# Patient Record
Sex: MALE | Race: WHITE | NOT HISPANIC OR LATINO | Employment: OTHER | URBAN - METROPOLITAN AREA
[De-identification: names, ages, dates, MRNs, and addresses within clinical notes are randomized per-mention and may not be internally consistent; named-entity substitution may affect disease eponyms.]

---

## 2017-01-04 ENCOUNTER — APPOINTMENT (OUTPATIENT)
Dept: LAB | Facility: CLINIC | Age: 67
End: 2017-01-04
Payer: COMMERCIAL

## 2017-01-04 ENCOUNTER — TRANSCRIBE ORDERS (OUTPATIENT)
Dept: LAB | Facility: CLINIC | Age: 67
End: 2017-01-04

## 2017-01-04 DIAGNOSIS — E55.9 VITAMIN D DEFICIENCY: ICD-10-CM

## 2017-01-04 DIAGNOSIS — I10 ESSENTIAL (PRIMARY) HYPERTENSION: ICD-10-CM

## 2017-01-04 DIAGNOSIS — M75.120 COMPLETE TEAR OF ROTATOR CUFF, UNSPECIFIED LATERALITY: Primary | ICD-10-CM

## 2017-01-04 LAB
25(OH)D3 SERPL-MCNC: 10.2 NG/ML (ref 30–100)
ALBUMIN SERPL BCP-MCNC: 3.9 G/DL (ref 3.5–5)
ALP SERPL-CCNC: 66 U/L (ref 46–116)
ALT SERPL W P-5'-P-CCNC: 35 U/L (ref 12–78)
ANION GAP SERPL CALCULATED.3IONS-SCNC: 5 MMOL/L (ref 4–13)
AST SERPL W P-5'-P-CCNC: 17 U/L (ref 5–45)
BASOPHILS # BLD AUTO: 0.06 THOUSANDS/ΜL (ref 0–0.1)
BASOPHILS NFR BLD AUTO: 1 % (ref 0–1)
BILIRUB SERPL-MCNC: 1.24 MG/DL (ref 0.2–1)
BUN SERPL-MCNC: 16 MG/DL (ref 5–25)
CALCIUM SERPL-MCNC: 9 MG/DL (ref 8.3–10.1)
CHLORIDE SERPL-SCNC: 102 MMOL/L (ref 100–108)
CHOLEST SERPL-MCNC: 171 MG/DL (ref 50–200)
CO2 SERPL-SCNC: 30 MMOL/L (ref 21–32)
CREAT SERPL-MCNC: 1.15 MG/DL (ref 0.6–1.3)
EOSINOPHIL # BLD AUTO: 0.25 THOUSAND/ΜL (ref 0–0.61)
EOSINOPHIL NFR BLD AUTO: 3 % (ref 0–6)
ERYTHROCYTE [DISTWIDTH] IN BLOOD BY AUTOMATED COUNT: 14.5 % (ref 11.6–15.1)
GFR SERPL CREATININE-BSD FRML MDRD: >60 ML/MIN/1.73SQ M
GLUCOSE SERPL-MCNC: 102 MG/DL (ref 65–140)
HCT VFR BLD AUTO: 45.9 % (ref 36.5–49.3)
HDLC SERPL-MCNC: 35 MG/DL (ref 40–60)
HGB BLD-MCNC: 15.8 G/DL (ref 12–17)
LDLC SERPL CALC-MCNC: 84 MG/DL (ref 0–100)
LYMPHOCYTES # BLD AUTO: 2.01 THOUSANDS/ΜL (ref 0.6–4.47)
LYMPHOCYTES NFR BLD AUTO: 26 % (ref 14–44)
MCH RBC QN AUTO: 30.2 PG (ref 26.8–34.3)
MCHC RBC AUTO-ENTMCNC: 34.4 G/DL (ref 31.4–37.4)
MCV RBC AUTO: 88 FL (ref 82–98)
MONOCYTES # BLD AUTO: 0.71 THOUSAND/ΜL (ref 0.17–1.22)
MONOCYTES NFR BLD AUTO: 9 % (ref 4–12)
NEUTROPHILS # BLD AUTO: 4.69 THOUSANDS/ΜL (ref 1.85–7.62)
NEUTS SEG NFR BLD AUTO: 61 % (ref 43–75)
NRBC BLD AUTO-RTO: 0 /100 WBCS
PLATELET # BLD AUTO: 239 THOUSANDS/UL (ref 149–390)
PMV BLD AUTO: 11.2 FL (ref 8.9–12.7)
POTASSIUM SERPL-SCNC: 4.2 MMOL/L (ref 3.5–5.3)
PROT SERPL-MCNC: 6.9 G/DL (ref 6.4–8.2)
RBC # BLD AUTO: 5.24 MILLION/UL (ref 3.88–5.62)
SODIUM SERPL-SCNC: 137 MMOL/L (ref 136–145)
TRIGL SERPL-MCNC: 262 MG/DL
WBC # BLD AUTO: 7.73 THOUSAND/UL (ref 4.31–10.16)

## 2017-01-04 PROCEDURE — 36415 COLL VENOUS BLD VENIPUNCTURE: CPT

## 2017-01-04 PROCEDURE — 85025 COMPLETE CBC W/AUTO DIFF WBC: CPT

## 2017-01-04 PROCEDURE — 80061 LIPID PANEL: CPT

## 2017-01-04 PROCEDURE — 82306 VITAMIN D 25 HYDROXY: CPT

## 2017-01-04 PROCEDURE — 80053 COMPREHEN METABOLIC PANEL: CPT

## 2017-01-12 ENCOUNTER — ALLSCRIPTS OFFICE VISIT (OUTPATIENT)
Dept: OTHER | Facility: OTHER | Age: 67
End: 2017-01-12

## 2017-01-17 ENCOUNTER — GENERIC CONVERSION - ENCOUNTER (OUTPATIENT)
Dept: OTHER | Facility: OTHER | Age: 67
End: 2017-01-17

## 2017-01-17 RX ORDER — ATENOLOL 50 MG/1
TABLET ORAL
Status: ON HOLD | COMMUNITY
Start: 2016-01-22 | End: 2017-01-25 | Stop reason: SDUPTHER

## 2017-01-17 RX ORDER — FLUTICASONE PROPIONATE 50 MCG
SPRAY, SUSPENSION (ML) NASAL
COMMUNITY
Start: 2015-08-06 | End: 2017-07-16

## 2017-01-17 RX ORDER — ACETAMINOPHEN 160 MG
TABLET,DISINTEGRATING ORAL
COMMUNITY
Start: 2016-06-06

## 2017-01-18 RX ORDER — LOSARTAN POTASSIUM 25 MG/1
25 TABLET ORAL DAILY
COMMUNITY
End: 2019-06-03 | Stop reason: CLARIF

## 2017-01-19 ENCOUNTER — ALLSCRIPTS OFFICE VISIT (OUTPATIENT)
Dept: OTHER | Facility: OTHER | Age: 67
End: 2017-01-19

## 2017-01-25 ENCOUNTER — ANESTHESIA EVENT (OUTPATIENT)
Dept: PERIOP | Facility: HOSPITAL | Age: 67
End: 2017-01-25
Payer: COMMERCIAL

## 2017-01-25 ENCOUNTER — HOSPITAL ENCOUNTER (OUTPATIENT)
Facility: HOSPITAL | Age: 67
Setting detail: OUTPATIENT SURGERY
Discharge: HOME | End: 2017-01-25
Attending: ORTHOPAEDIC SURGERY | Admitting: ORTHOPAEDIC SURGERY
Payer: COMMERCIAL

## 2017-01-25 ENCOUNTER — ANESTHESIA (OUTPATIENT)
Dept: PERIOP | Facility: HOSPITAL | Age: 67
End: 2017-01-25
Payer: COMMERCIAL

## 2017-01-25 VITALS
HEIGHT: 72 IN | OXYGEN SATURATION: 95 % | WEIGHT: 216.71 LBS | SYSTOLIC BLOOD PRESSURE: 153 MMHG | DIASTOLIC BLOOD PRESSURE: 70 MMHG | TEMPERATURE: 97.5 F | RESPIRATION RATE: 14 BRPM | HEART RATE: 70 BPM | BODY MASS INDEX: 29.35 KG/M2

## 2017-01-25 PROBLEM — M75.122 COMPLETE TEAR OF LEFT ROTATOR CUFF: Status: ACTIVE | Noted: 2017-01-25

## 2017-01-25 PROBLEM — M75.20 BICEPS TENDINITIS: Status: ACTIVE | Noted: 2017-01-25

## 2017-01-25 PROCEDURE — C1713 ANCHOR/SCREW BN/BN,TIS/BN: HCPCS | Performed by: ORTHOPAEDIC SURGERY

## 2017-01-25 PROCEDURE — A9270 NON-COVERED ITEM OR SERVICE: HCPCS

## 2017-01-25 DEVICE — ANCHOR SUT 4.75 X 19.1MM CLD EYELET SWIVLC VENTED: Type: IMPLANTABLE DEVICE | Site: SHOULDER | Status: FUNCTIONAL

## 2017-01-25 DEVICE — ANCHOR SUT 4.5 X 14MM FIBERWIRE 2  CRKSCR FLLY THRD: Type: IMPLANTABLE DEVICE | Site: SHOULDER | Status: FUNCTIONAL

## 2017-01-25 RX ORDER — SCOLOPAMINE TRANSDERMAL SYSTEM 1 MG/1
PATCH, EXTENDED RELEASE TRANSDERMAL
Status: COMPLETED
Start: 2017-01-25 | End: 2017-01-25

## 2017-01-25 RX ORDER — FENTANYL CITRATE/PF 50 MCG/ML
25 SYRINGE (ML) INJECTION
Status: DISCONTINUED | OUTPATIENT
Start: 2017-01-25 | End: 2017-01-25 | Stop reason: HOSPADM

## 2017-01-25 RX ORDER — PROPOFOL 10 MG/ML
INJECTION, EMULSION INTRAVENOUS AS NEEDED
Status: DISCONTINUED | OUTPATIENT
Start: 2017-01-25 | End: 2017-01-25 | Stop reason: SURG

## 2017-01-25 RX ORDER — OXYCODONE HYDROCHLORIDE 5 MG/1
5 TABLET ORAL EVERY 4 HOURS PRN
Status: DISCONTINUED | OUTPATIENT
Start: 2017-01-25 | End: 2017-01-25 | Stop reason: HOSPADM

## 2017-01-25 RX ORDER — SODIUM CHLORIDE, SODIUM LACTATE, POTASSIUM CHLORIDE, CALCIUM CHLORIDE 600; 310; 30; 20 MG/100ML; MG/100ML; MG/100ML; MG/100ML
100 INJECTION, SOLUTION INTRAVENOUS CONTINUOUS
Status: DISCONTINUED | OUTPATIENT
Start: 2017-01-25 | End: 2017-01-25 | Stop reason: HOSPADM

## 2017-01-25 RX ORDER — MIDAZOLAM HYDROCHLORIDE 1 MG/ML
INJECTION INTRAMUSCULAR; INTRAVENOUS
Status: COMPLETED
Start: 2017-01-25 | End: 2017-01-25

## 2017-01-25 RX ORDER — OXYCODONE HYDROCHLORIDE 5 MG/1
TABLET ORAL
Qty: 90 TABLET | Refills: 0 | Status: SHIPPED | OUTPATIENT
Start: 2017-01-25 | End: 2017-07-16

## 2017-01-25 RX ORDER — ALBUMIN, HUMAN INJ 5% 5 %
SOLUTION INTRAVENOUS CONTINUOUS PRN
Status: DISCONTINUED | OUTPATIENT
Start: 2017-01-25 | End: 2017-01-25 | Stop reason: SURG

## 2017-01-25 RX ORDER — FENTANYL CITRATE/PF 50 MCG/ML
SYRINGE (ML) INJECTION
Status: COMPLETED
Start: 2017-01-25 | End: 2017-01-25

## 2017-01-25 RX ORDER — LIDOCAINE HYDROCHLORIDE 10 MG/ML
INJECTION, SOLUTION INFILTRATION; PERINEURAL AS NEEDED
Status: DISCONTINUED | OUTPATIENT
Start: 2017-01-25 | End: 2017-01-25 | Stop reason: SURG

## 2017-01-25 RX ORDER — SODIUM CHLORIDE, SODIUM LACTATE, POTASSIUM CHLORIDE, CALCIUM CHLORIDE 600; 310; 30; 20 MG/100ML; MG/100ML; MG/100ML; MG/100ML
INJECTION, SOLUTION INTRAVENOUS CONTINUOUS PRN
Status: DISCONTINUED | OUTPATIENT
Start: 2017-01-25 | End: 2017-01-25 | Stop reason: SURG

## 2017-01-25 RX ORDER — ONDANSETRON 2 MG/ML
INJECTION INTRAMUSCULAR; INTRAVENOUS AS NEEDED
Status: DISCONTINUED | OUTPATIENT
Start: 2017-01-25 | End: 2017-01-25 | Stop reason: SURG

## 2017-01-25 RX ORDER — PROMETHAZINE HYDROCHLORIDE 25 MG/ML
12.5 INJECTION, SOLUTION INTRAMUSCULAR; INTRAVENOUS ONCE AS NEEDED
Status: COMPLETED | OUTPATIENT
Start: 2017-01-25 | End: 2017-01-25

## 2017-01-25 RX ORDER — LABETALOL HYDROCHLORIDE 5 MG/ML
10 INJECTION, SOLUTION INTRAVENOUS ONCE
Status: COMPLETED | OUTPATIENT
Start: 2017-01-25 | End: 2017-01-25

## 2017-01-25 RX ORDER — OXYCODONE HYDROCHLORIDE 5 MG/1
10 TABLET ORAL EVERY 4 HOURS PRN
Status: DISCONTINUED | OUTPATIENT
Start: 2017-01-25 | End: 2017-01-25 | Stop reason: HOSPADM

## 2017-01-25 RX ORDER — ONDANSETRON 2 MG/ML
4 INJECTION INTRAMUSCULAR; INTRAVENOUS ONCE AS NEEDED
Status: COMPLETED | OUTPATIENT
Start: 2017-01-25 | End: 2017-01-25

## 2017-01-25 RX ORDER — DOCUSATE SODIUM 100 MG/1
100 CAPSULE, LIQUID FILLED ORAL 2 TIMES DAILY
Qty: 60 CAPSULE | Refills: 0 | Status: SHIPPED | OUTPATIENT
Start: 2017-01-25 | End: 2020-07-28 | Stop reason: ALTCHOICE

## 2017-01-25 RX ORDER — SUCCINYLCHOLINE CHLORIDE 20 MG/ML
INJECTION INTRAMUSCULAR; INTRAVENOUS AS NEEDED
Status: DISCONTINUED | OUTPATIENT
Start: 2017-01-25 | End: 2017-01-25 | Stop reason: SURG

## 2017-01-25 RX ORDER — KETOROLAC TROMETHAMINE 30 MG/ML
INJECTION, SOLUTION INTRAMUSCULAR; INTRAVENOUS AS NEEDED
Status: DISCONTINUED | OUTPATIENT
Start: 2017-01-25 | End: 2017-01-25 | Stop reason: SURG

## 2017-01-25 RX ADMIN — LABETALOL HYDROCHLORIDE 10 MG: 5 INJECTION, SOLUTION INTRAVENOUS at 11:50

## 2017-01-25 RX ADMIN — CEFAZOLIN SODIUM 2000 MG: 2 SOLUTION INTRAVENOUS at 08:20

## 2017-01-25 RX ADMIN — KETOROLAC TROMETHAMINE 30 MG: 30 INJECTION, SOLUTION INTRAMUSCULAR at 10:15

## 2017-01-25 RX ADMIN — PHENYLEPHRINE HYDROCHLORIDE 30 MCG/MIN: 10 INJECTION, SOLUTION INTRAMUSCULAR; INTRAVENOUS; SUBCUTANEOUS at 08:42

## 2017-01-25 RX ADMIN — DEXAMETHASONE SODIUM PHOSPHATE 4 MG: 10 INJECTION INTRAMUSCULAR; INTRAVENOUS at 08:16

## 2017-01-25 RX ADMIN — LIDOCAINE HYDROCHLORIDE 50 MG: 10 INJECTION, SOLUTION INFILTRATION; PERINEURAL at 08:10

## 2017-01-25 RX ADMIN — ONDANSETRON 4 MG: 2 INJECTION INTRAMUSCULAR; INTRAVENOUS at 11:29

## 2017-01-25 RX ADMIN — MIDAZOLAM HYDROCHLORIDE 2 MG: 1 INJECTION, SOLUTION INTRAMUSCULAR; INTRAVENOUS at 07:36

## 2017-01-25 RX ADMIN — PROMETHAZINE HYDROCHLORIDE 12.5 MG: 25 INJECTION INTRAMUSCULAR; INTRAVENOUS at 11:45

## 2017-01-25 RX ADMIN — FENTANYL CITRATE 100 MCG: 50 INJECTION INTRAMUSCULAR; INTRAVENOUS at 07:36

## 2017-01-25 RX ADMIN — SCOPALAMINE 1 PATCH: 1 PATCH, EXTENDED RELEASE TRANSDERMAL at 07:34

## 2017-01-25 RX ADMIN — SUCCINYLCHOLINE CHLORIDE 200 MG: 20 INJECTION, SOLUTION INTRAMUSCULAR; INTRAVENOUS at 08:10

## 2017-01-25 RX ADMIN — PROPOFOL 180 MG: 10 INJECTION, EMULSION INTRAVENOUS at 08:10

## 2017-01-25 RX ADMIN — ALBUMIN HUMAN: 0.05 INJECTION, SOLUTION INTRAVENOUS at 08:32

## 2017-01-25 RX ADMIN — SODIUM CHLORIDE, SODIUM LACTATE, POTASSIUM CHLORIDE, AND CALCIUM CHLORIDE: .6; .31; .03; .02 INJECTION, SOLUTION INTRAVENOUS at 07:20

## 2017-01-25 RX ADMIN — ONDANSETRON 4 MG: 2 INJECTION INTRAMUSCULAR; INTRAVENOUS at 10:16

## 2017-02-01 ENCOUNTER — GENERIC CONVERSION - ENCOUNTER (OUTPATIENT)
Dept: OTHER | Facility: OTHER | Age: 67
End: 2017-02-01

## 2017-02-09 ENCOUNTER — ALLSCRIPTS OFFICE VISIT (OUTPATIENT)
Dept: OTHER | Facility: OTHER | Age: 67
End: 2017-02-09

## 2017-03-08 ENCOUNTER — GENERIC CONVERSION - ENCOUNTER (OUTPATIENT)
Dept: OTHER | Facility: OTHER | Age: 67
End: 2017-03-08

## 2017-03-08 ENCOUNTER — APPOINTMENT (OUTPATIENT)
Dept: PHYSICAL THERAPY | Facility: CLINIC | Age: 67
End: 2017-03-08
Payer: COMMERCIAL

## 2017-03-08 PROCEDURE — 97162 PT EVAL MOD COMPLEX 30 MIN: CPT

## 2017-03-09 ENCOUNTER — ALLSCRIPTS OFFICE VISIT (OUTPATIENT)
Dept: OTHER | Facility: OTHER | Age: 67
End: 2017-03-09

## 2017-03-10 ENCOUNTER — APPOINTMENT (OUTPATIENT)
Dept: PHYSICAL THERAPY | Facility: CLINIC | Age: 67
End: 2017-03-10
Payer: COMMERCIAL

## 2017-03-15 ENCOUNTER — APPOINTMENT (OUTPATIENT)
Dept: PHYSICAL THERAPY | Facility: CLINIC | Age: 67
End: 2017-03-15
Payer: COMMERCIAL

## 2017-03-15 PROCEDURE — 97140 MANUAL THERAPY 1/> REGIONS: CPT

## 2017-03-15 PROCEDURE — 97110 THERAPEUTIC EXERCISES: CPT

## 2017-03-15 PROCEDURE — 97010 HOT OR COLD PACKS THERAPY: CPT

## 2017-03-17 ENCOUNTER — APPOINTMENT (OUTPATIENT)
Dept: PHYSICAL THERAPY | Facility: CLINIC | Age: 67
End: 2017-03-17
Payer: COMMERCIAL

## 2017-03-17 PROCEDURE — 97110 THERAPEUTIC EXERCISES: CPT

## 2017-03-17 PROCEDURE — 97140 MANUAL THERAPY 1/> REGIONS: CPT

## 2017-03-21 ENCOUNTER — APPOINTMENT (OUTPATIENT)
Dept: PHYSICAL THERAPY | Facility: CLINIC | Age: 67
End: 2017-03-21
Payer: COMMERCIAL

## 2017-03-21 PROCEDURE — 97140 MANUAL THERAPY 1/> REGIONS: CPT

## 2017-03-21 PROCEDURE — 97110 THERAPEUTIC EXERCISES: CPT

## 2017-03-24 ENCOUNTER — APPOINTMENT (OUTPATIENT)
Dept: PHYSICAL THERAPY | Facility: CLINIC | Age: 67
End: 2017-03-24
Payer: COMMERCIAL

## 2017-03-24 PROCEDURE — 97140 MANUAL THERAPY 1/> REGIONS: CPT

## 2017-03-28 ENCOUNTER — GENERIC CONVERSION - ENCOUNTER (OUTPATIENT)
Dept: OTHER | Facility: OTHER | Age: 67
End: 2017-03-28

## 2017-03-28 ENCOUNTER — APPOINTMENT (OUTPATIENT)
Dept: PHYSICAL THERAPY | Facility: CLINIC | Age: 67
End: 2017-03-28
Payer: COMMERCIAL

## 2017-03-28 PROCEDURE — 97110 THERAPEUTIC EXERCISES: CPT

## 2017-03-28 PROCEDURE — 97140 MANUAL THERAPY 1/> REGIONS: CPT

## 2017-03-30 ENCOUNTER — ALLSCRIPTS OFFICE VISIT (OUTPATIENT)
Dept: OTHER | Facility: OTHER | Age: 67
End: 2017-03-30

## 2017-03-31 ENCOUNTER — APPOINTMENT (OUTPATIENT)
Dept: PHYSICAL THERAPY | Facility: CLINIC | Age: 67
End: 2017-03-31
Payer: COMMERCIAL

## 2017-03-31 PROCEDURE — 97140 MANUAL THERAPY 1/> REGIONS: CPT

## 2017-03-31 PROCEDURE — 97110 THERAPEUTIC EXERCISES: CPT

## 2017-04-04 ENCOUNTER — APPOINTMENT (OUTPATIENT)
Dept: PHYSICAL THERAPY | Facility: CLINIC | Age: 67
End: 2017-04-04
Payer: COMMERCIAL

## 2017-04-04 PROCEDURE — 97140 MANUAL THERAPY 1/> REGIONS: CPT

## 2017-04-04 PROCEDURE — 97110 THERAPEUTIC EXERCISES: CPT

## 2017-04-07 ENCOUNTER — APPOINTMENT (OUTPATIENT)
Dept: PHYSICAL THERAPY | Facility: CLINIC | Age: 67
End: 2017-04-07
Payer: COMMERCIAL

## 2017-04-07 PROCEDURE — 97140 MANUAL THERAPY 1/> REGIONS: CPT

## 2017-04-07 PROCEDURE — 97110 THERAPEUTIC EXERCISES: CPT

## 2017-04-11 ENCOUNTER — APPOINTMENT (OUTPATIENT)
Dept: PHYSICAL THERAPY | Facility: CLINIC | Age: 67
End: 2017-04-11
Payer: COMMERCIAL

## 2017-04-11 PROCEDURE — 97140 MANUAL THERAPY 1/> REGIONS: CPT

## 2017-04-11 PROCEDURE — 97110 THERAPEUTIC EXERCISES: CPT

## 2017-04-14 ENCOUNTER — APPOINTMENT (OUTPATIENT)
Dept: PHYSICAL THERAPY | Facility: CLINIC | Age: 67
End: 2017-04-14
Payer: COMMERCIAL

## 2017-04-14 PROCEDURE — 97110 THERAPEUTIC EXERCISES: CPT

## 2017-04-14 PROCEDURE — 97140 MANUAL THERAPY 1/> REGIONS: CPT

## 2017-04-18 ENCOUNTER — APPOINTMENT (OUTPATIENT)
Dept: PHYSICAL THERAPY | Facility: CLINIC | Age: 67
End: 2017-04-18
Payer: COMMERCIAL

## 2017-04-18 PROCEDURE — 97110 THERAPEUTIC EXERCISES: CPT

## 2017-04-18 PROCEDURE — 97140 MANUAL THERAPY 1/> REGIONS: CPT

## 2017-04-21 ENCOUNTER — APPOINTMENT (OUTPATIENT)
Dept: PHYSICAL THERAPY | Facility: CLINIC | Age: 67
End: 2017-04-21
Payer: COMMERCIAL

## 2017-04-21 PROCEDURE — 97140 MANUAL THERAPY 1/> REGIONS: CPT

## 2017-04-21 PROCEDURE — 97110 THERAPEUTIC EXERCISES: CPT

## 2017-04-25 ENCOUNTER — GENERIC CONVERSION - ENCOUNTER (OUTPATIENT)
Dept: OTHER | Facility: OTHER | Age: 67
End: 2017-04-25

## 2017-04-25 ENCOUNTER — APPOINTMENT (OUTPATIENT)
Dept: PHYSICAL THERAPY | Facility: CLINIC | Age: 67
End: 2017-04-25
Payer: COMMERCIAL

## 2017-04-25 PROCEDURE — 97110 THERAPEUTIC EXERCISES: CPT

## 2017-04-25 PROCEDURE — 97140 MANUAL THERAPY 1/> REGIONS: CPT

## 2017-04-27 ENCOUNTER — ALLSCRIPTS OFFICE VISIT (OUTPATIENT)
Dept: OTHER | Facility: OTHER | Age: 67
End: 2017-04-27

## 2017-04-27 DIAGNOSIS — M75.121 COMPLETE TEAR OF RIGHT ROTATOR CUFF: ICD-10-CM

## 2017-04-28 ENCOUNTER — APPOINTMENT (OUTPATIENT)
Dept: PHYSICAL THERAPY | Facility: CLINIC | Age: 67
End: 2017-04-28
Payer: COMMERCIAL

## 2017-04-28 ENCOUNTER — TRANSCRIBE ORDERS (OUTPATIENT)
Dept: ADMINISTRATIVE | Facility: HOSPITAL | Age: 67
End: 2017-04-28

## 2017-04-28 DIAGNOSIS — M75.121 COMPLETE TEAR OF RIGHT ROTATOR CUFF: Primary | ICD-10-CM

## 2017-04-28 PROCEDURE — 97110 THERAPEUTIC EXERCISES: CPT

## 2017-04-28 PROCEDURE — 97140 MANUAL THERAPY 1/> REGIONS: CPT

## 2017-05-02 ENCOUNTER — APPOINTMENT (OUTPATIENT)
Dept: PHYSICAL THERAPY | Facility: CLINIC | Age: 67
End: 2017-05-02
Payer: COMMERCIAL

## 2017-05-02 PROCEDURE — 97110 THERAPEUTIC EXERCISES: CPT

## 2017-05-02 PROCEDURE — 97140 MANUAL THERAPY 1/> REGIONS: CPT

## 2017-05-04 ENCOUNTER — HOSPITAL ENCOUNTER (OUTPATIENT)
Dept: MRI IMAGING | Facility: HOSPITAL | Age: 67
Discharge: HOME | End: 2017-05-04
Attending: ORTHOPAEDIC SURGERY
Payer: COMMERCIAL

## 2017-05-04 ENCOUNTER — APPOINTMENT (OUTPATIENT)
Dept: PHYSICAL THERAPY | Facility: CLINIC | Age: 67
End: 2017-05-04
Payer: COMMERCIAL

## 2017-05-04 DIAGNOSIS — M75.121 COMPLETE TEAR OF RIGHT ROTATOR CUFF: ICD-10-CM

## 2017-05-04 PROCEDURE — 73221 MRI JOINT UPR EXTREM W/O DYE: CPT

## 2017-05-04 PROCEDURE — 97110 THERAPEUTIC EXERCISES: CPT

## 2017-05-04 PROCEDURE — 97140 MANUAL THERAPY 1/> REGIONS: CPT

## 2017-05-09 ENCOUNTER — APPOINTMENT (OUTPATIENT)
Dept: PHYSICAL THERAPY | Facility: CLINIC | Age: 67
End: 2017-05-09
Payer: COMMERCIAL

## 2017-05-09 PROCEDURE — 97110 THERAPEUTIC EXERCISES: CPT

## 2017-05-09 PROCEDURE — 97140 MANUAL THERAPY 1/> REGIONS: CPT

## 2017-05-11 ENCOUNTER — APPOINTMENT (OUTPATIENT)
Dept: PHYSICAL THERAPY | Facility: CLINIC | Age: 67
End: 2017-05-11
Payer: COMMERCIAL

## 2017-05-11 ENCOUNTER — ALLSCRIPTS OFFICE VISIT (OUTPATIENT)
Dept: OTHER | Facility: OTHER | Age: 67
End: 2017-05-11

## 2017-05-16 ENCOUNTER — APPOINTMENT (OUTPATIENT)
Dept: PHYSICAL THERAPY | Facility: CLINIC | Age: 67
End: 2017-05-16
Payer: COMMERCIAL

## 2017-05-18 ENCOUNTER — APPOINTMENT (OUTPATIENT)
Dept: PHYSICAL THERAPY | Facility: CLINIC | Age: 67
End: 2017-05-18
Payer: COMMERCIAL

## 2017-05-22 ENCOUNTER — ALLSCRIPTS OFFICE VISIT (OUTPATIENT)
Dept: OTHER | Facility: OTHER | Age: 67
End: 2017-05-22

## 2017-05-23 ENCOUNTER — APPOINTMENT (OUTPATIENT)
Dept: PHYSICAL THERAPY | Facility: CLINIC | Age: 67
End: 2017-05-23
Payer: COMMERCIAL

## 2017-05-25 ENCOUNTER — APPOINTMENT (OUTPATIENT)
Dept: PHYSICAL THERAPY | Facility: CLINIC | Age: 67
End: 2017-05-25
Payer: COMMERCIAL

## 2017-05-30 ENCOUNTER — APPOINTMENT (OUTPATIENT)
Dept: PHYSICAL THERAPY | Facility: CLINIC | Age: 67
End: 2017-05-30
Payer: COMMERCIAL

## 2017-06-13 ENCOUNTER — ALLSCRIPTS OFFICE VISIT (OUTPATIENT)
Dept: OTHER | Facility: OTHER | Age: 67
End: 2017-06-13

## 2017-06-13 DIAGNOSIS — E78.5 HYPERLIPIDEMIA: ICD-10-CM

## 2017-07-16 ENCOUNTER — APPOINTMENT (EMERGENCY)
Dept: CT IMAGING | Facility: HOSPITAL | Age: 67
End: 2017-07-16
Payer: COMMERCIAL

## 2017-07-16 ENCOUNTER — APPOINTMENT (EMERGENCY)
Dept: RADIOLOGY | Facility: HOSPITAL | Age: 67
End: 2017-07-16
Payer: COMMERCIAL

## 2017-07-16 ENCOUNTER — HOSPITAL ENCOUNTER (EMERGENCY)
Facility: HOSPITAL | Age: 67
Discharge: HOME | End: 2017-07-16
Attending: EMERGENCY MEDICINE | Admitting: EMERGENCY MEDICINE
Payer: COMMERCIAL

## 2017-07-16 VITALS
RESPIRATION RATE: 16 BRPM | HEART RATE: 52 BPM | WEIGHT: 225 LBS | DIASTOLIC BLOOD PRESSURE: 64 MMHG | OXYGEN SATURATION: 97 % | BODY MASS INDEX: 30.52 KG/M2 | SYSTOLIC BLOOD PRESSURE: 107 MMHG | TEMPERATURE: 97.8 F

## 2017-07-16 DIAGNOSIS — S22.089A CLOSED T12 FRACTURE (HCC): ICD-10-CM
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
## 2017-07-16 LAB
ALBUMIN SERPL BCP-MCNC: 3.6 G/DL (ref 3.5–5)
ALP SERPL-CCNC: 72 U/L (ref 46–116)
ALT SERPL W P-5'-P-CCNC: 32 U/L (ref 12–78)
ANION GAP SERPL CALCULATED.3IONS-SCNC: 6 MMOL/L (ref 4–13)
AST SERPL W P-5'-P-CCNC: 28 U/L (ref 5–45)
BASOPHILS # BLD AUTO: 0.08 THOUSANDS/ΜL (ref 0–0.1)
BASOPHILS NFR BLD AUTO: 1 % (ref 0–1)
BILIRUB SERPL-MCNC: 1.4 MG/DL (ref 0.2–1)
BUN SERPL-MCNC: 18 MG/DL (ref 5–25)
CALCIUM SERPL-MCNC: 8.5 MG/DL (ref 8.3–10.1)
CHLORIDE SERPL-SCNC: 104 MMOL/L (ref 100–108)
CO2 SERPL-SCNC: 28 MMOL/L (ref 21–32)
CREAT SERPL-MCNC: 1.13 MG/DL (ref 0.6–1.3)
EOSINOPHIL # BLD AUTO: 0.15 THOUSAND/ΜL (ref 0–0.61)
EOSINOPHIL NFR BLD AUTO: 2 % (ref 0–6)
ERYTHROCYTE [DISTWIDTH] IN BLOOD BY AUTOMATED COUNT: 14.2 % (ref 11.6–15.1)
GFR SERPL CREATININE-BSD FRML MDRD: >60 ML/MIN/1.73SQ M
GLUCOSE SERPL-MCNC: 119 MG/DL (ref 65–140)
HCT VFR BLD AUTO: 44.8 % (ref 36.5–49.3)
HGB BLD-MCNC: 15.2 G/DL (ref 12–17)
LYMPHOCYTES # BLD AUTO: 1.59 THOUSANDS/ΜL (ref 0.6–4.47)
LYMPHOCYTES NFR BLD AUTO: 21 % (ref 14–44)
MCH RBC QN AUTO: 29.2 PG (ref 26.8–34.3)
MCHC RBC AUTO-ENTMCNC: 33.9 G/DL (ref 31.4–37.4)
MCV RBC AUTO: 86 FL (ref 82–98)
MONOCYTES # BLD AUTO: 0.57 THOUSAND/ΜL (ref 0.17–1.22)
MONOCYTES NFR BLD AUTO: 8 % (ref 4–12)
NEUTROPHILS # BLD AUTO: 5.15 THOUSANDS/ΜL (ref 1.85–7.62)
NEUTS SEG NFR BLD AUTO: 68 % (ref 43–75)
NRBC BLD AUTO-RTO: 0 /100 WBCS
PLATELET # BLD AUTO: 249 THOUSANDS/UL (ref 149–390)
PMV BLD AUTO: 11 FL (ref 8.9–12.7)
POTASSIUM SERPL-SCNC: 4.3 MMOL/L (ref 3.5–5.3)
PROT SERPL-MCNC: 6.6 G/DL (ref 6.4–8.2)
RBC # BLD AUTO: 5.2 MILLION/UL (ref 3.88–5.62)
SODIUM SERPL-SCNC: 138 MMOL/L (ref 136–145)
TROPONIN I SERPL-MCNC: <0.02 NG/ML
TROPONIN I SERPL-MCNC: <0.02 NG/ML
WBC # BLD AUTO: 7.6 THOUSAND/UL (ref 4.31–10.16)

## 2017-07-16 PROCEDURE — 93005 ELECTROCARDIOGRAM TRACING: CPT | Performed by: EMERGENCY MEDICINE

## 2017-07-16 PROCEDURE — 85025 COMPLETE CBC W/AUTO DIFF WBC: CPT | Performed by: EMERGENCY MEDICINE

## 2017-07-16 PROCEDURE — 71020 HB CHEST X-RAY 2VW FRONTAL&LATL: CPT

## 2017-07-16 PROCEDURE — 99284 EMERGENCY DEPT VISIT MOD MDM: CPT

## 2017-07-16 PROCEDURE — 36415 COLL VENOUS BLD VENIPUNCTURE: CPT | Performed by: EMERGENCY MEDICINE

## 2017-07-16 PROCEDURE — 93005 ELECTROCARDIOGRAM TRACING: CPT

## 2017-07-16 PROCEDURE — 70450 CT HEAD/BRAIN W/O DYE: CPT

## 2017-07-16 PROCEDURE — 71275 CT ANGIOGRAPHY CHEST: CPT

## 2017-07-16 PROCEDURE — 80053 COMPREHEN METABOLIC PANEL: CPT | Performed by: EMERGENCY MEDICINE

## 2017-07-16 PROCEDURE — 84484 ASSAY OF TROPONIN QUANT: CPT | Performed by: EMERGENCY MEDICINE

## 2017-07-16 RX ORDER — OXYCODONE HYDROCHLORIDE AND ACETAMINOPHEN 5; 325 MG/1; MG/1
1 TABLET ORAL EVERY 6 HOURS PRN
Qty: 12 TABLET | Refills: 0 | Status: SHIPPED | OUTPATIENT
Start: 2017-07-16 | End: 2017-07-26

## 2017-07-16 RX ADMIN — IOHEXOL 60 ML: 350 INJECTION, SOLUTION INTRAVENOUS at 12:47

## 2017-07-17 ENCOUNTER — ALLSCRIPTS OFFICE VISIT (OUTPATIENT)
Dept: OTHER | Facility: OTHER | Age: 67
End: 2017-07-17

## 2017-07-17 DIAGNOSIS — M54.50 LOW BACK PAIN: ICD-10-CM

## 2017-07-18 LAB
ATRIAL RATE: 48 BPM
ATRIAL RATE: 50 BPM
ATRIAL RATE: 60 BPM
P AXIS: 25 DEGREES
P AXIS: 27 DEGREES
PR INTERVAL: 206 MS
PR INTERVAL: 208 MS
PR INTERVAL: 216 MS
QRS AXIS: 131 DEGREES
QRS AXIS: 41 DEGREES
QRS AXIS: 44 DEGREES
QRSD INTERVAL: 80 MS
QRSD INTERVAL: 84 MS
QRSD INTERVAL: 86 MS
QT INTERVAL: 398 MS
QT INTERVAL: 404 MS
QT INTERVAL: 422 MS
QTC INTERVAL: 360 MS
QTC INTERVAL: 384 MS
QTC INTERVAL: 398 MS
T WAVE AXIS: 134 DEGREES
T WAVE AXIS: 47 DEGREES
T WAVE AXIS: 50 DEGREES
VENTRICULAR RATE: 48 BPM
VENTRICULAR RATE: 50 BPM
VENTRICULAR RATE: 60 BPM

## 2017-07-20 ENCOUNTER — ALLSCRIPTS OFFICE VISIT (OUTPATIENT)
Dept: OTHER | Facility: OTHER | Age: 67
End: 2017-07-20

## 2017-07-21 ENCOUNTER — GENERIC CONVERSION - ENCOUNTER (OUTPATIENT)
Dept: OTHER | Facility: OTHER | Age: 67
End: 2017-07-21

## 2017-08-07 ENCOUNTER — GENERIC CONVERSION - ENCOUNTER (OUTPATIENT)
Dept: OTHER | Facility: OTHER | Age: 67
End: 2017-08-07

## 2017-08-07 ENCOUNTER — APPOINTMENT (OUTPATIENT)
Dept: PHYSICAL THERAPY | Facility: CLINIC | Age: 67
End: 2017-08-07
Payer: COMMERCIAL

## 2017-08-07 DIAGNOSIS — M54.50 LOW BACK PAIN: ICD-10-CM

## 2017-08-07 PROCEDURE — G8991 OTHER PT/OT GOAL STATUS: HCPCS | Performed by: PHYSICAL THERAPIST

## 2017-08-07 PROCEDURE — 97162 PT EVAL MOD COMPLEX 30 MIN: CPT

## 2017-08-07 PROCEDURE — G8990 OTHER PT/OT CURRENT STATUS: HCPCS | Performed by: PHYSICAL THERAPIST

## 2017-08-10 ENCOUNTER — APPOINTMENT (OUTPATIENT)
Dept: PHYSICAL THERAPY | Facility: CLINIC | Age: 67
End: 2017-08-10
Payer: COMMERCIAL

## 2017-08-10 PROCEDURE — 97110 THERAPEUTIC EXERCISES: CPT

## 2017-08-10 PROCEDURE — 97140 MANUAL THERAPY 1/> REGIONS: CPT

## 2017-08-14 ENCOUNTER — APPOINTMENT (OUTPATIENT)
Dept: PHYSICAL THERAPY | Facility: CLINIC | Age: 67
End: 2017-08-14
Payer: COMMERCIAL

## 2017-08-14 PROCEDURE — 97112 NEUROMUSCULAR REEDUCATION: CPT

## 2017-08-14 PROCEDURE — 97110 THERAPEUTIC EXERCISES: CPT

## 2017-08-17 ENCOUNTER — APPOINTMENT (OUTPATIENT)
Dept: PHYSICAL THERAPY | Facility: CLINIC | Age: 67
End: 2017-08-17
Payer: COMMERCIAL

## 2017-08-17 PROCEDURE — 97110 THERAPEUTIC EXERCISES: CPT

## 2017-08-17 PROCEDURE — 97140 MANUAL THERAPY 1/> REGIONS: CPT

## 2017-08-21 ENCOUNTER — APPOINTMENT (OUTPATIENT)
Dept: PHYSICAL THERAPY | Facility: CLINIC | Age: 67
End: 2017-08-21
Payer: COMMERCIAL

## 2017-08-21 PROCEDURE — 97110 THERAPEUTIC EXERCISES: CPT

## 2017-08-24 ENCOUNTER — APPOINTMENT (OUTPATIENT)
Dept: PHYSICAL THERAPY | Facility: CLINIC | Age: 67
End: 2017-08-24
Payer: COMMERCIAL

## 2017-08-24 PROCEDURE — 97110 THERAPEUTIC EXERCISES: CPT

## 2017-08-28 ENCOUNTER — APPOINTMENT (OUTPATIENT)
Dept: PHYSICAL THERAPY | Facility: CLINIC | Age: 67
End: 2017-08-28
Payer: COMMERCIAL

## 2017-08-28 PROCEDURE — 97110 THERAPEUTIC EXERCISES: CPT

## 2017-08-31 ENCOUNTER — APPOINTMENT (OUTPATIENT)
Dept: PHYSICAL THERAPY | Facility: CLINIC | Age: 67
End: 2017-08-31
Payer: COMMERCIAL

## 2017-08-31 PROCEDURE — 97110 THERAPEUTIC EXERCISES: CPT

## 2017-09-05 ENCOUNTER — APPOINTMENT (OUTPATIENT)
Dept: PHYSICAL THERAPY | Facility: CLINIC | Age: 67
End: 2017-09-05
Payer: COMMERCIAL

## 2017-09-07 ENCOUNTER — APPOINTMENT (OUTPATIENT)
Dept: PHYSICAL THERAPY | Facility: CLINIC | Age: 67
End: 2017-09-07
Payer: COMMERCIAL

## 2017-09-07 ENCOUNTER — GENERIC CONVERSION - ENCOUNTER (OUTPATIENT)
Dept: OTHER | Facility: OTHER | Age: 67
End: 2017-09-07

## 2017-09-07 PROCEDURE — 97110 THERAPEUTIC EXERCISES: CPT

## 2017-09-11 ENCOUNTER — APPOINTMENT (OUTPATIENT)
Dept: PHYSICAL THERAPY | Facility: CLINIC | Age: 67
End: 2017-09-11
Payer: COMMERCIAL

## 2017-09-11 PROCEDURE — 97110 THERAPEUTIC EXERCISES: CPT

## 2017-09-14 ENCOUNTER — APPOINTMENT (OUTPATIENT)
Dept: PHYSICAL THERAPY | Facility: CLINIC | Age: 67
End: 2017-09-14
Payer: COMMERCIAL

## 2017-09-18 ENCOUNTER — APPOINTMENT (OUTPATIENT)
Dept: PHYSICAL THERAPY | Facility: CLINIC | Age: 67
End: 2017-09-18
Payer: COMMERCIAL

## 2017-09-18 PROCEDURE — 97110 THERAPEUTIC EXERCISES: CPT

## 2017-09-18 PROCEDURE — G8992 OTHER PT/OT  D/C STATUS: HCPCS

## 2017-09-18 PROCEDURE — G8991 OTHER PT/OT GOAL STATUS: HCPCS

## 2017-09-21 ENCOUNTER — APPOINTMENT (OUTPATIENT)
Dept: PHYSICAL THERAPY | Facility: CLINIC | Age: 67
End: 2017-09-21
Payer: COMMERCIAL

## 2017-09-25 ENCOUNTER — APPOINTMENT (OUTPATIENT)
Dept: PHYSICAL THERAPY | Facility: CLINIC | Age: 67
End: 2017-09-25
Payer: COMMERCIAL

## 2017-09-28 ENCOUNTER — APPOINTMENT (OUTPATIENT)
Dept: PHYSICAL THERAPY | Facility: CLINIC | Age: 67
End: 2017-09-28
Payer: COMMERCIAL

## 2017-12-08 ENCOUNTER — TRANSCRIBE ORDERS (OUTPATIENT)
Dept: LAB | Facility: CLINIC | Age: 67
End: 2017-12-08

## 2017-12-08 ENCOUNTER — APPOINTMENT (OUTPATIENT)
Dept: LAB | Facility: CLINIC | Age: 67
End: 2017-12-08
Payer: COMMERCIAL

## 2017-12-08 PROCEDURE — 85025 COMPLETE CBC W/AUTO DIFF WBC: CPT

## 2017-12-08 PROCEDURE — 80061 LIPID PANEL: CPT

## 2017-12-08 PROCEDURE — 80053 COMPREHEN METABOLIC PANEL: CPT

## 2017-12-08 PROCEDURE — 36415 COLL VENOUS BLD VENIPUNCTURE: CPT

## 2017-12-12 ENCOUNTER — ALLSCRIPTS OFFICE VISIT (OUTPATIENT)
Dept: OTHER | Facility: OTHER | Age: 67
End: 2017-12-12

## 2017-12-12 DIAGNOSIS — Z12.5 ENCOUNTER FOR SCREENING FOR MALIGNANT NEOPLASM OF PROSTATE: ICD-10-CM

## 2017-12-12 DIAGNOSIS — I10 ESSENTIAL (PRIMARY) HYPERTENSION: ICD-10-CM

## 2017-12-12 DIAGNOSIS — E55.9 VITAMIN D DEFICIENCY: ICD-10-CM

## 2017-12-12 DIAGNOSIS — R73.01 IMPAIRED FASTING GLUCOSE: ICD-10-CM

## 2017-12-13 NOTE — PROGRESS NOTES
Assessment    1  Benign hypertension (401 1) (I10)   2  Hyperlipidemia (272 4) (E78 5)   3  Vitamin D deficiency (268 9) (E55 9)   4  Depression (311) (F32 9)   5  Impaired fasting glucose (790 21) (R73 01)    Plan  Benign hypertension    · Losartan Potassium-HCTZ 50-12 5 MG Oral Tablet; Take 1 tablet daily   · (1) CBC/PLT/DIFF; Status:Active; Requested for:68Gcj3297;    · (1) COMPREHENSIVE METABOLIC PANEL; Status:Active; Requested for:89Kjl9063;    · (1) LIPID PANEL, FASTING; Status:Active; Requested for:44Pyd1820;    · Follow-up visit in 4 Months Evaluation and Treatment  Follow-up  Status: Hold For - Scheduling Requested for: 41Czk4543  Impaired fasting glucose    · (1) HEMOGLOBIN A1C; Status:Active; Requested for:12Dec2017;   Screening for prostate cancer    · (1) PSA (SCREEN) (Dx V76 44 Screen for Prostate Cancer); Status:Active; Requestedfor:56Cvo9008;   Vitamin D deficiency    · (1) VITAMIN D 25-HYDROXY; Status:Active; Requested for:42Gtd9525;     Discussion/Summary  Discussion Summary:   Chronic problems are stable  Because of the sugar, reinforced diet and exercise  Will check an A1c with next blood work  Suggested he resume his vitamin D supplementation because he was getting some increased joint pains  We'll recheck that level at next visit as well  Counseling Documentation With Imm: The patient was counseled regarding instructions for management,-- impressions  Medication SE Review and Pt Understands Tx: Possible side effects of new medications were reviewed with the patient/guardian today  The treatment plan was reviewed with the patient/guardian  The patient/guardian understands and agrees with the treatment plan   Self Referrals:   Self Referrals: No      Chief Complaint  Chief Complaint Free Text Note Form: Patient is here for a routine follow up and lab review  History of Present Illness  HPI: Patient comes in today for routine follow-up  He states he is doing well for the most part   His blood pressure is controlled  Cholesterol is controlled  He admits he stopped taking his vitamin D supplementation, even though his level was fairly low the last time  His depression is under control but he wants to stay on the medicine  This time, his fasting sugar was a little high  He admits he does have a sweet tooth  Review of Systems  Complete-Male:  Cardiovascular: no chest pain  Respiratory: no shortness of breath  Gastrointestinal: no abdominal pain  Active Problems  1  Accidental fall, initial encounter (E888 9) (W19 XXXA)   2  Aftercare following surgery of the musculoskeletal system (V58 78) (Z47 89)   3  Allergic rhinitis (477 9) (J30 9)   4  Benign hypertension (401 1) (I10)   5  Bicipital tendonitis of shoulder, right (726 12) (M75 21)   6  Bilateral edema of lower extremity (782 3) (R60 0)   7  Complete tear of right rotator cuff (727 61) (M75 121)   8  Compression fracture of twelfth thoracic vertebra, closed, initial encounter   9  Depression (311) (F32 9)   10  Hemorrhoids (455 6) (K64 9)   11  Hyperlipidemia (272 4) (E78 5)   12  Loose body in shoulder joint, right (718 11) (M24 011)   13  Low back pain (724 2) (M54 5)   14  Motion sickness (994 6) (T75 3XXA)   15  Need for influenza vaccination (V04 81) (Z23)   16  Preop exam for internal medicine (V72 83) (Z01 818)   17  Screening for colon cancer (V76 51) (Z12 11)   18  Screening for prostate cancer (V76 44) (Z12 5)   19  Sea sickness (994 6) (T75 3XXA)   20  Visit for pre-operative examination (V72 84) (Z01 818)   21  Vitamin D deficiency (268 9) (E55 9)    Past Medical History  1  Acute URI (465 9) (J06 9)  Active Problems And Past Medical History Reviewed: The active problems and past medical history were reviewed and updated today  Surgical History  1  History of Abdominal Surgery   2  History of Knee Arthroscopy (Therapeutic)   3  History of Nasal Septal Deviation Repair   4   History of Tonsillectomy    Family History  Mother    1  Family history of    2  Family history of Migraines  Father    3  Family history of Brain cancer   4  Family history of    5  Family history of Heart disease   6  Family history of Hyperlipidemia   7  Family history of Hypertension  Family History    8  Denied: Family history of mental disorder   9  Denied: Family history of substance abuse    Social History     · Former smoker (V15 82) (Q07 746)   · Denied: History of High risk sexual behavior   · Denied: History of Illicit drug use   ·    · Part-time employment   · Rarely consumes alcohol (V49 89) (Z78 9)    Current Meds   1  Atenolol 50 MG Oral Tablet; Take 1 tablet daily; Therapy: 61UHD2484 to (Last Starr Grav)  Requested for: 27Ndt6009 Ordered   2  FLUoxetine HCl - 10 MG Oral Capsule; take 1 capsule daily; Therapy: 70EMQ2826 to (Last QK:08QDO7912)  Requested for: 35NBX8072 Ordered   3  Hydrocortisone 2 5 % Rectal Cream; INSERT 1 APPLICATORFUL RECTALLY 3 TIMES DAILY; Therapy: 17KJJ7617 to (Last Rx:2016)  Requested for: 63IWQ9588 Ordered   4  Losartan Potassium-HCTZ 50-12 5 MG Oral Tablet; Take 1 tablet daily; Therapy: 94Hnt2693 to (Evaluate:22Jkv2148)  Requested for: 2017; Last Rx:2017 Ordered   5  Simvastatin 20 MG Oral Tablet; Take 1 tablet daily; Therapy: 96PUL4671 to (Last Rx:2017)  Requested for: 79Wya5046 Ordered   6  Vitamin D3 2000 UNIT Oral Capsule; take 1 capsule daily; Therapy: 95QXX2212 to (Evaluate:96Kzo5799); Last Rx:66Qju6072 Ordered  Medication List Reviewed: The medication list was reviewed and updated today  Allergies  1  amlodipine   2   Lisinopril TABS    Vitals  Vital Signs    Recorded: 2017 02:29PM   Temperature 97 1 F   Heart Rate 65   Systolic 809 mm Hg   Diastolic 72 mm Hg   Height 6 ft 1 in   Weight 220 lb 8 oz   BMI Calculated 29 09 kg/m2   BSA Calculated 2 24 m2   O2 Saturation 96       Physical Exam   Constitutional  General appearance: No acute distress, well appearing and well nourished  Pulmonary  Respiratory effort: No increased work of breathing or signs of respiratory distress  Auscultation of lungs: Clear to auscultation, equal breath sounds bilaterally, no wheezes, no rales, no rhonci  Cardiovascular  Auscultation of heart: Normal rate and rhythm, normal S1 and S2, without murmurs  Examination of extremities for edema and/or varicosities: Normal    Abdomen  Abdomen: Non-tender, no masses  Liver and spleen: No hepatomegaly or splenomegaly  Psychiatric  Orientation to person, place and time: Normal    Mood and affect: Normal          Results/Data  (1) CBC/PLT/DIFF 66VJU2343 07:44AM Gale Reasons Order Number: [id number]     Test Name Result Flag Reference   WBC COUNT 6 39 Thousand/uL  4 31-10 16   RBC COUNT 5 14 Million/uL  3 88-5 62   HEMOGLOBIN 15 4 g/dL  12 0-17 0   HEMATOCRIT 45 0 %  36 5-49 3   MCV 88 fL  82-98   MCH 30 0 pg  26 8-34 3   MCHC 34 2 g/dL  31 4-37 4   RDW 14 3 %  11 6-15 1   MPV 11 3 fL  8 9-12 7   PLATELET COUNT 335 Thousands/uL  149-390   nRBC AUTOMATED 0 /100 WBCs     NEUTROPHILS RELATIVE PERCENT 66 %  43-75   LYMPHOCYTES RELATIVE PERCENT 23 %  14-44   MONOCYTES RELATIVE PERCENT 8 %  4-12   EOSINOPHILS RELATIVE PERCENT 2 %  0-6   BASOPHILS RELATIVE PERCENT 1 %  0-1   NEUTROPHILS ABSOLUTE COUNT 4 19 Thousands/? ??L  1 85-7 62   LYMPHOCYTES ABSOLUTE COUNT 1 44 Thousands/? ??L  0 60-4 47   MONOCYTES ABSOLUTE COUNT 0 53 Thousand/? ??L  0 17-1 22   EOSINOPHILS ABSOLUTE COUNT 0 14 Thousand/? ??L  0 00-0 61   BASOPHILS ABSOLUTE COUNT 0 07 Thousands/? ??L  0 00-0 10     (1) COMPREHENSIVE METABOLIC PANEL 65QNP4436 58:60PX Gale Reasons Order Number: [id number]     Test Name Result Flag Reference   SODIUM 139 mmol/L  136-145   POTASSIUM 4 3 mmol/L  3 5-5 3   CHLORIDE 105 mmol/L  100-108   CARBON DIOXIDE 29 mmol/L  21-32   ANION GAP (CALC) 5 mmol/L  4-13   BLOOD UREA NITROGEN 22 mg/dL  5-25   CREATININE 1 21 mg/dL 0  60-1 30   Standardized to IDMS reference method   CALCIUM 9 0 mg/dL  8 3-10 1   BILI, TOTAL 0 94 mg/dL  0 20-1 00   ALK PHOSPHATAS 60 U/L     ALT (SGPT) 28 U/L  12-78   Specimen collection should occur prior to Sulfasalazine and/or Sulfapyridine administration due to the potential for falsely depressed results  AST(SGOT) 19 U/L  5-45   Specimen collection should occur prior to Sulfasalazine administration due to the potential for falsely depressed results  ALBUMIN 3 7 g/dL  3 5-5 0   TOTAL PROTEIN 7 2 g/dL  6 4-8 2   eGFR 62 ml/min/1 73sq m       National Kidney Disease Education Program recommendations are as follows: GFR calculation is accurate only with a steady state creatinine Chronic Kidney disease less than 60 ml/min/1 73 sq  meters Kidney failure less than 15 ml/min/1 73 sq  meters  GLUCOSE FASTING 119 mg/dL H 65-99   Specimen collection should occur prior to Sulfasalazine administration due to the potential for falsely depressed results  Specimen collection should occur prior to Sulfapyridine administration due to the potential for falsely elevated results  (1) LIPID PANEL, FASTING 09Rfi5569 07:44AM Quinton Hurd Order Number: [id number]     Test Name Result Flag Reference   CHOLESTEROL 145 mg/dL     HDL,DIRECT 38 mg/dL L 40-60   Specimen collection should occur prior to Metamizole administration due to the potential for falsley depressed results  LDL CHOLESTEROL CALCULATED 79 mg/dL  0-100     Triglyceride:       Normal <150 mg/dl  Borderline High 150-199 mg/dl  High 200-499 mg/dl  Very High >499 mg/dl   Cholesterol:      Desirable <200 mg/dl   Borderline High 200-239 mg/dl   High >239 mg/dl   HDL Cholesterol:      High>59 mg/dL   Low <41 mg/dL   This screening LDL is a calculated result  It does not have the accuracy of the Direct Measured LDL in the monitoring of patients with hyperlipidemia and/or statin therapy    Direct Measure LDL (VAS387) must be ordered separately in these patients  TRIGLYCERIDES 141 mg/dL  <=150   Specimen collection should occur prior to N-Acetylcysteine or Metamizole administration due to the potential for falsely depressed results  Health Management  Screening for colon cancer   COLONOSCOPY; every 10 years; Last 85Fwz6344; Next Due: 04RFJ7968; Active  Screening for prostate cancer   (1) PSA (SCREEN) (Dx V76 44 Screen for Prostate Cancer); every 1 year; Last 26ARV4647; NextDue: 52IJF0172;  Overdue    Signatures   Electronically signed by : Andrew Gonzales MD; Dec 12 2017  2:51PM EST                       (Author)

## 2018-01-11 NOTE — PROGRESS NOTES
Assessment    1  Benign hypertension (401 1) (I10)   2  Bilateral edema of lower extremity (782 3) (R60 0)    Plan  Benign hypertension    · AmLODIPine Besylate 5 MG Oral Tablet   · Atenolol 25 MG Oral Tablet; TAKE 1 TABLET DAILY   · Atenolol 25 MG Oral Tablet; TAKE 1 TABLET DAILY   · Follow-up visit in 2 weeks Evaluation and Treatment  Follow-up  Status: Complete  Done:  21AYF8778    Discussion/Summary    Edema is from the amlodipine and it's not controlling his pressure  Will switch meds  The patient was counseled regarding instructions for management, impressions  The treatment plan was reviewed with the patient/guardian  The patient/guardian understands and agrees with the treatment plan      Chief Complaint  swollen ankle      History of Present Illness  HPI: Patient comes in today complaining of bilateral ankle swelling  He admits this started shortly after the amlodipine was started  His cough has pretty much gone away off the ACE inhibitor  Active Problems    1  Allergic rhinitis (477 9) (J30 9)   2  Benign hypertension (401 1) (I10)   3  Depression (311) (F32 9)   4  Hyperlipidemia (272 4) (E78 5)   5  Need for influenza vaccination (V04 81) (Z23)   6  Screening for colon cancer (V76 51) (Z12 11)   7  Screening for prostate cancer (V76 44) (Z12 5)   8  Sea sickness (994 6) (T75 3XXA)    Past Medical History  Active Problems And Past Medical History Reviewed: The active problems and past medical history were reviewed and updated today  Social History    · Former smoker (D96 55) (J06 956)  The social history was reviewed and updated today  Current Meds   1  AmLODIPine Besylate 5 MG Oral Tablet; take 1 tablet by mouth every day; Therapy: 88NPI1306 to (Evaluate:27Jun2016); Last Rx:57Mzw1230 Ordered   2  Flonase Allergy Relief 50 MCG/ACT Nasal Suspension; USE 1 SPRAY IN EACH   NOSTRIL TWICE DAILY; Therapy: 32Xko7758 to (Last Rx:99Nfa2888) Ordered   3   Loratadine 10 MG Oral Tablet; TAKE 1 TABLET DAILY; Therapy: 80Gzz3058 to (Evaluate:97Ozo9908); Last Rx:31Yjg2983 Ordered   4  Simvastatin 20 MG Oral Tablet; Take 1 tablet daily; Therapy: 12ALO7414 to (Last Rx:18Jwf1373)  Requested for: 03Dpt6472 Ordered    The medication list was reviewed and updated today  Allergies    1  amlodipine   2  Lisinopril TABS    Vitals   Recorded: 09OHI2128 10:46AM   Heart Rate 76   Systolic 750   Diastolic 80   Height 6 ft    Weight 216 lb    BMI Calculated 29 29   BSA Calculated 2 2   O2 Saturation 99     Physical Exam    Constitutional   General appearance: No acute distress, well appearing and well nourished  Pulmonary   Respiratory effort: No increased work of breathing or signs of respiratory distress  Auscultation of lungs: Clear to auscultation, equal breath sounds bilaterally, no wheezes, no rales, no rhonci  Cardiovascular   Auscultation of heart: Normal rate and rhythm, normal S1 and S2, without murmurs  Examination of extremities for edema and/or varicosities: Normal     Skin   Skin and subcutaneous tissue: Abnormal   bilateral ankle edema          Future Appointments    Date/Time Provider Specialty Site   02/05/2016 09:40 AM Key Schmitt MD Internal Medicine [facility] INTERNAL MED     Signatures   Electronically signed by : Quentin Dorsey MD; Jan 22 2016 11:31AM EST                       (Author)

## 2018-01-11 NOTE — RESULT NOTES
Message   has appt 6/6 and results will be discussed at that visit     Verified Results  (1) CBC/PLT/DIFF 92KCS2521 07:33AM Amanda Guy    Order Number: [id number]   Order Number: [id number]     Test Name Result Flag Reference   WBC COUNT 6 95 Thousand/uL  4 31-10 16   RBC COUNT 5 29 Million/uL  3 88-5 62   HEMOGLOBIN 15 7 g/dL  12 0-17 0   HEMATOCRIT 45 5 %  36 5-49 3   MCV 86 fL  82-98   MCH 29 7 pg  26 8-34 3   MCHC 34 5 g/dL  31 4-37 4   RDW 14 3 %  11 6-15 1   MPV 11 6 fL  8 9-12 7   PLATELET COUNT 631 Thousands/uL  149-390   nRBC AUTOMATED 0 /100 WBCs     NEUTROPHILS RELATIVE PERCENT 63 %  43-75   LYMPHOCYTES RELATIVE PERCENT 24 %  14-44   MONOCYTES RELATIVE PERCENT 10 %  4-12   EOSINOPHILS RELATIVE PERCENT 2 %  0-6   BASOPHILS RELATIVE PERCENT 1 %  0-1   NEUTROPHILS ABSOLUTE COUNT 4 40 Thousands/?L  1 85-7 62   LYMPHOCYTES ABSOLUTE COUNT 1 65 Thousands/?L  0 60-4 47   MONOCYTES ABSOLUTE COUNT 0 66 Thousand/?L  0 17-1 22   EOSINOPHILS ABSOLUTE COUNT 0 14 Thousand/?L  0 00-0 61   BASOPHILS ABSOLUTE COUNT 0 09 Thousands/?L  0 00-0 10     (1) COMPREHENSIVE METABOLIC PANEL 84KIK0580 30:04YC Amanda Guy    Order Number: [id number]   Order Number: [id number] Order Number: [id number] Order Number: [id number] Order Number: [id number] Order Number: [id number]     Test Name Result Flag Reference   GLUCOSE,RANDM 98 mg/dL     If the patient is fasting, the ADA then defines impaired fasting glucose as > 100 mg/dL and diabetes as > or equal to 123 mg/dL     SODIUM 139 mmol/L  136-145   POTASSIUM 4 8 mmol/L  3 5-5 3   CHLORIDE 104 mmol/L  100-108   CARBON DIOXIDE 29 mmol/L  21-32   ANION GAP (CALC) 6 mmol/L  4-13   BLOOD UREA NITROGEN 24 mg/dL  5-25   CREATININE 1 09 mg/dL  0 60-1 30   Standardized to IDMS reference method   CALCIUM 8 8 mg/dL  8 3-10 1   BILI, TOTAL 1 23 mg/dL H 0 20-1 00   ALK PHOSPHATAS 61 U/L     ALT (SGPT) 31 U/L  12-78   AST(SGOT) 21 U/L  5-45   ALBUMIN 3 8 g/dL 3 5-5 0   TOTAL PROTEIN 7 0 g/dL  6 4-8 2   eGFR Non-African American      >60 0 ml/min/1 73sq m   [facility] Disease Education Program recommendations are as follows:  GFR calculation is accurate only with a steady state creatinine  Chronic Kidney disease less than 60 ml/min/1 73 sq  meters  Kidney failure less than 15 ml/min/1 73 sq  meters  (1) LIPID PANEL, FASTING 03Jun2016 07:33AM Yisel Mayes Order Number: [id number]  TW Order Number: [id number] Order Number: [id number] Order Number: [id number] Order Number: [id number] Order Number: [id number]     Test Name Result Flag Reference   CHOLESTEROL 156 mg/dL     HDL,DIRECT 42 mg/dL  40-60   Specimen collection should occur prior to Metamizole administration due to the potential for falsely depressed results  LDL CHOLESTEROL CALCULATED 95 mg/dL  0-100   Triglyceride:         Normal              <150 mg/dl       Borderline High    150-199 mg/dl       High               200-499 mg/dl       Very High          >499 mg/dl  Cholesterol:         Desirable        <200 mg/dl      Borderline High  200-239 mg/dl      High             >239 mg/dl  HDL Cholesterol:        High    >59 mg/dL      Low     <41 mg/dL  LDL CALCULATED:    This screening LDL is a calculated result  It does not have the accuracy of the Direct Measured LDL in the monitoring of patients with hyperlipidemia and/or statin therapy  Direct Measure LDL (BKO782) must be ordered separately in these patients  TRIGLYCERIDES 95 mg/dL  <=150   Specimen collection should occur prior to N-Acetylcysteine or Metamizole administration due to the potential for falsely depressed results       (1) TSH 09NAJ8198 07:33AM Yisel Mayes Order Number: [id number]  TW Order Number: [id number] Order Number: [id number] Order Number: [id number] Order Number: [id number] Order Number: [id number]     Test Name Result Flag Reference   TSH 2 320 uIU/mL  0 358-3 740     (1) T4, FREE 01ARH5695 07:33AM Lova Cords    Order Number: [id number]  TW Order Number: [id number] Order Number: [id number] Order Number: [id number] Order Number: [id number] Order Number: [id number]     Test Name Result Flag Reference   T4,FREE 0 88 ng/dL  0 76-1 46     (1) URINALYSIS (will reflex a microscopy if leukocytes, occult blood, protein or nitrites are not within normal limits) 50KLC5392 07:33AM Bee De La Rosa Order Number: [id number]     Test Name Result Flag Reference   COLOR Yellow     CLARITY Clear     SPECIFIC GRAVITY UA 1 022  1 003-1 030   PH UA 6 5  4 5-8 0   LEUKOCYTE ESTERASE UA Negative  Negative   NITRITE UA Negative  Negative   PROTEIN UA Negative mg/dl  Negative   GLUCOSE UA Negative mg/dl  Negative   KETONES UA Negative mg/dl  Negative   UROBILINOGEN UA 0 2 E U /dl  0 2, 1 0 E U /dl   BILIRUBIN UA Negative  Negative   BLOOD UA Negative  Negative     (1) VITAMIN B12 03Jun2016 07:33AM Bee De La Rosa Order Number: [id number]   Order Number: [id number] Order Number: [id number] Order Number: [id number] Order Number: [id number] Order Number: [id number]     Test Name Result Flag Reference   VITAMIN B12 536 pg/mL  100-900     (1) VITAMIN D 25-HYDROXY 66WAO6719 07:33AM Bee De La Rosa Order Number: [id number]  TW Order Number: [id number]     Test Name Result Flag Reference   VIT D 25-HYDROX 23 9 ng/mL L 30 0-100 0

## 2018-01-12 VITALS
HEIGHT: 72 IN | SYSTOLIC BLOOD PRESSURE: 178 MMHG | DIASTOLIC BLOOD PRESSURE: 90 MMHG | BODY MASS INDEX: 29.83 KG/M2 | WEIGHT: 220.25 LBS | HEART RATE: 62 BPM

## 2018-01-12 VITALS
WEIGHT: 222 LBS | SYSTOLIC BLOOD PRESSURE: 138 MMHG | OXYGEN SATURATION: 98 % | DIASTOLIC BLOOD PRESSURE: 60 MMHG | HEART RATE: 60 BPM | BODY MASS INDEX: 29.42 KG/M2 | HEIGHT: 73 IN | TEMPERATURE: 98.6 F

## 2018-01-12 NOTE — PROGRESS NOTES
Assessment    1  Benign hypertension (401 1) (I10)    Plan  Benign hypertension    · Follow-up visit in 1 month Evaluation and Treatment  Follow-up  Status: Complete  Done: 54NRC9291    Discussion/Summary  Discussion Summary:   Will increase dose and monitor  Counseling Documentation With Imm: The patient was counseled regarding instructions for management, impressions  Chief Complaint  Chief Complaint Free Text Note Form: BLOOD PRESSURE CHECK  MEDICATION CHANGED  Milo Orellana Chief Complaint Chronic Condition [facility]: Patient is here today for follow up of chronic conditions described in HPI  History of Present Illness  HPI: Here for followup  Pressure was initially high but came down upon repeat  He still has some episodic swelling but not like before  Admits he could do better with his salt  Review of Systems  Complete-Male:   Cardiovascular: no chest pain  Respiratory: no shortness of breath  Active Problems    1  Allergic rhinitis (477 9) (J30 9)   2  Benign hypertension (401 1) (I10)   3  Bilateral edema of lower extremity (782 3) (R60 0)   4  Depression (311) (F32 9)   5  Hyperlipidemia (272 4) (E78 5)   6  Need for influenza vaccination (V04 81) (Z23)   7  Screening for colon cancer (V76 51) (Z12 11)   8  Screening for prostate cancer (V76 44) (Z12 5)   9  Sea sickness (994 6) (T75 3XXA)    Past Medical History  Active Problems And Past Medical History Reviewed: The active problems and past medical history were reviewed and updated today  Surgical History    1  History of Abdominal Surgery   2  History of Knee Arthroscopy   3  History of Nasal Septal Deviation Repair   4  History of Tonsillectomy    Family History    1  Family history of    2  Family history of Migraines    3  Family history of Brain cancer   4  Family history of    5  Family history of Heart disease   6  Family history of Hyperlipidemia   7   Family history of Hypertension    Social History    · Former smoker (V15 82) (V31 760)    Current Meds   1  Atenolol 50 MG Oral Tablet; TAKE 1 TABLET DAILY AS DIRECTED; Therapy: 41UAR4090 to (Evaluate:30Jan2017)  Requested for: 36XZD8951 Recorded   2  Flonase Allergy Relief 50 MCG/ACT Nasal Suspension; USE 1 SPRAY IN EACH NOSTRIL TWICE   DAILY; Therapy: 67Qfk8332 to (Last Rx:56Dpw6083) Ordered   3  Loratadine 10 MG Oral Tablet; TAKE 1 TABLET DAILY; Therapy: 79Ofy3883 to (Evaluate:05Sep2015); Last Rx:21Rxi9260 Ordered   4  Simvastatin 20 MG Oral Tablet; Take 1 tablet daily; Therapy: 18YCF5623 to (Last Dinwiddie Aliment)  Requested for: 36LEM9163 Ordered  Medication List Reviewed: The medication list was reviewed and updated today  Allergies    1  amlodipine   2  Lisinopril TABS    Vitals  Vital Signs [Data Includes: Current Encounter]    Recorded: 73XWH6601 09:52AM   Heart Rate 76   Systolic 237   Diastolic 80   Height 6 ft    Weight 215 lb    BMI Calculated 29 16   BSA Calculated 2 2     Physical Exam    Constitutional   General appearance: No acute distress, well appearing and well nourished  Pulmonary   Respiratory effort: No increased work of breathing or signs of respiratory distress  Auscultation of lungs: Clear to auscultation, equal breath sounds bilaterally, no wheezes, no rales, no rhonci  Cardiovascular   Auscultation of heart: Normal rate and rhythm, normal S1 and S2, without murmurs  Examination of extremities for edema and/or varicosities: Abnormal   trace ankle  Abdomen   Abdomen: Non-tender, no masses  Liver and spleen: No hepatomegaly or splenomegaly  Psychiatric   Orientation to person, place and time: Normal     Mood and affect: Normal          Health Management  Screening for colon cancer   COLONOSCOPY; every 10 years; Last 66Qmi6376; Next Due: 11ZFQ0877; Active  Screening for prostate cancer   (1) PSA (SCREEN) (Dx V76 44 Screen for Prostate Cancer); every 1 year; Last 84CWN6250; Next  Due: 29LFY9694;  Active    Future Appointments    Date/Time Provider Specialty Site   03/04/2016 04:00 PM Bridgette Caputo MD Internal Medicine [facility] INTERNAL MED     Signatures   Electronically signed by : Yamilka Adams MD; Feb 5 2016 10:30AM EST                       (Author)

## 2018-01-13 VITALS
HEART RATE: 60 BPM | BODY MASS INDEX: 30.65 KG/M2 | HEIGHT: 72 IN | WEIGHT: 226.25 LBS | SYSTOLIC BLOOD PRESSURE: 163 MMHG | DIASTOLIC BLOOD PRESSURE: 81 MMHG

## 2018-01-13 VITALS
HEIGHT: 72 IN | WEIGHT: 227.25 LBS | DIASTOLIC BLOOD PRESSURE: 80 MMHG | SYSTOLIC BLOOD PRESSURE: 137 MMHG | HEART RATE: 63 BPM | BODY MASS INDEX: 30.78 KG/M2

## 2018-01-13 VITALS
WEIGHT: 225.5 LBS | SYSTOLIC BLOOD PRESSURE: 128 MMHG | HEIGHT: 72 IN | OXYGEN SATURATION: 98 % | HEART RATE: 75 BPM | BODY MASS INDEX: 30.54 KG/M2 | DIASTOLIC BLOOD PRESSURE: 70 MMHG

## 2018-01-13 VITALS
OXYGEN SATURATION: 96 % | SYSTOLIC BLOOD PRESSURE: 140 MMHG | BODY MASS INDEX: 29.97 KG/M2 | WEIGHT: 221.25 LBS | HEIGHT: 72 IN | DIASTOLIC BLOOD PRESSURE: 70 MMHG | HEART RATE: 76 BPM

## 2018-01-13 VITALS
OXYGEN SATURATION: 96 % | HEIGHT: 72 IN | SYSTOLIC BLOOD PRESSURE: 168 MMHG | HEART RATE: 66 BPM | DIASTOLIC BLOOD PRESSURE: 84 MMHG | WEIGHT: 222.5 LBS | BODY MASS INDEX: 30.14 KG/M2

## 2018-01-13 VITALS
BODY MASS INDEX: 25.92 KG/M2 | WEIGHT: 224 LBS | SYSTOLIC BLOOD PRESSURE: 161 MMHG | DIASTOLIC BLOOD PRESSURE: 88 MMHG | HEART RATE: 65 BPM | HEIGHT: 78 IN

## 2018-01-13 VITALS
BODY MASS INDEX: 30.65 KG/M2 | HEIGHT: 72 IN | WEIGHT: 226.25 LBS | HEART RATE: 70 BPM | SYSTOLIC BLOOD PRESSURE: 169 MMHG | DIASTOLIC BLOOD PRESSURE: 84 MMHG

## 2018-01-14 VITALS
DIASTOLIC BLOOD PRESSURE: 79 MMHG | HEART RATE: 68 BPM | HEIGHT: 72 IN | SYSTOLIC BLOOD PRESSURE: 150 MMHG | WEIGHT: 226.25 LBS | BODY MASS INDEX: 30.65 KG/M2 | OXYGEN SATURATION: 99 %

## 2018-01-15 VITALS
DIASTOLIC BLOOD PRESSURE: 77 MMHG | BODY MASS INDEX: 30.51 KG/M2 | WEIGHT: 225.25 LBS | SYSTOLIC BLOOD PRESSURE: 171 MMHG | HEART RATE: 59 BPM | HEIGHT: 72 IN

## 2018-01-15 NOTE — MISCELLANEOUS
Message  Received message the patient called with questions about surgery  Attempted to return patient's phone call but there is no answer  Message was left on his voicemail        Signatures   Electronically signed by : CHRISTOPHER Reeder ; Jan 17 2017  4:07PM EST                       (Author)

## 2018-01-17 NOTE — MISCELLANEOUS
Message   Recorded as Task   Date: 01/17/2017 03:46 PM, Created By: John Hoffman   Task Name: Call Back   Assigned To: Shyla Shelby   Regarding Patient: Ivy Monte, Status: In Progress   Francinekeeley Aubreytheresejaswinder - 17 Jan 2017 3:46 PM     TASK CREATED  pt will like call back at [phone] regarding questions to upcoming sx  Yolanda Baltazar - 17 Jan 2017 4:06 PM     TASK IN PROGRESS   Returned patient's phone call  Answered all questions regarding surgery and postop instructions        Signatures   Electronically signed by : CHRISTOPHER Mahoney ; Jan 18 2017 10:39AM EST                       (Author)

## 2018-01-17 NOTE — PROGRESS NOTES
Assessment    1  Aftercare following surgery of the musculoskeletal system (V58 78) (Z47 89)   2  Bicipital tendonitis of shoulder, right (726 12) (M75 21)   3  Complete tear of right rotator cuff (727 61) (M75 121)    6 weeks status post right shoulder arthroscopic rotator cuff repair doing well     Plan  Complete tear of right rotator cuff    · Follow-up visit in 3 weeks Evaluation and Treatment  Follow-up  Status: Hold For -  Scheduling  Requested for: [id number]    Recommend he continue with physical therapy  Continue with massive rotator cuff repair protocol  There are no limits on the range of motion  New referral was provided today    He may discontinue the sling  He may begin to drive for short distances only  Okay to return to work in approximately 2 weeks' time with no use of the right arm for lifting, overhead use, repetitive pushing and pulling    Follow up 3 weeks     Chief Complaint    1  Shoulder Pain    Post-Op  HPI: 79-year-old male here for follow-up after right shoulder massive rotator cuff repair  He just started physical therapy 1 week ago  His pain is improving  He had some mild pain the day after his first therapy session but is overall feeling well      Review of Systems    Constitutional: No fever or chills, feels well, no tiredness, no recent weight loss or weight gain  Eyes: No complaints of red eyes, no eyesight problems  ENT: no complaints of loss of hearing, no nosebleeds, no sore throat  Cardiovascular: No complaints of chest pain, no palpitations, no leg claudication or lower extremity edema  Respiratory: No complaints of shortness of breath, no wheezing, no cough  Gastrointestinal: No complaints of abdominal pain, no constipation, no nausea or vomiting, no diarrhea or bloody stools  Genitourinary: No complaints of dysuria or incontinence, no hesitancy, no nocturia  Musculoskeletal: as noted in HPI     Integumentary: No complaints of skin rash or lesion, no itching or dry skin, no skin wounds  Neurological: No complaints of headache, no confusion, no numbness or tingling, no dizziness  Psychiatric: No suicidal thoughts, no anxiety, no depression  Endocrine: No muscle weakness, no frequent urination, no excessive thirst, no feelings of weakness  Active Problems    1  Accidental fall, initial encounter (E888 9) (W19 XXXA)   2  Aftercare following surgery of the musculoskeletal system (V58 78) (Z47 89)   3  Allergic rhinitis (477 9) (J30 9)   4  Benign hypertension (401 1) (I10)   5  Bicipital tendonitis of shoulder, right (726 12) (M75 21)   6  Bilateral edema of lower extremity (782 3) (R60 0)   7  Complete tear of right rotator cuff (727 61) (M75 121)   8  Depression (311) (F32 9)   9  Hemorrhoids (455 6) (K64 9)   10  Hyperlipidemia (272 4) (E78 5)   11  Need for influenza vaccination (V04 81) (Z23)   12  Preop exam for internal medicine (V72 83) (Z01 818)   13  Screening for colon cancer (V76 51) (Z12 11)   14  Screening for prostate cancer (V76 44) (Z12 5)   15  Sea sickness (994 6) (T75 3XXA)   16  Visit for pre-operative examination (V72 84) (Z01 818)   17  Vitamin D deficiency (268 9) (E55 9)    Social History    · Former smoker (V15 82) (T05 688)   · Denied: History of High risk sexual behavior   · Denied: History of Illicit drug use   ·    · Part-time employment   · Rarely consumes alcohol (V49 89) (Z78 9)  The social history was reviewed and updated today  The social history was reviewed and is unchanged  Current Meds   1  Atenolol 50 MG Oral Tablet; Take 1 tablet daily; Therapy: 62KIE4485 to (Evaluate:55Kof1226)  Requested for: 45Rai5161; Last   Rx:59Ibe4814 Ordered   2  FLUoxetine HCl - 10 MG Oral Capsule; take 1 capsule daily; Therapy: 24UXP6547 to (Last Rx:33Uri1721)  Requested for: 89Vvv9233 Ordered   3  Hydrocortisone 2 5 % Rectal Cream; INSERT 1 APPLICATORFUL RECTALLY 3 TIMES   DAILY;    Therapy: 84QQP0663 to (Last Rx:93Tll6964) Requested for: 04BNJ4649 Ordered   4  Losartan Potassium 25 MG Oral Tablet; TAKE 1 TABLET DAILY AS DIRECTED; Therapy: 94MWL1276 to (Evaluate:98Eja6092)  Requested for: 21QXQ4562; Last   Rx:12Jan2017 Ordered   5  Simvastatin 20 MG Oral Tablet; Take 1 tablet daily; Therapy: 63SHS3539 to (Darcy Barnett)  Requested for: 19TGD5571; Last   Rx:06Nov2016 Ordered   6  Vitamin D3 2000 UNIT Oral Capsule; take 1 capsule daily; Therapy: 06XJJ7781 to (Evaluate:21Gtu3605); Last Rx:06Jun2016 Ordered    The medication list was reviewed and updated today  Allergies    1  amlodipine   2  Lisinopril TABS    Vitals   Recorded: 72CCX5324 01:59PM   Heart Rate 59   Systolic 066   Diastolic 77   Height 6 ft    Weight 225 lb 4 00 oz   BMI Calculated 30 55   BSA Calculated 2 24     Physical Exam    Constitutional - General appearance: Normal    Right shoulder: No tenderness  Passive forward elevation is to 90 degrees   Passive external rotation of 45 degrees   Motor is intact in elbow flexion elbow extension, wrist flexion, wrist extension, interossei  Sensation is intact to light touch in all nerve distributions  Hand is warm and well-perfused      Message  Return to work or school:   Danilo More is under my professional care  He was seen in my office on 3/9/17   He is able to return to work on  3/27/17    He is able to work with limitations (no lifting right arm, no repetitive push/pull right arm, no overhead use right arm)  Future Appointments    Date/Time Provider Specialty Site   03/30/2017 05:15 PM CHRISTOPHER Florence   Orthopedic Surgery [facility]   05/22/2017 03:40 PM Bridgette Caputo MD Internal Medicine [facility]     Signatures   Electronically signed by : CHRISTOPHER Black ; Mar  9 2017  2:26PM EST                       (Author)

## 2018-01-22 VITALS
HEART RATE: 65 BPM | TEMPERATURE: 97.1 F | WEIGHT: 220.5 LBS | OXYGEN SATURATION: 96 % | DIASTOLIC BLOOD PRESSURE: 72 MMHG | SYSTOLIC BLOOD PRESSURE: 128 MMHG | BODY MASS INDEX: 29.22 KG/M2 | HEIGHT: 73 IN

## 2018-01-29 DIAGNOSIS — E78.5 HYPERLIPIDEMIA, UNSPECIFIED HYPERLIPIDEMIA TYPE: Primary | ICD-10-CM

## 2018-01-29 RX ORDER — SIMVASTATIN 20 MG
TABLET ORAL
Qty: 90 TABLET | Refills: 0 | Status: SHIPPED | OUTPATIENT
Start: 2018-01-29 | End: 2018-05-04 | Stop reason: SDUPTHER

## 2018-02-05 DIAGNOSIS — F32.89 OTHER DEPRESSION: Primary | ICD-10-CM

## 2018-02-05 RX ORDER — FLUOXETINE 10 MG/1
CAPSULE ORAL
Qty: 90 CAPSULE | Refills: 0 | Status: SHIPPED | OUTPATIENT
Start: 2018-02-05 | End: 2018-05-06 | Stop reason: SDUPTHER

## 2018-05-04 DIAGNOSIS — E78.5 HYPERLIPIDEMIA, UNSPECIFIED HYPERLIPIDEMIA TYPE: ICD-10-CM

## 2018-05-04 RX ORDER — SIMVASTATIN 20 MG
20 TABLET ORAL DAILY
Qty: 90 TABLET | Refills: 3 | Status: SHIPPED | OUTPATIENT
Start: 2018-05-04 | End: 2019-04-29 | Stop reason: SDUPTHER

## 2018-05-06 DIAGNOSIS — F32.89 OTHER DEPRESSION: ICD-10-CM

## 2018-05-07 RX ORDER — FLUOXETINE 10 MG/1
CAPSULE ORAL
Qty: 90 CAPSULE | Refills: 0 | Status: SHIPPED | OUTPATIENT
Start: 2018-05-07 | End: 2018-10-05 | Stop reason: SDUPTHER

## 2018-06-23 DIAGNOSIS — I10 ESSENTIAL HYPERTENSION: Primary | ICD-10-CM

## 2018-06-24 RX ORDER — ATENOLOL 50 MG/1
TABLET ORAL
Qty: 30 TABLET | Refills: 0 | Status: SHIPPED | OUTPATIENT
Start: 2018-06-24 | End: 2018-10-04 | Stop reason: SDUPTHER

## 2018-07-02 ENCOUNTER — TELEPHONE (OUTPATIENT)
Dept: INTERNAL MEDICINE CLINIC | Facility: CLINIC | Age: 68
End: 2018-07-02

## 2018-08-05 DIAGNOSIS — F32.89 OTHER DEPRESSION: ICD-10-CM

## 2018-08-05 RX ORDER — FLUOXETINE 10 MG/1
CAPSULE ORAL
Qty: 90 CAPSULE | Refills: 0 | OUTPATIENT
Start: 2018-08-05

## 2018-10-04 DIAGNOSIS — I10 ESSENTIAL HYPERTENSION: ICD-10-CM

## 2018-10-05 DIAGNOSIS — F32.89 OTHER DEPRESSION: ICD-10-CM

## 2018-10-05 RX ORDER — FLUOXETINE 10 MG/1
10 CAPSULE ORAL DAILY
Qty: 90 CAPSULE | Refills: 1 | Status: SHIPPED | OUTPATIENT
Start: 2018-10-05 | End: 2019-01-08 | Stop reason: SDUPTHER

## 2018-10-05 RX ORDER — ATENOLOL 50 MG/1
TABLET ORAL
Qty: 30 TABLET | Refills: 0 | Status: SHIPPED | OUTPATIENT
Start: 2018-10-05 | End: 2018-11-06 | Stop reason: SDUPTHER

## 2018-10-12 ENCOUNTER — APPOINTMENT (OUTPATIENT)
Dept: LAB | Facility: HOSPITAL | Age: 68
End: 2018-10-12
Attending: INTERNAL MEDICINE
Payer: COMMERCIAL

## 2018-10-12 DIAGNOSIS — I10 ESSENTIAL (PRIMARY) HYPERTENSION: ICD-10-CM

## 2018-10-12 DIAGNOSIS — E55.9 VITAMIN D DEFICIENCY: ICD-10-CM

## 2018-10-12 DIAGNOSIS — Z12.5 ENCOUNTER FOR SCREENING FOR MALIGNANT NEOPLASM OF PROSTATE: ICD-10-CM

## 2018-10-12 DIAGNOSIS — R73.01 IMPAIRED FASTING GLUCOSE: ICD-10-CM

## 2018-10-12 LAB
25(OH)D3 SERPL-MCNC: 24.1 NG/ML (ref 30–100)
ALBUMIN SERPL BCP-MCNC: 3.6 G/DL (ref 3.5–5)
ALP SERPL-CCNC: 57 U/L (ref 46–116)
ALT SERPL W P-5'-P-CCNC: 28 U/L (ref 12–78)
ANION GAP SERPL CALCULATED.3IONS-SCNC: 4 MMOL/L (ref 4–13)
AST SERPL W P-5'-P-CCNC: 19 U/L (ref 5–45)
BASOPHILS # BLD AUTO: 0.09 THOUSANDS/ΜL (ref 0–0.1)
BASOPHILS NFR BLD AUTO: 1 % (ref 0–1)
BILIRUB SERPL-MCNC: 1.2 MG/DL (ref 0.2–1)
BUN SERPL-MCNC: 17 MG/DL (ref 5–25)
CALCIUM SERPL-MCNC: 8.8 MG/DL (ref 8.3–10.1)
CHLORIDE SERPL-SCNC: 104 MMOL/L (ref 100–108)
CHOLEST SERPL-MCNC: 152 MG/DL (ref 50–200)
CO2 SERPL-SCNC: 31 MMOL/L (ref 21–32)
CREAT SERPL-MCNC: 1.17 MG/DL (ref 0.6–1.3)
EOSINOPHIL # BLD AUTO: 0.2 THOUSAND/ΜL (ref 0–0.61)
EOSINOPHIL NFR BLD AUTO: 3 % (ref 0–6)
ERYTHROCYTE [DISTWIDTH] IN BLOOD BY AUTOMATED COUNT: 13.7 % (ref 11.6–15.1)
EST. AVERAGE GLUCOSE BLD GHB EST-MCNC: 131 MG/DL
GFR SERPL CREATININE-BSD FRML MDRD: 65 ML/MIN/1.73SQ M
GLUCOSE P FAST SERPL-MCNC: 117 MG/DL (ref 65–99)
HBA1C MFR BLD: 6.2 % (ref 4.2–6.3)
HCT VFR BLD AUTO: 49 % (ref 36.5–49.3)
HDLC SERPL-MCNC: 68 MG/DL (ref 40–60)
HGB BLD-MCNC: 16.7 G/DL (ref 12–17)
IMM GRANULOCYTES # BLD AUTO: 0.04 THOUSAND/UL (ref 0–0.2)
IMM GRANULOCYTES NFR BLD AUTO: 1 % (ref 0–2)
LDLC SERPL CALC-MCNC: 51 MG/DL (ref 0–100)
LYMPHOCYTES # BLD AUTO: 1.63 THOUSANDS/ΜL (ref 0.6–4.47)
LYMPHOCYTES NFR BLD AUTO: 26 % (ref 14–44)
MCH RBC QN AUTO: 30.3 PG (ref 26.8–34.3)
MCHC RBC AUTO-ENTMCNC: 34.1 G/DL (ref 31.4–37.4)
MCV RBC AUTO: 89 FL (ref 82–98)
MONOCYTES # BLD AUTO: 0.56 THOUSAND/ΜL (ref 0.17–1.22)
MONOCYTES NFR BLD AUTO: 9 % (ref 4–12)
NEUTROPHILS # BLD AUTO: 3.71 THOUSANDS/ΜL (ref 1.85–7.62)
NEUTS SEG NFR BLD AUTO: 60 % (ref 43–75)
NONHDLC SERPL-MCNC: 84 MG/DL
NRBC BLD AUTO-RTO: 0 /100 WBCS
PLATELET # BLD AUTO: 246 THOUSANDS/UL (ref 149–390)
PMV BLD AUTO: 10.4 FL (ref 8.9–12.7)
POTASSIUM SERPL-SCNC: 4.9 MMOL/L (ref 3.5–5.3)
PROT SERPL-MCNC: 6.8 G/DL (ref 6.4–8.2)
PSA SERPL-MCNC: 2.3 NG/ML (ref 0–4)
RBC # BLD AUTO: 5.51 MILLION/UL (ref 3.88–5.62)
SODIUM SERPL-SCNC: 139 MMOL/L (ref 136–145)
TRIGL SERPL-MCNC: 166 MG/DL
WBC # BLD AUTO: 6.23 THOUSAND/UL (ref 4.31–10.16)

## 2018-10-12 PROCEDURE — 82306 VITAMIN D 25 HYDROXY: CPT

## 2018-10-12 PROCEDURE — 36415 COLL VENOUS BLD VENIPUNCTURE: CPT

## 2018-10-12 PROCEDURE — 83036 HEMOGLOBIN GLYCOSYLATED A1C: CPT

## 2018-10-12 PROCEDURE — G0103 PSA SCREENING: HCPCS

## 2018-10-12 PROCEDURE — 80061 LIPID PANEL: CPT

## 2018-10-12 PROCEDURE — 85025 COMPLETE CBC W/AUTO DIFF WBC: CPT

## 2018-10-12 PROCEDURE — 80053 COMPREHEN METABOLIC PANEL: CPT

## 2018-10-19 ENCOUNTER — OFFICE VISIT (OUTPATIENT)
Dept: INTERNAL MEDICINE CLINIC | Facility: CLINIC | Age: 68
End: 2018-10-19
Payer: COMMERCIAL

## 2018-10-19 VITALS
BODY MASS INDEX: 31.42 KG/M2 | DIASTOLIC BLOOD PRESSURE: 80 MMHG | SYSTOLIC BLOOD PRESSURE: 130 MMHG | WEIGHT: 232 LBS | OXYGEN SATURATION: 95 % | HEART RATE: 72 BPM | HEIGHT: 72 IN

## 2018-10-19 DIAGNOSIS — E55.9 VITAMIN D DEFICIENCY: ICD-10-CM

## 2018-10-19 DIAGNOSIS — I10 BENIGN HYPERTENSION: ICD-10-CM

## 2018-10-19 DIAGNOSIS — R73.01 IMPAIRED FASTING GLUCOSE: ICD-10-CM

## 2018-10-19 DIAGNOSIS — E78.2 MIXED HYPERLIPIDEMIA: ICD-10-CM

## 2018-10-19 DIAGNOSIS — F32.A DEPRESSION, UNSPECIFIED DEPRESSION TYPE: ICD-10-CM

## 2018-10-19 DIAGNOSIS — Z23 NEED FOR INFLUENZA VACCINATION: Primary | ICD-10-CM

## 2018-10-19 PROCEDURE — 1160F RVW MEDS BY RX/DR IN RCRD: CPT

## 2018-10-19 PROCEDURE — 1160F RVW MEDS BY RX/DR IN RCRD: CPT | Performed by: INTERNAL MEDICINE

## 2018-10-19 PROCEDURE — 1101F PT FALLS ASSESS-DOCD LE1/YR: CPT | Performed by: INTERNAL MEDICINE

## 2018-10-19 PROCEDURE — 90471 IMMUNIZATION ADMIN: CPT

## 2018-10-19 PROCEDURE — 90662 IIV NO PRSV INCREASED AG IM: CPT

## 2018-10-19 PROCEDURE — 99214 OFFICE O/P EST MOD 30 MIN: CPT | Performed by: INTERNAL MEDICINE

## 2018-10-19 PROCEDURE — 3008F BODY MASS INDEX DOCD: CPT | Performed by: INTERNAL MEDICINE

## 2018-10-19 NOTE — PROGRESS NOTES
Assessment/Plan:      Chronic problems are stable  Continue present medications  Continue diet and exercise  Ordered labs for next visit  Return in about 4 months (around 2/19/2019)  No problem-specific Assessment & Plan notes found for this encounter  Diagnoses and all orders for this visit:    Need for influenza vaccination  -     Cancel: influenza vaccine, 1627-8686, quadrivalent, recombinant, PF, 0 5 mL, for patients 18 yr+ (FLUBLOK)  -     influenza vaccine, 1220-2714, high-dose, PF 0 5 mL, for patients 65 yr+ (FLUZONE HIGH-DOSE)    Benign hypertension  -     CBC and differential; Future  -     Comprehensive metabolic panel; Future  -     Lipid panel; Future    Impaired fasting glucose  -     Hemoglobin A1C; Future    Mixed hyperlipidemia    Vitamin D deficiency    Depression, unspecified depression type          Subjective:      Patient ID: Martine Hilliard is a 79 y o  male  Patient comes in today for routine follow-up after not being here in a while  His blood pressure remains controlled  His blood work shows his sugar and cholesterol are controlled  He is trying to watch his diet but still gets hungry from time to time  Then he snacks on carbs  Depression is doing okay  Blood work also shows his vitamin-D has improved but he admits he has not been taking the medicine  He still has his joint pains and fatigue  But otherwise denies any new complaints  Reports no further additions to his history          ALLERGIES:  Allergies   Allergen Reactions    Amlodipine Swelling    Lisinopril      Other reaction(s): Cough  Other reaction(s): Cough       CURRENT MEDICATIONS:    Current Outpatient Prescriptions:     aspirin 81 MG tablet, Take 81 mg by mouth daily, Disp: , Rfl:     atenolol (TENORMIN) 50 mg tablet, TAKE 1 TABLET DAILY, Disp: 30 tablet, Rfl: 0    esomeprazole (NexIUM) 20 mg capsule, Take 20 mg by mouth every morning before breakfast, Disp: , Rfl:     FLUoxetine (PROzac) 10 mg capsule, Take 1 capsule (10 mg total) by mouth daily, Disp: 90 capsule, Rfl: 1    losartan (COZAAR) 25 mg tablet, Take 25 mg by mouth daily, Disp: , Rfl:     naproxen (NAPROSYN) 250 mg tablet, Take 250 mg by mouth daily, Disp: , Rfl:     simvastatin (ZOCOR) 20 mg tablet, Take 1 tablet (20 mg total) by mouth daily, Disp: 90 tablet, Rfl: 3    Cholecalciferol (VITAMIN D3) 2000 UNITS capsule, Take by mouth, Disp: , Rfl:     docusate sodium (COLACE) 100 mg capsule, Take 1 capsule by mouth 2 (two) times a day for 30 days, Disp: 60 capsule, Rfl: 0    ACTIVE PROBLEM LIST:  Patient Active Problem List   Diagnosis    Complete tear of left rotator cuff    Biceps tendinitis    Benign hypertension    Allergic rhinitis    Hyperlipidemia    Depression    Impaired fasting glucose    Vitamin D deficiency       PAST MEDICAL HISTORY:  Past Medical History:   Diagnosis Date    Hyperlipidemia     Hypertension        PAST SURGICAL HISTORY:  Past Surgical History:   Procedure Laterality Date    ABDOMINAL SURGERY      KNEE ARTHROSCOPY      NASAL SINUS SURGERY      MS ARTHROSCOPY SHOULDER SURGICAL BICEPS TENODESIS Right 1/25/2017    Procedure: ARTHROSCOPIC BICEPS TENODESIS;  Surgeon: Marlan Oppenheim, MD;  [facility];  Service: Orthopedics    MS ALVARO ARTHROSCOP,EXTEN DEBRIDE Right 1/25/2017    Procedure: ARTHROSCOPY SHOULDER;  Surgeon: Marlan Oppenheim, MD;  [facility];  Service: Orthopedics    MS ALVARO ARTHROSCOP,SURG,W/ROTAT CUFF REPR Right 1/25/2017    Procedure: SHOULDER ARTHROSCOPY ROTATOR CUFF REPAIR ;  Surgeon: Marlan Oppenheim, MD;  [facility];  Service: Orthopedics    TONSILLECTOMY         FAMILY HISTORY:  Family History   Problem Relation Age of Onset    Cancer Father     Heart disease Father     Hyperlipidemia Father     Hypertension Father     Brain cancer Father     Migraines Mother        SOCIAL HISTORY:  Social History     Social History    Marital status: /Civil [employer]     Spouse name: N/A    Number of children: N/A    Years of education: N/A     Occupational History    Part time      Social History Main Topics    Smoking status: Former Smoker    Smokeless tobacco: Not on file    Alcohol use Yes      Comment: social    Drug use: No    Sexual activity: Not on file      Comment: Denied high risk sexual behavior     Other Topics Concern    Not on file     Social History Narrative    No narrative on file       Review of Systems   Respiratory: Negative for shortness of breath  Cardiovascular: Negative for chest pain  Gastrointestinal: Negative for abdominal pain  Objective:  Vitals:    10/19/18 0840   BP: 130/80   BP Location: Left arm   Patient Position: Sitting   Pulse: 72   SpO2: 95%   Weight: 105 kg (232 lb)   Height: 6' (1 829 m)        Physical Exam   Constitutional: He is oriented to person, place, and time  He appears well-developed and well-nourished  Cardiovascular: Normal rate, regular rhythm and normal heart sounds  Pulmonary/Chest: Effort normal and breath sounds normal    Abdominal: Soft  Bowel sounds are normal    Musculoskeletal: He exhibits no edema  Neurological: He is alert and oriented to person, place, and time  Nursing note and vitals reviewed          RESULTS:    Recent Results (from the past 1008 hour(s))   CBC and differential    Collection Time: 10/12/18 10:14 AM   Result Value Ref Range    WBC 6 23 4 31 - 10 16 Thousand/uL    RBC 5 51 3 88 - 5 62 Million/uL    Hemoglobin 16 7 12 0 - 17 0 g/dL    Hematocrit 49 0 36 5 - 49 3 %    MCV 89 82 - 98 fL    MCH 30 3 26 8 - 34 3 pg    MCHC 34 1 31 4 - 37 4 g/dL    RDW 13 7 11 6 - 15 1 %    MPV 10 4 8 9 - 12 7 fL    Platelets 724 320 - 773 Thousands/uL    nRBC 0 /100 WBCs    Neutrophils Relative 60 43 - 75 %    Immat GRANS % 1 0 - 2 %    Lymphocytes Relative 26 14 - 44 %    Monocytes Relative 9 4 - 12 %    Eosinophils Relative 3 0 - 6 %    Basophils Relative 1 0 - 1 % Neutrophils Absolute 3 71 1 85 - 7 62 Thousands/µL    Immature Grans Absolute 0 04 0 00 - 0 20 Thousand/uL    Lymphocytes Absolute 1 63 0 60 - 4 47 Thousands/µL    Monocytes Absolute 0 56 0 17 - 1 22 Thousand/µL    Eosinophils Absolute 0 20 0 00 - 0 61 Thousand/µL    Basophils Absolute 0 09 0 00 - 0 10 Thousands/µL   Comprehensive metabolic panel    Collection Time: 10/12/18 10:14 AM   Result Value Ref Range    Sodium 139 136 - 145 mmol/L    Potassium 4 9 3 5 - 5 3 mmol/L    Chloride 104 100 - 108 mmol/L    CO2 31 21 - 32 mmol/L    ANION GAP 4 4 - 13 mmol/L    BUN 17 5 - 25 mg/dL    Creatinine 1 17 0 60 - 1 30 mg/dL    Glucose, Fasting 117 (H) 65 - 99 mg/dL    Calcium 8 8 8 3 - 10 1 mg/dL    AST 19 5 - 45 U/L    ALT 28 12 - 78 U/L    Alkaline Phosphatase 57 46 - 116 U/L    Total Protein 6 8 6 4 - 8 2 g/dL    Albumin 3 6 3 5 - 5 0 g/dL    Total Bilirubin 1 20 (H) 0 20 - 1 00 mg/dL    eGFR 65 ml/min/1 73sq m   Lipid panel    Collection Time: 10/12/18 10:14 AM   Result Value Ref Range    Cholesterol 152 50 - 200 mg/dL    Triglycerides 166 (H) <=150 mg/dL    HDL, Direct 68 (H) 40 - 60 mg/dL    LDL Calculated 51 0 - 100 mg/dL    Non-HDL-Chol (CHOL-HDL) 84 mg/dl   Hemoglobin A1c    Collection Time: 10/12/18 10:14 AM   Result Value Ref Range    Hemoglobin A1C 6 2 4 2 - 6 3 %     mg/dl   Vitamin D 25 hydroxy    Collection Time: 10/12/18 10:14 AM   Result Value Ref Range    Vit D, 25-Hydroxy 24 1 (L) 30 0 - 100 0 ng/mL   PSA    Collection Time: 10/12/18 10:14 AM   Result Value Ref Range    PSA 2 3 0 0 - 4 0 ng/mL       This note was created with voice recognition software  Phonic, grammatical and spelling errors may be present within the note as a result

## 2018-11-06 DIAGNOSIS — I10 ESSENTIAL HYPERTENSION: ICD-10-CM

## 2018-11-07 RX ORDER — ATENOLOL 50 MG/1
TABLET ORAL
Qty: 30 TABLET | Refills: 5 | Status: SHIPPED | OUTPATIENT
Start: 2018-11-07 | End: 2019-06-03 | Stop reason: SDUPTHER

## 2018-12-07 DIAGNOSIS — I10 BENIGN HYPERTENSION: ICD-10-CM

## 2018-12-07 RX ORDER — LOSARTAN POTASSIUM AND HYDROCHLOROTHIAZIDE 12.5; 5 MG/1; MG/1
TABLET ORAL
Qty: 90 TABLET | Refills: 3 | OUTPATIENT
Start: 2018-12-07

## 2019-01-08 DIAGNOSIS — F32.89 OTHER DEPRESSION: ICD-10-CM

## 2019-01-08 RX ORDER — FLUOXETINE 10 MG/1
10 CAPSULE ORAL DAILY
Qty: 90 CAPSULE | Refills: 3 | Status: SHIPPED | OUTPATIENT
Start: 2019-01-08 | End: 2019-04-03 | Stop reason: SDUPTHER

## 2019-04-03 DIAGNOSIS — F32.89 OTHER DEPRESSION: ICD-10-CM

## 2019-04-03 RX ORDER — FLUOXETINE 10 MG/1
CAPSULE ORAL
Qty: 90 CAPSULE | Refills: 1 | Status: SHIPPED | OUTPATIENT
Start: 2019-04-03 | End: 2019-09-30 | Stop reason: SDUPTHER

## 2019-04-29 DIAGNOSIS — E78.5 HYPERLIPIDEMIA, UNSPECIFIED HYPERLIPIDEMIA TYPE: ICD-10-CM

## 2019-04-29 RX ORDER — SIMVASTATIN 20 MG
TABLET ORAL
Qty: 90 TABLET | Refills: 0 | Status: SHIPPED | OUTPATIENT
Start: 2019-04-29 | End: 2019-07-28 | Stop reason: SDUPTHER

## 2019-05-25 ENCOUNTER — APPOINTMENT (OUTPATIENT)
Dept: LAB | Facility: HOSPITAL | Age: 69
End: 2019-05-25
Attending: INTERNAL MEDICINE
Payer: COMMERCIAL

## 2019-05-25 DIAGNOSIS — I10 BENIGN HYPERTENSION: ICD-10-CM

## 2019-05-25 DIAGNOSIS — R73.01 IMPAIRED FASTING GLUCOSE: ICD-10-CM

## 2019-05-25 LAB
ALBUMIN SERPL BCP-MCNC: 3.4 G/DL (ref 3.5–5)
ALP SERPL-CCNC: 74 U/L (ref 46–116)
ALT SERPL W P-5'-P-CCNC: 29 U/L (ref 12–78)
ANION GAP SERPL CALCULATED.3IONS-SCNC: 7 MMOL/L (ref 4–13)
AST SERPL W P-5'-P-CCNC: 21 U/L (ref 5–45)
BASOPHILS # BLD AUTO: 0.1 THOUSANDS/ΜL (ref 0–0.1)
BASOPHILS NFR BLD AUTO: 2 % (ref 0–1)
BILIRUB SERPL-MCNC: 0.9 MG/DL (ref 0.2–1)
BUN SERPL-MCNC: 22 MG/DL (ref 5–25)
CALCIUM SERPL-MCNC: 8.7 MG/DL (ref 8.3–10.1)
CHLORIDE SERPL-SCNC: 106 MMOL/L (ref 100–108)
CHOLEST SERPL-MCNC: 154 MG/DL (ref 50–200)
CO2 SERPL-SCNC: 28 MMOL/L (ref 21–32)
CREAT SERPL-MCNC: 1.08 MG/DL (ref 0.6–1.3)
EOSINOPHIL # BLD AUTO: 0.28 THOUSAND/ΜL (ref 0–0.61)
EOSINOPHIL NFR BLD AUTO: 4 % (ref 0–6)
ERYTHROCYTE [DISTWIDTH] IN BLOOD BY AUTOMATED COUNT: 14.6 % (ref 11.6–15.1)
EST. AVERAGE GLUCOSE BLD GHB EST-MCNC: 134 MG/DL
GFR SERPL CREATININE-BSD FRML MDRD: 71 ML/MIN/1.73SQ M
GLUCOSE P FAST SERPL-MCNC: 123 MG/DL (ref 65–99)
HBA1C MFR BLD: 6.3 % (ref 4.2–6.3)
HCT VFR BLD AUTO: 46 % (ref 36.5–49.3)
HDLC SERPL-MCNC: 38 MG/DL (ref 40–60)
HGB BLD-MCNC: 15.3 G/DL (ref 12–17)
IMM GRANULOCYTES # BLD AUTO: 0.02 THOUSAND/UL (ref 0–0.2)
IMM GRANULOCYTES NFR BLD AUTO: 0 % (ref 0–2)
LDLC SERPL CALC-MCNC: 87 MG/DL (ref 0–100)
LYMPHOCYTES # BLD AUTO: 1.81 THOUSANDS/ΜL (ref 0.6–4.47)
LYMPHOCYTES NFR BLD AUTO: 27 % (ref 14–44)
MCH RBC QN AUTO: 29.4 PG (ref 26.8–34.3)
MCHC RBC AUTO-ENTMCNC: 33.3 G/DL (ref 31.4–37.4)
MCV RBC AUTO: 89 FL (ref 82–98)
MONOCYTES # BLD AUTO: 0.56 THOUSAND/ΜL (ref 0.17–1.22)
MONOCYTES NFR BLD AUTO: 9 % (ref 4–12)
NEUTROPHILS # BLD AUTO: 3.84 THOUSANDS/ΜL (ref 1.85–7.62)
NEUTS SEG NFR BLD AUTO: 58 % (ref 43–75)
NONHDLC SERPL-MCNC: 116 MG/DL
NRBC BLD AUTO-RTO: 0 /100 WBCS
PLATELET # BLD AUTO: 286 THOUSANDS/UL (ref 149–390)
PMV BLD AUTO: 10.8 FL (ref 8.9–12.7)
POTASSIUM SERPL-SCNC: 4.6 MMOL/L (ref 3.5–5.3)
PROT SERPL-MCNC: 6.7 G/DL (ref 6.4–8.2)
RBC # BLD AUTO: 5.2 MILLION/UL (ref 3.88–5.62)
SODIUM SERPL-SCNC: 141 MMOL/L (ref 136–145)
TRIGL SERPL-MCNC: 145 MG/DL
WBC # BLD AUTO: 6.61 THOUSAND/UL (ref 4.31–10.16)

## 2019-05-25 PROCEDURE — 36415 COLL VENOUS BLD VENIPUNCTURE: CPT

## 2019-05-25 PROCEDURE — 83036 HEMOGLOBIN GLYCOSYLATED A1C: CPT

## 2019-05-25 PROCEDURE — 85025 COMPLETE CBC W/AUTO DIFF WBC: CPT

## 2019-05-25 PROCEDURE — 80053 COMPREHEN METABOLIC PANEL: CPT

## 2019-05-25 PROCEDURE — 80061 LIPID PANEL: CPT

## 2019-06-03 ENCOUNTER — HOSPITAL ENCOUNTER (OUTPATIENT)
Dept: RADIOLOGY | Facility: HOSPITAL | Age: 69
Discharge: HOME | End: 2019-06-03
Attending: INTERNAL MEDICINE
Payer: COMMERCIAL

## 2019-06-03 ENCOUNTER — OFFICE VISIT (OUTPATIENT)
Dept: INTERNAL MEDICINE CLINIC | Facility: CLINIC | Age: 69
End: 2019-06-03
Payer: COMMERCIAL

## 2019-06-03 VITALS
HEART RATE: 84 BPM | WEIGHT: 218 LBS | BODY MASS INDEX: 29.53 KG/M2 | DIASTOLIC BLOOD PRESSURE: 82 MMHG | SYSTOLIC BLOOD PRESSURE: 164 MMHG | RESPIRATION RATE: 16 BRPM | HEIGHT: 72 IN | OXYGEN SATURATION: 96 %

## 2019-06-03 DIAGNOSIS — R05.3 CHRONIC COUGH: ICD-10-CM

## 2019-06-03 DIAGNOSIS — M54.50 CHRONIC LEFT-SIDED LOW BACK PAIN WITHOUT SCIATICA: ICD-10-CM

## 2019-06-03 DIAGNOSIS — R73.01 IMPAIRED FASTING GLUCOSE: ICD-10-CM

## 2019-06-03 DIAGNOSIS — E78.2 MIXED HYPERLIPIDEMIA: ICD-10-CM

## 2019-06-03 DIAGNOSIS — G89.29 CHRONIC LEFT-SIDED LOW BACK PAIN WITHOUT SCIATICA: ICD-10-CM

## 2019-06-03 DIAGNOSIS — I10 BENIGN HYPERTENSION: Primary | ICD-10-CM

## 2019-06-03 DIAGNOSIS — I10 ESSENTIAL HYPERTENSION: ICD-10-CM

## 2019-06-03 PROCEDURE — 3008F BODY MASS INDEX DOCD: CPT | Performed by: INTERNAL MEDICINE

## 2019-06-03 PROCEDURE — 1160F RVW MEDS BY RX/DR IN RCRD: CPT | Performed by: INTERNAL MEDICINE

## 2019-06-03 PROCEDURE — 99214 OFFICE O/P EST MOD 30 MIN: CPT | Performed by: INTERNAL MEDICINE

## 2019-06-03 PROCEDURE — 1036F TOBACCO NON-USER: CPT | Performed by: INTERNAL MEDICINE

## 2019-06-03 PROCEDURE — 1101F PT FALLS ASSESS-DOCD LE1/YR: CPT | Performed by: INTERNAL MEDICINE

## 2019-06-03 PROCEDURE — 72100 X-RAY EXAM L-S SPINE 2/3 VWS: CPT

## 2019-06-03 PROCEDURE — 71046 X-RAY EXAM CHEST 2 VIEWS: CPT

## 2019-06-03 RX ORDER — ATENOLOL 50 MG/1
50 TABLET ORAL DAILY
Qty: 90 TABLET | Refills: 1 | Status: SHIPPED | OUTPATIENT
Start: 2019-06-03 | End: 2019-11-30 | Stop reason: SDUPTHER

## 2019-06-03 RX ORDER — OLMESARTAN MEDOXOMIL AND HYDROCHLOROTHIAZIDE 20/12.5 20; 12.5 MG/1; MG/1
1 TABLET ORAL DAILY
Qty: 90 TABLET | Refills: 1 | Status: SHIPPED | OUTPATIENT
Start: 2019-06-03 | End: 2019-11-13 | Stop reason: SDUPTHER

## 2019-07-08 ENCOUNTER — OFFICE VISIT (OUTPATIENT)
Dept: INTERNAL MEDICINE CLINIC | Facility: CLINIC | Age: 69
End: 2019-07-08
Payer: COMMERCIAL

## 2019-07-08 VITALS
SYSTOLIC BLOOD PRESSURE: 120 MMHG | HEIGHT: 72 IN | RESPIRATION RATE: 16 BRPM | OXYGEN SATURATION: 98 % | WEIGHT: 219 LBS | BODY MASS INDEX: 29.66 KG/M2 | HEART RATE: 86 BPM | DIASTOLIC BLOOD PRESSURE: 68 MMHG

## 2019-07-08 DIAGNOSIS — Z23 NEED FOR PNEUMOCOCCAL VACCINATION: ICD-10-CM

## 2019-07-08 DIAGNOSIS — I10 BENIGN HYPERTENSION: Primary | ICD-10-CM

## 2019-07-08 PROCEDURE — 1160F RVW MEDS BY RX/DR IN RCRD: CPT | Performed by: INTERNAL MEDICINE

## 2019-07-08 PROCEDURE — 1036F TOBACCO NON-USER: CPT | Performed by: INTERNAL MEDICINE

## 2019-07-08 PROCEDURE — 90471 IMMUNIZATION ADMIN: CPT | Performed by: SPECIALIST

## 2019-07-08 PROCEDURE — 90670 PCV13 VACCINE IM: CPT | Performed by: INTERNAL MEDICINE

## 2019-07-08 PROCEDURE — 3074F SYST BP LT 130 MM HG: CPT | Performed by: INTERNAL MEDICINE

## 2019-07-08 PROCEDURE — 99213 OFFICE O/P EST LOW 20 MIN: CPT | Performed by: INTERNAL MEDICINE

## 2019-07-08 PROCEDURE — 3008F BODY MASS INDEX DOCD: CPT | Performed by: INTERNAL MEDICINE

## 2019-07-08 NOTE — PROGRESS NOTES
Assessment/Plan:   Chronic problems are stable  Continue present medications  Continue diet and exercise  Ordered labs for next visit  BMI Counseling: Body mass index is 29 7 kg/m²  Discussed the patient's BMI with him  The BMI is above average  BMI counseling and education was provided to the patient  Nutrition recommendations include moderation in carbohydrate intake  Return in about 6 months (around 1/8/2020)  No problem-specific Assessment & Plan notes found for this encounter  Diagnoses and all orders for this visit:    Benign hypertension  -     CBC and differential; Future  -     Comprehensive metabolic panel; Future  -     Lipid panel; Future    Need for pneumococcal vaccination  -     PNEUMOCOCCAL CONJUGATE VACCINE 13-VALENT GREATER THAN 6 MONTHS          Subjective:      Patient ID: Dmitri Tobias is a 79 y o  male  Patient comes in today for follow-up of some pressure with the change in medicine  Pressure is doing very well  No swelling in the ankles  No side effects on the medicine he reports no new complaints today  No further additions to his history        ALLERGIES:  Allergies   Allergen Reactions    Amlodipine Swelling    Lisinopril      Other reaction(s): Cough  Other reaction(s): Cough       CURRENT MEDICATIONS:    Current Outpatient Medications:     aspirin 81 MG tablet, Take 81 mg by mouth daily, Disp: , Rfl:     atenolol (TENORMIN) 50 mg tablet, Take 1 tablet (50 mg total) by mouth daily, Disp: 90 tablet, Rfl: 1    Cholecalciferol (VITAMIN D3) 2000 UNITS capsule, Take by mouth, Disp: , Rfl:     esomeprazole (NexIUM) 20 mg capsule, Take 20 mg by mouth every morning before breakfast, Disp: , Rfl:     FLUoxetine (PROzac) 10 mg capsule, TAKE 1 CAPSULE DAILY, Disp: 90 capsule, Rfl: 1    naproxen (NAPROSYN) 250 mg tablet, Take 250 mg by mouth daily, Disp: , Rfl:     olmesartan-hydrochlorothiazide (BENICAR HCT) 20-12 5 MG per tablet, Take 1 tablet by mouth daily, Disp: 90 tablet, Rfl: 1    simvastatin (ZOCOR) 20 mg tablet, TAKE 1 TABLET DAILY, Disp: 90 tablet, Rfl: 0    docusate sodium (COLACE) 100 mg capsule, Take 1 capsule by mouth 2 (two) times a day for 30 days, Disp: 60 capsule, Rfl: 0    ACTIVE PROBLEM LIST:  Patient Active Problem List   Diagnosis    Complete tear of left rotator cuff    Biceps tendinitis    Benign hypertension    Allergic rhinitis    Hyperlipidemia    Depression    Impaired fasting glucose    Vitamin D deficiency       PAST MEDICAL HISTORY:  Past Medical History:   Diagnosis Date    Hyperlipidemia     Hypertension        PAST SURGICAL HISTORY:  Past Surgical History:   Procedure Laterality Date    ABDOMINAL SURGERY      KNEE ARTHROSCOPY      NASAL SINUS SURGERY      NV ARTHROSCOPY SHOULDER SURGICAL BICEPS TENODESIS Right 1/25/2017    Procedure: ARTHROSCOPIC BICEPS TENODESIS;  Surgeon: Elton Carpio MD;  [facility];  Service: Orthopedics    NV Nadeem Aguirre DEBRIDE Right 1/25/2017    Procedure: ARTHROSCOPY SHOULDER;  Surgeon: Elton Carpio MD;  [facility];  Service: Orthopedics    NV SHLDR ARTHROSCOP,SURG,W/ROTAT CUFF REPR Right 1/25/2017    Procedure: SHOULDER ARTHROSCOPY ROTATOR CUFF REPAIR ;  Surgeon: Elton Carpio MD;  [facility];  Service: Orthopedics    TONSILLECTOMY         FAMILY HISTORY:  Family History   Problem Relation Age of Onset    Cancer Father     Heart disease Father     Hyperlipidemia Father     Hypertension Father     Brain cancer Father     Migraines Mother        SOCIAL HISTORY:  Social History     Socioeconomic History    Marital status: /Civil Union     Spouse name: Not on file    Number of children: Not on file    Years of education: Not on file    Highest education level: Not on file   Occupational History    Occupation: Part time   Social Needs    Financial resource strain: Not on file    Food insecurity:     Worry: Not on file Inability: Not on file    Transportation needs:     Medical: Not on file     Non-medical: Not on file   Tobacco Use    Smoking status: Former Smoker    Smokeless tobacco: Never Used   Substance and Sexual Activity    Alcohol use: Yes     Comment: social    Drug use: No    Sexual activity: Not on file     Comment: Denied high risk sexual behavior   Lifestyle    Physical activity:     Days per week: Not on file     Minutes per session: Not on file    Stress: Not on file   Relationships    Social connections:     Talks on phone: Not on file     Gets together: Not on file     Attends Evangelical service: Not on file     Active member of club or organization: Not on file     Attends meetings of clubs or organizations: Not on file     Relationship status: Not on file    Intimate partner violence:     Fear of current or ex partner: Not on file     Emotionally abused: Not on file     Physically abused: Not on file     Forced sexual activity: Not on file   Other Topics Concern    Not on file   Social History Narrative    Not on file       Review of Systems   Respiratory: Negative for shortness of breath  Cardiovascular: Negative for chest pain  Gastrointestinal: Negative for abdominal pain  Objective:  Vitals:    07/08/19 1553   BP: 120/68   BP Location: Left arm   Patient Position: Sitting   Cuff Size: Standard   Pulse: 86   Resp: 16   SpO2: 98%   Weight: 99 3 kg (219 lb)   Height: 6' (1 829 m)     Body mass index is 29 7 kg/m²  Physical Exam   Constitutional: He is oriented to person, place, and time  He appears well-developed and well-nourished  Cardiovascular: Normal rate, regular rhythm and normal heart sounds  Pulmonary/Chest: Effort normal and breath sounds normal    Abdominal: Soft  There is no tenderness  Musculoskeletal: He exhibits no edema  Neurological: He is alert and oriented to person, place, and time  Nursing note and vitals reviewed          RESULTS:    No results found for this or any previous visit (from the past 1008 hour(s))  This note was created with voice recognition software  Phonic, grammatical and spelling errors may be present within the note as a result

## 2019-07-28 DIAGNOSIS — E78.5 HYPERLIPIDEMIA, UNSPECIFIED HYPERLIPIDEMIA TYPE: ICD-10-CM

## 2019-07-29 RX ORDER — SIMVASTATIN 20 MG
TABLET ORAL
Qty: 90 TABLET | Refills: 0 | Status: SHIPPED | OUTPATIENT
Start: 2019-07-29 | End: 2019-10-26 | Stop reason: SDUPTHER

## 2019-09-15 ENCOUNTER — HOSPITAL ENCOUNTER (EMERGENCY)
Facility: HOSPITAL | Age: 69
Discharge: HOME | End: 2019-09-15
Attending: EMERGENCY MEDICINE
Payer: COMMERCIAL

## 2019-09-15 VITALS
BODY MASS INDEX: 30.16 KG/M2 | RESPIRATION RATE: 20 BRPM | WEIGHT: 222.66 LBS | DIASTOLIC BLOOD PRESSURE: 72 MMHG | TEMPERATURE: 97.9 F | SYSTOLIC BLOOD PRESSURE: 167 MMHG | HEIGHT: 72 IN | HEART RATE: 63 BPM | OXYGEN SATURATION: 97 %

## 2019-09-15 DIAGNOSIS — L03.012 PARONYCHIA OF FINGER OF LEFT HAND: Primary | ICD-10-CM

## 2019-09-15 PROCEDURE — 99283 EMERGENCY DEPT VISIT LOW MDM: CPT

## 2019-09-15 PROCEDURE — 99284 EMERGENCY DEPT VISIT MOD MDM: CPT | Performed by: NURSE PRACTITIONER

## 2019-09-15 PROCEDURE — 10060 I&D ABSCESS SIMPLE/SINGLE: CPT | Performed by: NURSE PRACTITIONER

## 2019-09-15 RX ORDER — AMOXICILLIN AND CLAVULANATE POTASSIUM 875; 125 MG/1; MG/1
1 TABLET, FILM COATED ORAL 2 TIMES DAILY
Qty: 20 TABLET | Refills: 0 | Status: SHIPPED | OUTPATIENT
Start: 2019-09-15 | End: 2019-09-25

## 2019-09-15 NOTE — ED PROVIDER NOTES
History  Chief Complaint   Patient presents with    Finger Pain     left index finger pain starting yesterday  denies injury  finger appears swollen in triage  31-year-old male patient presents here with a chief complaint of fingernail infection  He has swelling and tenderness of the left index finger which started yesterday  Consistent with a paronychia  He has a nail biter by history  Prior to Admission Medications   Prescriptions Last Dose Informant Patient Reported? Taking?    Cholecalciferol (VITAMIN D3) 2000 UNITS capsule   Yes No   Sig: Take by mouth   FLUoxetine (PROzac) 10 mg capsule   No No   Sig: TAKE 1 CAPSULE DAILY   aspirin 81 MG tablet  Self Yes No   Sig: Take 81 mg by mouth daily   atenolol (TENORMIN) 50 mg tablet   No No   Sig: Take 1 tablet (50 mg total) by mouth daily   docusate sodium (COLACE) 100 mg capsule   No No   Sig: Take 1 capsule by mouth 2 (two) times a day for 30 days   esomeprazole (NexIUM) 20 mg capsule  Self Yes No   Sig: Take 20 mg by mouth every morning before breakfast   naproxen (NAPROSYN) 250 mg tablet  Self Yes No   Sig: Take 250 mg by mouth daily   olmesartan-hydrochlorothiazide (BENICAR HCT) 20-12 5 MG per tablet   No No   Sig: Take 1 tablet by mouth daily   simvastatin (ZOCOR) 20 mg tablet   No No   Sig: TAKE 1 TABLET DAILY      Facility-Administered Medications: None       Past Medical History:   Diagnosis Date    Hyperlipidemia     Hypertension        Past Surgical History:   Procedure Laterality Date    ABDOMINAL SURGERY      KNEE ARTHROSCOPY      NASAL SINUS SURGERY      [place] ARTHROSCOPY SHOULDER SURGICAL BICEPS TENODESIS Right 1/25/2017    Procedure: ARTHROSCOPIC BICEPS TENODESIS;  Surgeon: Blaise Messina MD;  [facility];  Service: Orthopedics    [place] SHLDR ARTHROSCOP,EXTEN DEBRIDE Right 1/25/2017    Procedure: ARTHROSCOPY SHOULDER;  Surgeon: Blaise Messina MD;  [facility];  Service: Orthopedics    [place] [facility] ARTHROSCOP,SURG,W/ROTAT CUFF REPR Right 1/25/2017    Procedure: SHOULDER ARTHROSCOPY ROTATOR CUFF REPAIR ;  Surgeon: Osmar Stubbs MD;  [facility];  Service: Orthopedics    TONSILLECTOMY         Family History   Problem Relation Age of Onset    Cancer Father     Heart disease Father     Hyperlipidemia Father     Hypertension Father     Brain cancer Father     Migraines Mother      I have reviewed and agree with the history as documented  Social History     Tobacco Use    Smoking status: Former Smoker    Smokeless tobacco: Never Used   Substance Use Topics    Alcohol use: Yes     Comment: social    Drug use: No        Review of Systems   Constitutional: Negative for diaphoresis, fatigue and fever  HENT: Negative for congestion, ear pain, nosebleeds and sore throat  Eyes: Negative for photophobia, pain, discharge and visual disturbance  Respiratory: Negative for cough, choking, chest tightness, shortness of breath and wheezing  Cardiovascular: Negative for chest pain and palpitations  Gastrointestinal: Negative for abdominal distention, abdominal pain, diarrhea and vomiting  Genitourinary: Negative for dysuria, flank pain and frequency  Musculoskeletal: Negative for back pain, gait problem and joint swelling  Skin: Positive for wound  Negative for color change and rash  Neurological: Negative for dizziness, syncope and headaches  Psychiatric/Behavioral: Negative for behavioral problems and confusion  The patient is not nervous/anxious  All other systems reviewed and are negative  Physical Exam  Physical Exam   Constitutional: He is oriented to person, place, and time  He appears well-developed and well-nourished  HENT:   Head: Normocephalic and atraumatic  Eyes: Pupils are equal, round, and reactive to light  Neck: Normal range of motion  Neck supple  Cardiovascular: Normal rate, regular rhythm, normal heart sounds and normal pulses   PMI is not displaced  Pulmonary/Chest: Effort normal and breath sounds normal  No respiratory distress  Abdominal: Soft  He exhibits no distension  There is no guarding  Musculoskeletal: Normal range of motion  Lymphadenopathy:     He has no cervical adenopathy  Neurological: He is alert and oriented to person, place, and time  Skin: Skin is warm and dry  No rash noted  He is not diaphoretic  No pallor  Paronychia of left index finger  Psychiatric: He has a normal mood and affect  Vitals reviewed        Vital Signs  ED Triage Vitals [09/15/19 1118]   Temperature Pulse Respirations Blood Pressure SpO2   97 9 °F (36 6 °C) 63 20 167/72 97 %      Temp Source Heart Rate Source Patient Position - Orthostatic VS BP Location FiO2 (%)   Oral Monitor Sitting Left arm --      Pain Score       4           Vitals:    09/15/19 1118   BP: 167/72   Pulse: 63   Patient Position - Orthostatic VS: Sitting         Visual Acuity      ED Medications  Medications - No data to display    Diagnostic Studies  Results Reviewed     None                 No orders to display              Procedures  Incision and drain  Date/Time: 9/15/2019 1:11 PM  Performed by: LORI Sampson  Authorized by: LORI Sampson     Patient location:  ED  Other Assisting Provider: No    Consent:     Consent obtained:  Verbal and emergent situation    Consent given by:  Patient    Risks discussed:  Bleeding, incomplete drainage and pain    Alternatives discussed:  No treatment  Universal protocol:     Procedure explained and questions answered to patient or proxy's satisfaction: yes      Site/side marked: yes      Immediately prior to procedure a time out was called: yes      Patient identity confirmed:  Verbally with patient and arm band  Location:     Type:  Abscess    Location:  Upper extremity    Upper extremity location:  L index finger  Pre-procedure details:     Skin preparation:  Betadine  Procedure details:     Complexity:  Simple    Incision types: Single straight    Scalpel blade:  11    Approach:  Open    Wound management:  Probed and deloculated    Drainage:  Purulent    Drainage amount: Moderate  Post-procedure details:     Patient tolerance of procedure: Tolerated well, no immediate complications           ED Course                               MDM  Number of Diagnoses or Management Options  Paronychia of finger of left hand: new and requires workup     Amount and/or Complexity of Data Reviewed  Independent visualization of images, tracings, or specimens: yes    Patient Progress  Patient progress: stable      Disposition  Final diagnoses:   Paronychia of finger of left hand     Time reflects when diagnosis was documented in both MDM as applicable and the Disposition within this note     Time User Action Codes Description Comment    9/15/2019 12:46 PM Lucita Wright Add [F68 [id number]] Paronychia of finger of left hand       ED Disposition     ED Disposition Condition Date/Time Comment    Discharge Stable Sun Sep 15, 2019 12:46 PM Nancy Hall discharge to home/self care  Follow-up Information     Follow up With Specialties Details Why Contact Info    Adore Davidson MD Internal Medicine Schedule an appointment as soon as possible for a visit  For [address] [place] [address],[facility] Floor  [phone]            Patient's Medications   Discharge Prescriptions    AMOXICILLIN-CLAVULANATE (AUGMENTIN) 875-125 MG PER TABLET    Take 1 tablet by mouth 2 (two) times a day for 10 days       Start Date: 9/15/2019 End Date: 9/25/2019       Order Dose: 1 tablet       Quantity: 20 tablet    Refills: 0     No discharge procedures on file      ED Provider  Electronically Signed by           LORI Perez  09/15/19 [address]  09/15/19 1312

## 2019-09-17 ENCOUNTER — VBI (OUTPATIENT)
Dept: ADMINISTRATIVE | Facility: OTHER | Age: 69
End: 2019-09-17

## 2019-09-17 NOTE — TELEPHONE ENCOUNTER
Bety Cid    ED Visit Information     Ed visit date: 9/15/19  Diagnosis Description: Paronychia of finger of left hand  In Network? Yes [place]  Discharge status: Home  Discharged with meds ? Yes  Number of ED visits to date: 1  ED Severity:4     Outreach Information    Outreach successful: No 2   letter mailed:yes  Date Männi 12 Coordination    Follow up appointment with pcp: no 0  Transportation issues ?  NA    Value Base Outreach    Outreach type:  3 Day Outreach  Emergent necessity warranted by diagnosis:  No  [facility] PCP:  Yes  Practice Contacted Patient ?:  No  Pt had ED follow up with pcp/staff ?:  No    09/17/2019 02:10 PM Phone (Aundrea Abel) Merced Grady (Self) [phone] (H)   Left Message - att x1 lmom    09/18/2019 10:02 AM Phone (Aundrea Abel) Merced Grady (Self) [phone] (H) Remove  Left Message - att x2 lmom

## 2019-09-30 DIAGNOSIS — F32.89 OTHER DEPRESSION: ICD-10-CM

## 2019-09-30 RX ORDER — FLUOXETINE 10 MG/1
CAPSULE ORAL
Qty: 90 CAPSULE | Refills: 1 | Status: SHIPPED | OUTPATIENT
Start: 2019-09-30 | End: 2020-03-29

## 2019-10-26 DIAGNOSIS — E78.5 HYPERLIPIDEMIA, UNSPECIFIED HYPERLIPIDEMIA TYPE: ICD-10-CM

## 2019-10-28 RX ORDER — SIMVASTATIN 20 MG
TABLET ORAL
Qty: 90 TABLET | Refills: 2 | Status: SHIPPED | OUTPATIENT
Start: 2019-10-28 | End: 2020-07-23

## 2019-11-13 DIAGNOSIS — I10 BENIGN HYPERTENSION: ICD-10-CM

## 2019-11-13 RX ORDER — OLMESARTAN MEDOXOMIL AND HYDROCHLOROTHIAZIDE 20/12.5 20; 12.5 MG/1; MG/1
TABLET ORAL
Qty: 90 TABLET | Refills: 4 | Status: SHIPPED | OUTPATIENT
Start: 2019-11-13 | End: 2021-02-05

## 2019-11-30 DIAGNOSIS — I10 ESSENTIAL HYPERTENSION: ICD-10-CM

## 2019-12-02 RX ORDER — ATENOLOL 50 MG/1
TABLET ORAL
Qty: 90 TABLET | Refills: 4 | Status: SHIPPED | OUTPATIENT
Start: 2019-12-02 | End: 2021-02-23

## 2020-03-24 ENCOUNTER — TELEPHONE (OUTPATIENT)
Dept: INTERNAL MEDICINE CLINIC | Facility: CLINIC | Age: 70
End: 2020-03-24

## 2020-03-24 NOTE — TELEPHONE ENCOUNTER
Patient wanted your opinion on if he is at high risk working at [employer] due to his age? Patient said that if you believe that he is he would need a note stating this  Please advise        Contact # [phone]

## 2020-03-28 DIAGNOSIS — F32.89 OTHER DEPRESSION: ICD-10-CM

## 2020-03-29 RX ORDER — FLUOXETINE 10 MG/1
CAPSULE ORAL
Qty: 90 CAPSULE | Refills: 1 | Status: SHIPPED | OUTPATIENT
Start: 2020-03-29 | End: 2020-09-25

## 2020-07-09 ENCOUNTER — TELEPHONE (OUTPATIENT)
Dept: FAMILY MEDICINE CLINIC | Facility: CLINIC | Age: 70
End: 2020-07-09

## 2020-07-09 NOTE — TELEPHONE ENCOUNTER
He is going to be a new patient with you  His wife and daughter are patient's of yours as well  He has leg swelling and wanted to be seen as soon as possible  The soonest you had was 08/07/2020, so I scheduled him for that day  I did let him know I could get him in sooner with a different provider, but he preferred you  I'm not sure if you would want me to put him in somewhere in a same day shorter apt, or just tell him he would need to see someone else for a sooner apt

## 2020-07-19 ENCOUNTER — HOSPITAL ENCOUNTER (EMERGENCY)
Facility: HOSPITAL | Age: 70
Discharge: HOME | End: 2020-07-19
Attending: EMERGENCY MEDICINE | Admitting: EMERGENCY MEDICINE
Payer: COMMERCIAL

## 2020-07-19 VITALS
OXYGEN SATURATION: 94 % | RESPIRATION RATE: 16 BRPM | DIASTOLIC BLOOD PRESSURE: 60 MMHG | TEMPERATURE: 98 F | SYSTOLIC BLOOD PRESSURE: 143 MMHG | HEART RATE: 78 BPM

## 2020-07-19 DIAGNOSIS — S61.211A LACERATION OF LEFT INDEX FINGER: Primary | ICD-10-CM

## 2020-07-19 PROCEDURE — 90715 TDAP VACCINE 7 YRS/> IM: CPT | Performed by: PHYSICIAN ASSISTANT

## 2020-07-19 PROCEDURE — 99282 EMERGENCY DEPT VISIT SF MDM: CPT

## 2020-07-19 PROCEDURE — 90471 IMMUNIZATION ADMIN: CPT

## 2020-07-19 PROCEDURE — 99284 EMERGENCY DEPT VISIT MOD MDM: CPT | Performed by: PHYSICIAN ASSISTANT

## 2020-07-19 RX ADMIN — TETANUS TOXOID, REDUCED DIPHTHERIA TOXOID AND ACELLULAR PERTUSSIS VACCINE, ADSORBED 0.5 ML: 5; 2.5; 8; 8; 2.5 SUSPENSION INTRAMUSCULAR at 17:59

## 2020-07-19 NOTE — ED PROVIDER NOTES
History  Chief Complaint   Patient presents with    Finger Laceration     pt states to have cut finger while using power saw  pt states bleeding has subsided  uknown if UTD on tetanus     51-year-old male with history of hypertension hyperlipidemia presenting for evaluation a left index finger laceration sustained hour ago  Patient was cutting wood with a power saw when the wood slipped and he cut his index finger on the saw  Bleeding is controlled on arrival  No other injuries were sustained  No numbness or tingling  Unknown last Tdap  History provided by:  Patient   used: No    Laceration   Location:  Hand  Hand laceration location:  L fingers  Length:  1cm  Depth:  Cutaneous  Bleeding: controlled    Time since incident:  1 hour  Injury mechanism: Saw  Pain details:     Quality:  Aching    Severity:  Mild    Timing:  Constant    Progression:  Unchanged  Foreign body present:  No foreign bodies  Relieved by:  Nothing  Worsened by:  Nothing  Ineffective treatments:  None tried  Tetanus status:  Unknown  Associated symptoms: no fever, no focal weakness, no numbness, no rash, no redness, no swelling and no streaking        Prior to Admission Medications   Prescriptions Last Dose Informant Patient Reported? Taking?    Cholecalciferol (VITAMIN D3) 2000 UNITS capsule   Yes No   Sig: Take by mouth   FLUoxetine (PROzac) 10 mg capsule   No No   Sig: TAKE 1 CAPSULE DAILY   aspirin 81 MG tablet  Self Yes No   Sig: Take 81 mg by mouth daily   atenolol (TENORMIN) 50 mg tablet   No No   Sig: TAKE 1 TABLET DAILY   docusate sodium (COLACE) 100 mg capsule   No No   Sig: Take 1 capsule by mouth 2 (two) times a day for 30 days   esomeprazole (NexIUM) 20 mg capsule  Self Yes No   Sig: Take 20 mg by mouth every morning before breakfast   naproxen (NAPROSYN) 250 mg tablet  Self Yes No   Sig: Take 250 mg by mouth daily   olmesartan-hydrochlorothiazide (BENICAR HCT) 20-12 5 MG per tablet   No No   Sig: TAKE 1 TABLET DAILY   simvastatin (ZOCOR) 20 mg tablet   No No   Sig: TAKE 1 TABLET DAILY      Facility-Administered Medications: None       Past Medical History:   Diagnosis Date    Hyperlipidemia     Hypertension        Past Surgical History:   Procedure Laterality Date    ABDOMINAL SURGERY      KNEE ARTHROSCOPY      NASAL SINUS SURGERY      [place] ARTHROSCOPY SHOULDER SURGICAL BICEPS TENODESIS Right 1/25/2017    Procedure: ARTHROSCOPIC BICEPS TENODESIS;  Surgeon: Minerva Davidson MD;  [facility];  Service: Orthopedics    [place] Dariela Quinn DEBRIDE Right 1/25/2017    Procedure: ARTHROSCOPY SHOULDER;  Surgeon: Minerva Davidson MD;  [facility];  Service: Orthopedics    [place] SHLDR ARTHROSCOP,SURG,W/ROTAT CUFF REPR Right 1/25/2017    Procedure: SHOULDER ARTHROSCOPY ROTATOR CUFF REPAIR ;  Surgeon: Minerva Davidson MD;  [facility];  Service: Orthopedics    TONSILLECTOMY         Family History   Problem Relation Age of Onset    Cancer Father     Heart disease Father     Hyperlipidemia Father     Hypertension Father     Brain cancer Father     Migraines Mother      I have reviewed and agree with the history as documented  E-Cigarette/Vaping    E-Cigarette Use Never User      E-Cigarette/Vaping Substances     Social History     Tobacco Use    Smoking status: Former Smoker    Smokeless tobacco: Never Used   Substance Use Topics    Alcohol use: Yes     Comment: social    Drug use: No       Review of Systems   Constitutional: Negative for chills and fever  Eyes: Negative for discharge and redness  Respiratory: Negative for shortness of breath and stridor  Cardiovascular: Negative for chest pain and palpitations  Musculoskeletal: Negative for arthralgias, back pain and neck pain  Skin: Positive for wound  Negative for rash  Allergic/Immunologic: Negative for immunocompromised state  Neurological: Negative for focal weakness, weakness and numbness  Psychiatric/Behavioral: Negative for confusion  All other systems reviewed and are negative  Physical Exam  Physical Exam   Constitutional: He appears well-developed and well-nourished  No distress  Non-toxic appearing   HENT:   Head: Normocephalic and atraumatic  Right Ear: External ear normal    Left Ear: External ear normal    Mouth/Throat: Oropharynx is clear and moist    Eyes: Conjunctivae are normal  Right eye exhibits no discharge  Left eye exhibits no discharge  No scleral icterus  Neck: Normal range of motion  Neck supple  Cardiovascular: Normal rate, regular rhythm and normal heart sounds  No murmur heard  Pulses:       Radial pulses are 2+ on the right side, and 2+ on the left side  Pulmonary/Chest: Effort normal and breath sounds normal  No stridor  No respiratory distress  He has no wheezes  He has no rales  Abdominal: Soft  Bowel sounds are normal  He exhibits no distension  There is no tenderness  There is no guarding  Musculoskeletal: Normal range of motion  He exhibits no deformity  Full ROM of DIP, PIP, and MCP joint at left index finger  Cap refill <2 seconds  Distal sensation intact  Neurological: He is alert  He is not disoriented  GCS eye subscore is 4  GCS verbal subscore is 5  GCS motor subscore is 6  Skin: Skin is warm and dry  He is not diaphoretic    1cm x 1cm area where epidermal layer of skin was removed by saw on left index finger at PIP joint  Area unable to be approximated  No foreign bodies  Psychiatric: He has a normal mood and affect  His behavior is normal    Nursing note and vitals reviewed        Vital Signs  ED Triage Vitals [07/19/20 1732]   Temperature Pulse Respirations Blood Pressure SpO2   98 °F (36 7 °C) 78 16 143/60 94 %      Temp Source Heart Rate Source Patient Position - Orthostatic VS BP Location FiO2 (%)   Oral Monitor Sitting Left arm --      Pain Score       --           Vitals:    07/19/20 1732   BP: 143/60   Pulse: 78   Patient Position - Orthostatic VS: Sitting         Visual Acuity      ED Medications  Medications   tetanus-diphtheria-acellular pertussis (BOOSTRIX) IM injection 0 5 mL (0 5 mL Intramuscular Given 7/19/20 1759)       Diagnostic Studies  Results Reviewed     None                 No orders to display              Procedures  Procedures         ED Course       US AUDIT      Most Recent Value   Initial Alcohol Screen: US AUDIT-C    1  How often do you have a drink containing alcohol? 1 Filed at: 07/19/2020 1732   2  How many drinks containing alcohol do you have on a typical day you are drinking? 0 Filed at: 07/19/2020 1732   3a  Male UNDER 65: How often do you have five or more drinks on one occasion? 0 Filed at: 07/19/2020 1732   3b  FEMALE Any Age, or MALE 65+: How often do you have 4 or more drinks on one occassion? 0 Filed at: 07/19/2020 1732   Audit-C Score  1 Filed at: 07/19/2020 1732              Identification of Seniors at Risk      Most Recent Value   (ISAR) Identification of Seniors at Risk   Before the illness or injury that brought you to the Emergency, did you need someone to help you on a regular basis? 0 Filed at: 07/19/2020 1733   In the last 24 hours, have you needed more help than usual?  0 Filed at: 07/19/2020 1733   Have you been hospitalized for one or more nights during the past 6 months? 0 Filed at: 07/19/2020 1733   In general, do you see well? 1 Filed at: 07/19/2020 1733   In general, do you have serious problems with your memory? 0 Filed at: 07/19/2020 1733   Do you take more than three different medications every day? 1 Filed at: 07/19/2020 1733   ISAR Score  2 Filed at: 07/19/2020 1733          IKE/DAST-10      Most Recent Value   How many times in the past year have you    Used an illegal drug or used a prescription medication for non-medical reasons?   Never Filed at: 07/19/2020 1733                                MDM  Number of Diagnoses or Management Options  Laceration of left index finger: new and does not require workup  Diagnosis management comments: 72yo male presenting for a laceration to his left index finger after cutting himself with a band saw  Bleeding controlled on arrival  On exam, there is a 1cm x 1cm area where the epidermal layer was removed  Not able to be approximated and not amenable to suture repair  No underlying bony tenderness and ROM intact  No indication for imaging at this time  Wound cleaned with saline and Betadine and dressed with gauze roll  Tdap updated  Home wound care discussed  ED return precautions discussed including fevers and signs of infection  Patient expressed understanding and is agreeable to plan  Patient discharged in stable condition  Amount and/or Complexity of Data Reviewed  Review and summarize past medical records: yes    Risk of Complications, Morbidity, and/or Mortality  Presenting problems: low  Diagnostic procedures: low  Management options: low    Patient Progress  Patient progress: stable        Disposition  Final diagnoses:   Laceration of left index finger     Time reflects when diagnosis was documented in both MDM as applicable and the Disposition within this note     Time User Action Codes Description Comment    7/19/2020  5:47 PM [address] [place], [place] [address]y, [id number]  Laceration of left index finger       ED Disposition     ED Disposition Condition Date/Time Comment    Discharge Stable Sun Jul 19, 2020  5:47 PM Fernie Hale discharge to home/self care              Follow-up Information     Follow up With Specialties Details Why Contact Info Additional Information    [address] [facility] Emergency Department Emergency Medicine  As needed [address] [facility] [address]  [address] [place] [address] ED, [address], [place], [place], [address]          Discharge Medication List as of 7/19/2020  5:48 PM      CONTINUE these medications which have NOT CHANGED    Details   aspirin 81 MG tablet Take 81 mg by mouth daily, Until Discontinued, Historical Med      atenolol (TENORMIN) 50 mg tablet TAKE 1 TABLET DAILY, Normal      Cholecalciferol (VITAMIN D3) 2000 UNITS capsule Take by mouth, Starting 6/6/2016, Until Discontinued, Historical Med      docusate sodium (COLACE) 100 mg capsule Take 1 capsule by mouth 2 (two) times a day for 30 days, Starting Wed 1/25/2017, Until Sun 7/16/2017, Print      esomeprazole (NexIUM) 20 mg capsule Take 20 mg by mouth every morning before breakfast, Until Discontinued, Historical Med      FLUoxetine (PROzac) 10 mg capsule TAKE 1 CAPSULE DAILY, Normal      naproxen (NAPROSYN) 250 mg tablet Take 250 mg by mouth daily, Until Discontinued, Historical Med      olmesartan-hydrochlorothiazide (BENICAR HCT) 20-12 5 MG per tablet TAKE 1 TABLET DAILY, Normal      simvastatin (ZOCOR) 20 mg tablet TAKE 1 TABLET DAILY, Normal           No discharge procedures on file      PDMP Review     None          ED Provider  Electronically Signed by           Oumou Montalvo PA-C  07/19/20 9798

## 2020-07-19 NOTE — DISCHARGE INSTRUCTIONS
Apply neosporin 2x daily and keep wound covered until it starts healing  Take Tylenol as needed for pain  Return to ER with any concern for infection (warmth, drainage, fevers)

## 2020-07-23 DIAGNOSIS — E78.5 HYPERLIPIDEMIA, UNSPECIFIED HYPERLIPIDEMIA TYPE: ICD-10-CM

## 2020-07-23 RX ORDER — SIMVASTATIN 20 MG
TABLET ORAL
Qty: 90 TABLET | Refills: 1 | Status: SHIPPED | OUTPATIENT
Start: 2020-07-23 | End: 2021-06-13

## 2020-07-28 ENCOUNTER — OFFICE VISIT (OUTPATIENT)
Dept: INTERNAL MEDICINE CLINIC | Facility: CLINIC | Age: 70
End: 2020-07-28
Payer: COMMERCIAL

## 2020-07-28 VITALS
DIASTOLIC BLOOD PRESSURE: 68 MMHG | BODY MASS INDEX: 30.2 KG/M2 | WEIGHT: 223 LBS | SYSTOLIC BLOOD PRESSURE: 110 MMHG | OXYGEN SATURATION: 98 % | HEART RATE: 67 BPM | TEMPERATURE: 98 F | RESPIRATION RATE: 16 BRPM | HEIGHT: 72 IN

## 2020-07-28 DIAGNOSIS — E55.9 VITAMIN D DEFICIENCY: ICD-10-CM

## 2020-07-28 DIAGNOSIS — M25.561 CHRONIC PAIN OF RIGHT KNEE: ICD-10-CM

## 2020-07-28 DIAGNOSIS — F32.A DEPRESSION, UNSPECIFIED DEPRESSION TYPE: ICD-10-CM

## 2020-07-28 DIAGNOSIS — Z23 NEED FOR PNEUMOCOCCAL VACCINATION: ICD-10-CM

## 2020-07-28 DIAGNOSIS — M79.10 MYALGIA: ICD-10-CM

## 2020-07-28 DIAGNOSIS — G89.29 CHRONIC PAIN OF RIGHT KNEE: ICD-10-CM

## 2020-07-28 DIAGNOSIS — I10 BENIGN HYPERTENSION: Primary | ICD-10-CM

## 2020-07-28 DIAGNOSIS — E78.2 MIXED HYPERLIPIDEMIA: ICD-10-CM

## 2020-07-28 PROCEDURE — 1160F RVW MEDS BY RX/DR IN RCRD: CPT | Performed by: INTERNAL MEDICINE

## 2020-07-28 PROCEDURE — 4040F PNEUMOC VAC/ADMIN/RCVD: CPT | Performed by: INTERNAL MEDICINE

## 2020-07-28 PROCEDURE — 99214 OFFICE O/P EST MOD 30 MIN: CPT | Performed by: INTERNAL MEDICINE

## 2020-07-28 PROCEDURE — 90471 IMMUNIZATION ADMIN: CPT | Performed by: INTERNAL MEDICINE

## 2020-07-28 PROCEDURE — 3074F SYST BP LT 130 MM HG: CPT | Performed by: INTERNAL MEDICINE

## 2020-07-28 PROCEDURE — 1101F PT FALLS ASSESS-DOCD LE1/YR: CPT | Performed by: INTERNAL MEDICINE

## 2020-07-28 PROCEDURE — 3008F BODY MASS INDEX DOCD: CPT | Performed by: INTERNAL MEDICINE

## 2020-07-28 PROCEDURE — 3078F DIAST BP <80 MM HG: CPT | Performed by: INTERNAL MEDICINE

## 2020-07-28 PROCEDURE — 90732 PPSV23 VACC 2 YRS+ SUBQ/IM: CPT | Performed by: INTERNAL MEDICINE

## 2020-07-28 PROCEDURE — 1036F TOBACCO NON-USER: CPT | Performed by: INTERNAL MEDICINE

## 2020-07-28 PROCEDURE — 3288F FALL RISK ASSESSMENT DOCD: CPT | Performed by: INTERNAL MEDICINE

## 2020-07-28 NOTE — PROGRESS NOTES
Assessment/Plan:     Chronic problems appear stable  We both agreed it is about time to get his joints looked at  Main problem is the knee so we will start with that  For the other muscle aches, will check a CRP with his other blood work but also suggested he try stopping his simvastatin for 2 weeks  Quality Measures:       Return in about 6 months (around 1/28/2021)  No problem-specific Assessment & Plan notes found for this encounter  Diagnoses and all orders for this visit:    Benign hypertension  -     CBC and differential; Future  -     Comprehensive metabolic panel; Future  -     T4, free; Future  -     TSH, 3rd generation; Future    Mixed hyperlipidemia  -     Lipid panel; Future    Depression, unspecified depression type    Need for pneumococcal vaccination  -     PNEUMOCOCCAL POLYSACCHARIDE VACCINE 23-VALENT =>3YO SQ IM    Vitamin D deficiency  -     Vitamin D 25 hydroxy; Future    Chronic pain of right knee  -     Ambulatory referral to Orthopedic Surgery; Future    Myalgia  -     C-reactive protein; Future          Subjective:      Patient ID: Milderd Leyda is a 81 y o  male  Patient comes in today for follow-up  He has been working  It has been stressful but he is doing okay  His family is doing okay during the pandemic  Blood pressure is controlled  Cholesterol has been controlled but he is overdue for blood work  He feels more tired than usual which he thinks may be from work but he is not sure if depression is coming into play  However, he does not want to adjust his fluoxetine dose at this point  Getting more problems with his joints  His ankle still stay swollen at the end of the day  His right knee is bothering him more  Sometimes the hip  Sometimes his back        ALLERGIES:  Allergies   Allergen Reactions    Amlodipine Swelling    Lisinopril      Other reaction(s): Cough  Other reaction(s): Cough       CURRENT MEDICATIONS:    Current Outpatient Medications:    aspirin 81 MG tablet, Take 81 mg by mouth daily, Disp: , Rfl:     atenolol (TENORMIN) 50 mg tablet, TAKE 1 TABLET DAILY, Disp: 90 tablet, Rfl: 4    Cholecalciferol (VITAMIN D3) 2000 UNITS capsule, Take by mouth, Disp: , Rfl:     esomeprazole (NexIUM) 20 mg capsule, Take 20 mg by mouth every morning before breakfast, Disp: , Rfl:     FLUoxetine (PROzac) 10 mg capsule, TAKE 1 CAPSULE DAILY, Disp: 90 capsule, Rfl: 1    olmesartan-hydrochlorothiazide (BENICAR HCT) 20-12 5 MG per tablet, TAKE 1 TABLET DAILY, Disp: 90 tablet, Rfl: 4    simvastatin (ZOCOR) 20 mg tablet, TAKE 1 TABLET DAILY, Disp: 90 tablet, Rfl: 1    ACTIVE PROBLEM LIST:  Patient Active Problem List   Diagnosis    Complete tear of left rotator cuff    Biceps tendinitis    Benign hypertension    Allergic rhinitis    Hyperlipidemia    Depression    Impaired fasting glucose    Vitamin D deficiency       PAST MEDICAL HISTORY:  Past Medical History:   Diagnosis Date    Hyperlipidemia     Hypertension        PAST SURGICAL HISTORY:  Past Surgical History:   Procedure Laterality Date    ABDOMINAL SURGERY      KNEE ARTHROSCOPY      NASAL SINUS SURGERY      HI ARTHROSCOPY SHOULDER SURGICAL BICEPS TENODESIS Right 1/25/2017    Procedure: ARTHROSCOPIC BICEPS TENODESIS;  Surgeon: Herminio Aguiar MD;  [facility];  Service: Orthopedics    HI LONAR ARTHROSCOP,EXTEN DEBRIDE Right 1/25/2017    Procedure: ARTHROSCOPY SHOULDER;  Surgeon: Herminio Aguiar MD;  [facility];  Service: Orthopedics    [facility]    Procedure: SHOULDER ARTHROSCOPY ROTATOR CUFF REPAIR ;  Surgeon: Herminio Aguiar MD;  [facility];  Service: Orthopedics    TONSILLECTOMY         FAMILY HISTORY:  Family History   Problem Relation Age of Onset    Cancer Father     Heart disease Father     Hyperlipidemia Father     Hypertension Father     Brain cancer Father     Migraines Mother        SOCIAL HISTORY:  Social History     Socioeconomic History    Marital status: /Civil Union     Spouse name: Not on file    Number of children: Not on file    Years of education: Not on file    Highest education level: Not on file   Occupational History    Occupation: Part time   Social Needs    Financial resource strain: Not on file    Food insecurity:     Worry: Not on file     Inability: Not on file   [employer] needs:     Medical: Not on file     Non-medical: Not on file   Tobacco Use    Smoking status: Former Smoker    Smokeless tobacco: Never Used   Substance and Sexual Activity    Alcohol use: Yes     Comment: social    Drug use: No    Sexual activity: Not on file     Comment: Denied high risk sexual behavior   Lifestyle    Physical activity:     Days per week: Not on file     Minutes per session: Not on file    Stress: Not on file   Relationships    Social connections:     Talks on phone: Not on file     Gets together: Not on file     Attends Jewish service: Not on file     Active member of club or organization: Not on file     Attends meetings of clubs or organizations: Not on file     Relationship status: Not on file    Intimate partner violence:     Fear of current or ex partner: Not on file     Emotionally abused: Not on file     Physically abused: Not on file     Forced sexual activity: Not on file   Other Topics Concern    Not on file   Social History Narrative    Not on file       Review of Systems   Respiratory: Negative for shortness of breath  Cardiovascular: Negative for chest pain  Gastrointestinal: Negative for abdominal pain  Objective:  Vitals:    07/28/20 1313   BP: 110/68   BP Location: Left arm   Patient Position: Sitting   Cuff Size: Standard   Pulse: 67   Resp: 16   Temp: 98 °F (36 7 °C)   TempSrc: Tympanic   SpO2: 98%   Weight: 101 kg (223 lb)   Height: 6' (1 829 m)     Body mass index is 30 24 kg/m²       Physical Exam   Constitutional: He is oriented to person, place, and time  He appears well-developed and well-nourished  Cardiovascular: Normal rate, regular rhythm and normal heart sounds  Pulmonary/Chest: Effort normal and breath sounds normal    Abdominal: Soft  There is no tenderness  Musculoskeletal: He exhibits no edema  Neurological: He is alert and oriented to person, place, and time  Nursing note and vitals reviewed  RESULTS:    No results found for this or any previous visit (from the past 1008 hour(s))  This note was created with voice recognition software  Phonic, grammatical and spelling errors may be present within the note as a result

## 2020-08-08 ENCOUNTER — APPOINTMENT (OUTPATIENT)
Dept: LAB | Facility: HOSPITAL | Age: 70
End: 2020-08-08
Attending: INTERNAL MEDICINE
Payer: COMMERCIAL

## 2020-08-08 DIAGNOSIS — M79.10 MYALGIA: ICD-10-CM

## 2020-08-08 DIAGNOSIS — I10 BENIGN HYPERTENSION: ICD-10-CM

## 2020-08-08 DIAGNOSIS — E78.2 MIXED HYPERLIPIDEMIA: ICD-10-CM

## 2020-08-08 DIAGNOSIS — E55.9 VITAMIN D DEFICIENCY: ICD-10-CM

## 2020-08-08 LAB
25(OH)D3 SERPL-MCNC: 31.5 NG/ML (ref 30–100)
ALBUMIN SERPL BCP-MCNC: 3.5 G/DL (ref 3.5–5)
ALP SERPL-CCNC: 63 U/L (ref 46–116)
ALT SERPL W P-5'-P-CCNC: 29 U/L (ref 12–78)
ANION GAP SERPL CALCULATED.3IONS-SCNC: 6 MMOL/L (ref 4–13)
AST SERPL W P-5'-P-CCNC: 21 U/L (ref 5–45)
BASOPHILS # BLD AUTO: 0.09 THOUSANDS/ΜL (ref 0–0.1)
BASOPHILS NFR BLD AUTO: 1 % (ref 0–1)
BILIRUB SERPL-MCNC: 1.1 MG/DL (ref 0.2–1)
BUN SERPL-MCNC: 19 MG/DL (ref 5–25)
CALCIUM SERPL-MCNC: 9 MG/DL (ref 8.3–10.1)
CHLORIDE SERPL-SCNC: 104 MMOL/L (ref 100–108)
CHOLEST SERPL-MCNC: 196 MG/DL (ref 50–200)
CO2 SERPL-SCNC: 31 MMOL/L (ref 21–32)
CREAT SERPL-MCNC: 1.09 MG/DL (ref 0.6–1.3)
CRP SERPL QL: 6.2 MG/L
EOSINOPHIL # BLD AUTO: 0.26 THOUSAND/ΜL (ref 0–0.61)
EOSINOPHIL NFR BLD AUTO: 4 % (ref 0–6)
ERYTHROCYTE [DISTWIDTH] IN BLOOD BY AUTOMATED COUNT: 17.5 % (ref 11.6–15.1)
GFR SERPL CREATININE-BSD FRML MDRD: 70 ML/MIN/1.73SQ M
GLUCOSE P FAST SERPL-MCNC: 125 MG/DL (ref 65–99)
HCT VFR BLD AUTO: 42.4 % (ref 36.5–49.3)
HDLC SERPL-MCNC: 38 MG/DL
HGB BLD-MCNC: 14.2 G/DL (ref 12–17)
IMM GRANULOCYTES # BLD AUTO: 0.02 THOUSAND/UL (ref 0–0.2)
IMM GRANULOCYTES NFR BLD AUTO: 0 % (ref 0–2)
LDLC SERPL CALC-MCNC: 111 MG/DL (ref 0–100)
LYMPHOCYTES # BLD AUTO: 1.96 THOUSANDS/ΜL (ref 0.6–4.47)
LYMPHOCYTES NFR BLD AUTO: 31 % (ref 14–44)
MCH RBC QN AUTO: 32.6 PG (ref 26.8–34.3)
MCHC RBC AUTO-ENTMCNC: 33.5 G/DL (ref 31.4–37.4)
MCV RBC AUTO: 97 FL (ref 82–98)
MONOCYTES # BLD AUTO: 0.52 THOUSAND/ΜL (ref 0.17–1.22)
MONOCYTES NFR BLD AUTO: 8 % (ref 4–12)
NEUTROPHILS # BLD AUTO: 3.42 THOUSANDS/ΜL (ref 1.85–7.62)
NEUTS SEG NFR BLD AUTO: 56 % (ref 43–75)
NONHDLC SERPL-MCNC: 158 MG/DL
NRBC BLD AUTO-RTO: 0 /100 WBCS
PLATELET # BLD AUTO: 315 THOUSANDS/UL (ref 149–390)
PMV BLD AUTO: 11 FL (ref 8.9–12.7)
POTASSIUM SERPL-SCNC: 4.4 MMOL/L (ref 3.5–5.3)
PROT SERPL-MCNC: 6.8 G/DL (ref 6.4–8.2)
RBC # BLD AUTO: 4.36 MILLION/UL (ref 3.88–5.62)
SODIUM SERPL-SCNC: 141 MMOL/L (ref 136–145)
T4 FREE SERPL-MCNC: 0.85 NG/DL (ref 0.76–1.46)
TRIGL SERPL-MCNC: 233 MG/DL
TSH SERPL DL<=0.05 MIU/L-ACNC: 2.31 UIU/ML (ref 0.36–3.74)
WBC # BLD AUTO: 6.27 THOUSAND/UL (ref 4.31–10.16)

## 2020-08-08 PROCEDURE — 80053 COMPREHEN METABOLIC PANEL: CPT

## 2020-08-08 PROCEDURE — 86140 C-REACTIVE PROTEIN: CPT

## 2020-08-08 PROCEDURE — 80061 LIPID PANEL: CPT

## 2020-08-08 PROCEDURE — 85025 COMPLETE CBC W/AUTO DIFF WBC: CPT

## 2020-08-08 PROCEDURE — 82306 VITAMIN D 25 HYDROXY: CPT

## 2020-08-08 PROCEDURE — 36415 COLL VENOUS BLD VENIPUNCTURE: CPT

## 2020-08-08 PROCEDURE — 84443 ASSAY THYROID STIM HORMONE: CPT

## 2020-08-08 PROCEDURE — 84439 ASSAY OF FREE THYROXINE: CPT

## 2020-08-10 DIAGNOSIS — R73.01 IMPAIRED FASTING GLUCOSE: Primary | ICD-10-CM

## 2020-08-25 ENCOUNTER — APPOINTMENT (OUTPATIENT)
Dept: RADIOLOGY | Facility: CLINIC | Age: 70
End: 2020-08-25
Payer: COMMERCIAL

## 2020-08-25 ENCOUNTER — CONSULT (OUTPATIENT)
Dept: OBGYN CLINIC | Facility: CLINIC | Age: 70
End: 2020-08-25
Payer: COMMERCIAL

## 2020-08-25 VITALS
RESPIRATION RATE: 20 BRPM | BODY MASS INDEX: 29.93 KG/M2 | HEART RATE: 53 BPM | DIASTOLIC BLOOD PRESSURE: 73 MMHG | SYSTOLIC BLOOD PRESSURE: 131 MMHG | HEIGHT: 72 IN | TEMPERATURE: 97.6 F | WEIGHT: 221 LBS

## 2020-08-25 DIAGNOSIS — M25.562 LEFT KNEE PAIN, UNSPECIFIED CHRONICITY: ICD-10-CM

## 2020-08-25 DIAGNOSIS — M25.561 RIGHT KNEE PAIN, UNSPECIFIED CHRONICITY: ICD-10-CM

## 2020-08-25 DIAGNOSIS — M17.0 BILATERAL PRIMARY OSTEOARTHRITIS OF KNEE: ICD-10-CM

## 2020-08-25 DIAGNOSIS — M25.561 CHRONIC PAIN OF RIGHT KNEE: ICD-10-CM

## 2020-08-25 DIAGNOSIS — M25.561 RIGHT KNEE PAIN, UNSPECIFIED CHRONICITY: Primary | ICD-10-CM

## 2020-08-25 DIAGNOSIS — G89.29 CHRONIC PAIN OF RIGHT KNEE: ICD-10-CM

## 2020-08-25 PROCEDURE — 3075F SYST BP GE 130 - 139MM HG: CPT | Performed by: ORTHOPAEDIC SURGERY

## 2020-08-25 PROCEDURE — 1160F RVW MEDS BY RX/DR IN RCRD: CPT | Performed by: ORTHOPAEDIC SURGERY

## 2020-08-25 PROCEDURE — 3008F BODY MASS INDEX DOCD: CPT | Performed by: ORTHOPAEDIC SURGERY

## 2020-08-25 PROCEDURE — 99203 OFFICE O/P NEW LOW 30 MIN: CPT | Performed by: ORTHOPAEDIC SURGERY

## 2020-08-25 PROCEDURE — 1036F TOBACCO NON-USER: CPT | Performed by: ORTHOPAEDIC SURGERY

## 2020-08-25 PROCEDURE — 3078F DIAST BP <80 MM HG: CPT | Performed by: ORTHOPAEDIC SURGERY

## 2020-08-25 PROCEDURE — 4040F PNEUMOC VAC/ADMIN/RCVD: CPT | Performed by: ORTHOPAEDIC SURGERY

## 2020-08-25 PROCEDURE — 20610 DRAIN/INJ JOINT/BURSA W/O US: CPT | Performed by: ORTHOPAEDIC SURGERY

## 2020-08-25 PROCEDURE — 73562 X-RAY EXAM OF KNEE 3: CPT

## 2020-08-25 RX ADMIN — BUPIVACAINE HYDROCHLORIDE 2 ML: 5 INJECTION, SOLUTION EPIDURAL; INTRACAUDAL at 17:13

## 2020-08-25 RX ADMIN — METHYLPREDNISOLONE ACETATE 2 ML: 40 INJECTION, SUSPENSION INTRA-ARTICULAR; INTRALESIONAL; INTRAMUSCULAR; SOFT TISSUE at 17:13

## 2020-08-25 RX ADMIN — LIDOCAINE HYDROCHLORIDE 2 ML: 10 INJECTION, SOLUTION INFILTRATION; PERINEURAL at 17:13

## 2020-08-25 NOTE — PROGRESS NOTES
HPI:  Patient is a 78y o  year old  male  who presents with chief complaint of diffuse bilateral knee pain when he goes from sitting to standing and visa versa  Pt states that his pain increases more toward the end of the day  Pt was taking aleve for his knee pain although he stopped due to ankle swelling  Patient denies catching and locking  Patient states that his right knee is more bothersome than the left  Patient has history of a meniscal surgery although he knows that it did occur in 2010 but is unsure which knee        ROS:   General: No fever, no chills, no weight loss, no weight gain  HEENT:  No loss of hearing, no nose bleeds, no sore throat  Eyes:  No eye pain, no red eyes, no visual disturbance  Respiratory:  No cough, no shortness of breath, no wheezing  Cardiovascular:  No chest pain, no palpitations, no edema  GI: No abdominal pain, no nausea, no vomiting  Endocrine: No frequent urination, no excessive thirst  Urinary:  No dysuria, no hematuria, no incontinence  Musculoskeletal: see HPI and PE  Skin:  No rash, no wounds  Neurological:  No dizziness, no headache, no numbness  Psychiatric:  No difficulty concentrating, no depression, no suicide thoughts, no anxiety  Review of all other systems is negative    PMH:  Past Medical History:   Diagnosis Date    Hyperlipidemia     Hypertension        PSH:  Past Surgical History:   Procedure Laterality Date    ABDOMINAL SURGERY      KNEE ARTHROSCOPY      NASAL SINUS SURGERY      KS ARTHROSCOPY SHOULDER SURGICAL BICEPS TENODESIS Right 1/25/2017    Procedure: ARTHROSCOPIC BICEPS TENODESIS;  Surgeon: Teto Rivera MD;  [facility];  Service: Orthopedics    KS ALVARO ARTHROSCOP,EXTEN DEBRIDE Right 1/25/2017    Procedure: ARTHROSCOPY SHOULDER;  Surgeon: Teto Rivera MD;  [facility];  Service: Orthopedics    [facility] TANGR ARTHROSCOP,SURG,W/ROTAT CUFF REPR Right 1/25/2017    Procedure: SHOULDER ARTHROSCOPY ROTATOR CUFF REPAIR ; Surgeon: Delores Layne MD;  [facility];  Service: Orthopedics    TONSILLECTOMY         Medications:  Current Outpatient Medications   Medication Sig Dispense Refill    aspirin 81 MG tablet Take 81 mg by mouth daily      atenolol (TENORMIN) 50 mg tablet TAKE 1 TABLET DAILY 90 tablet 4    Cholecalciferol (VITAMIN D3) 2000 UNITS capsule Take by mouth      esomeprazole (NexIUM) 20 mg capsule Take 20 mg by mouth every morning before breakfast      FLUoxetine (PROzac) 10 mg capsule TAKE 1 CAPSULE DAILY 90 capsule 1    olmesartan-hydrochlorothiazide (BENICAR HCT) 20-12 5 MG per tablet TAKE 1 TABLET DAILY 90 tablet 4    simvastatin (ZOCOR) 20 mg tablet TAKE 1 TABLET DAILY 90 tablet 1     No current facility-administered medications for this visit  Allergies: Allergies   Allergen Reactions    Amlodipine Swelling    Lisinopril      Other reaction(s): Cough  Other reaction(s): Cough       Family History:  Family History   Problem Relation Age of Onset    Cancer Father     Heart disease Father     Hyperlipidemia Father     Hypertension Father     Brain cancer Father    Amina Roudebraau Migraines Mother        Social History:  Social History     Occupational History    Occupation: Part time   Tobacco Use    Smoking status: Former Smoker    Smokeless tobacco: Never Used   Substance and Sexual Activity    Alcohol use: Yes     Comment: social    Drug use: No    Sexual activity: Not on file     Comment: Denied high risk sexual behavior       Physical Exam:  General :  Alert, cooperative, no distress, appears stated age  Blood pressure 131/73, pulse (!) 53, temperature 97 6 °F (36 4 °C), temperature source Temporal, resp  rate 20, height 6' (1 829 m), weight 100 kg (221 lb)  Head:  Normocephalic, without obvious abnormality, atraumatic   Eyes:  Conjunctiva/corneas clear, EOM's intact,   Ears: Both ears normal appearance, no hearing deficits      Nose: Nares normal, septum midline, no drainage    Neck: Supple,  trachea midline, no adenopathy, no tenderness, no mass   Back:   Symmetric, no curvature, ROM normal, no tenderness   Lungs:   Respirations unlabored   Chest Wall:  No tenderness or deformity   Extremities: Extremities normal, atraumatic, no cyanosis or edema      Pulses: 2+ and symmetric   Skin: Skin color, texture, turgor normal, no rashes or lesions      Neurologic: Normal           Right Knee Exam     Range of Motion   The patient has normal right knee ROM  Tests   Varus: negative Valgus: negative  Lachman:  Anterior - negative    Posterior - negative    Other   Erythema: absent  Swelling: none  Effusion: no effusion present      Left Knee Exam     Range of Motion   The patient has normal left knee ROM  Tests   Varus: negative Valgus: negative  Lachman:  Anterior - negative    Posterior - negative    Other   Erythema: absent  Swelling: none  Effusion: no effusion present            Imaging Studies: The following imaging studies were reviewed in office today  My findings are noted  xrays of the right knee - degenerative changes including sharpening of the tibial spines, schlerotic changes at the trochlea groove    xrays of the left knee performed today show degenerative changes consistent with mild OA    Assessment  Encounter Diagnoses   Name Primary?  Chronic pain of right knee     Right knee pain, unspecified chronicity Yes    Left knee pain, unspecified chronicity     Bilateral primary osteoarthritis of knee          Plan:    Reviewing x-rays and examining patient he was advised that his pain is coming from mild bilateral knee osteoarthritis  Patient was advised the conservative treatments of osteoarthritis consist of anti-inflammatories, analgesics, bracing, cortisone steroid injections, assisted devices, therapy  After lengthy discussion patient was provided with cortisone steroid injection for his right knee which is more painful than his left    Order was placed for bilateral knee therapy  Patient will follow up in the office in 4 weeks      Large joint arthrocentesis: R knee  Date/Time: 8/25/2020 5:13 PM  Consent given by: patient  Site marked: site marked  Timeout: Immediately prior to procedure a time out was called to verify the correct patient, procedure, equipment, support staff and site/side marked as required   Supporting Documentation  Indications: pain   Procedure Details  Location: knee - R knee  Preparation: Patient was prepped and draped in the usual sterile fashion  Medications administered: 2 mL bupivacaine (PF) 0 5 %; 2 mL lidocaine 1 %; 2 mL methylPREDNISolone acetate 40 mg/mL    Patient tolerance: patient tolerated the procedure well with no immediate complications  Dressing:  Sterile dressing applied          Scribe Attestation    I,:   Stacy Hamm am acting as a scribe while in the presence of the attending physician :        I,:   Shahida Fernández MD personally performed the services described in this documentation    as scribed in my presence :

## 2020-08-26 RX ORDER — LIDOCAINE HYDROCHLORIDE 10 MG/ML
2 INJECTION, SOLUTION INFILTRATION; PERINEURAL
Status: COMPLETED | OUTPATIENT
Start: 2020-08-25 | End: 2020-08-25

## 2020-08-26 RX ORDER — METHYLPREDNISOLONE ACETATE 40 MG/ML
2 INJECTION, SUSPENSION INTRA-ARTICULAR; INTRALESIONAL; INTRAMUSCULAR; SOFT TISSUE
Status: COMPLETED | OUTPATIENT
Start: 2020-08-25 | End: 2020-08-25

## 2020-08-26 RX ORDER — BUPIVACAINE HYDROCHLORIDE 5 MG/ML
2 INJECTION, SOLUTION EPIDURAL; INTRACAUDAL
Status: COMPLETED | OUTPATIENT
Start: 2020-08-25 | End: 2020-08-25

## 2020-09-03 ENCOUNTER — EVALUATION (OUTPATIENT)
Dept: PHYSICAL THERAPY | Facility: CLINIC | Age: 70
End: 2020-09-03
Payer: COMMERCIAL

## 2020-09-03 DIAGNOSIS — M25.561 CHRONIC PAIN OF RIGHT KNEE: ICD-10-CM

## 2020-09-03 DIAGNOSIS — G89.29 CHRONIC PAIN OF RIGHT KNEE: ICD-10-CM

## 2020-09-03 DIAGNOSIS — M25.561 RIGHT KNEE PAIN, UNSPECIFIED CHRONICITY: ICD-10-CM

## 2020-09-03 DIAGNOSIS — M25.562 LEFT KNEE PAIN, UNSPECIFIED CHRONICITY: ICD-10-CM

## 2020-09-03 PROCEDURE — 97110 THERAPEUTIC EXERCISES: CPT

## 2020-09-03 PROCEDURE — 97161 PT EVAL LOW COMPLEX 20 MIN: CPT

## 2020-09-03 NOTE — PROGRESS NOTES
PT Evaluation     Today's date: 9/3/2020  Patient name: Fern Crabtree  : 1950  MRN: [id number]  Referring provider: Yousuf Cormier MD  Dx:   Encounter Diagnosis     ICD-10-CM    1  Chronic pain of right knee  M25 561 Ambulatory referral to Physical Therapy    G89 29    2  Right knee pain, unspecified chronicity  M25 561 Ambulatory referral to Physical Therapy   3  Left knee pain, unspecified chronicity  M25 562 Ambulatory referral to Physical Therapy                  Assessment  Assessment details: Pt presented with hx bilateral knee pain  Reported pain with deep squatting and moving sit to stand  Symptoms have resolved recently  Did receive injection right knee  Reports no pain at this time  PT notes WNL strength and ROM  At this time pt to continue with HEP for LE mm flexibility and strength  Discussed biking as well  Pt to return/call if any issues arise  Will continue PT as indicated  Impairments: lacks appropriate home exercise program    Goals  ST  Increase knee strength to 5/5 6 wk  2   Pt independent HEP 6 wk  3   Pt will have no difficulty with sit to stand 6 wk    LT  Pt will report no pain 12 wk  2  Increase knee strength to WNL 12 wk  3  Pt will report no limitations with ambulation 12 wk  4  Pt will report no limitations with ADL's 12 wk    Plan  Plan details: Pt to continue with HEP at this time  Will return to PT if needed  Patient would benefit from: PT eval and skilled physical therapy  Planned modality interventions: cryotherapy and thermotherapy: hydrocollator packs  Planned therapy interventions: manual therapy, neuromuscular re-education, strengthening, stretching, therapeutic activities, therapeutic exercise, flexibility, functional ROM exercises and home exercise program  Frequency: 3x week  Duration in weeks: 12  Treatment plan discussed with: patient        Subjective Evaluation    History of Present Illness  Mechanism of injury:  Hx working on hard surfaces  Had right knee arthroscopy approx 10 years due to this  Has been experiencing pain in bilateral knees for the past year  Difficulty moving sit to stand and stand to sit  Saw Ortho MD and given injection right knee  Reports recently little to no pain bilateral knees  No instability noted bilateral knee  No swelling bilateral knee  Reports swelling bilateral ankle  MD is changing medications for this  "Squishing" noise reported when ascending steps  No sleep disruptions due to knees  Symptoms were primarily with deep squat and return to stand and sit to stand from chair  Pain  Current pain ratin  At best pain ratin  At worst pain ratin  Progression: improved    Life stress: Works at [employer],   Working Full Time    Treatments  Current treatment: injection treatment  Patient Goals  Patient goals for therapy: decreased pain  Patient goal: Alleviate symptoms without surgery        Objective     Tenderness     Additional Tenderness Details  No tenderness to palpation bilateral knees    Active Range of Motion   Left Knee   Flexion: 135 degrees   Extension: 0 degrees     Right Knee   Flexion: 135 degrees   Extension: 0 degrees     Additional Active Range of Motion Details  Reports mm tightness medial/lateral aspect LLE with end range knee flexion    Mobility   Patellar Mobility:   Left Knee   WFL: medial, lateral, superior and inferior       Right Knee   WFL: medial, lateral, superior and inferior    Strength/Myotome Testing     Left Knee   Flexion: 4+  Extension: 4+    Right Knee   Flexion: 4+  Extension: 4+    Swelling     Left Knee Girth Measurement (cm)   Joint line: 42 cm    Right Knee Girth Measurement (cm)   Joint line: 42 cm    Ambulation     Ambulation: Stairs   Ascend stairs: independent  Pattern: reciprocal  Descend stairs: independent  Pattern: reciprocal    Observational Gait   Gait: within functional limits       Flowsheet Rows      Most Recent Value   PT/OT G-Codes Current Score  72   Projected Score  72   G code set  Carrying, Moving & Handling Objects                Precautions:  N/A       Manuals 9-3-20                                       Neuro Re-Ed                                                                 Ther Ex                                                                HEP Instructed and issued HEP       Ther Activity                        Gait Training                        Modalities

## 2020-09-22 NOTE — PROGRESS NOTES
PT DISCHARGE     Today's date: 2020  Patient name: Cole Lanier  : 1950  MRN: [id number]  Referring provider: Cherry Varner MD  Dx:   Encounter Diagnosis     ICD-10-CM    1  Chronic pain of right knee  M25 561 Ambulatory referral to Physical Therapy    G89 29 PT plan of care cert/re-cert   2  Right knee pain, unspecified chronicity  M25 561 Ambulatory referral to Physical Therapy     PT plan of care cert/re-cert   3  Left knee pain, unspecified chronicity  M25 562 Ambulatory referral to Physical Therapy     PT plan of care cert/re-cert       Start Time: 911  Stop Time:   Total time in clinic (min): 40 minutes    Assessment  Assessment details: Pt presented with hx bilateral knee pain  Reported pain with deep squatting and moving sit to stand  Symptoms had resolved recently  Did receive injection right knee  Reported no pain at evaluation  PT noted WNL strength and ROM  Pt issued HEP for LE mm flexibility and strength  Discussed biking as well  Pt was to return/call if any issues arise  He did not return following evaluation and HEP on 9-3-20  D/C PT services  Impairments: lacks appropriate home exercise program    Goals  ST  Increase knee strength to 5/5 6 wk  2   Pt independent HEP 6 wk  3   Pt will have no difficulty with sit to stand 6 wk    LT  Pt will report no pain 12 wk  2  Increase knee strength to WNL 12 wk  3  Pt will report no limitations with ambulation 12 wk  4    Pt will report no limitations with ADL's 12 wk    Plan  Plan details: D/C PT services  Findings per initial evaluation   Patient would benefit from: PT eval and skilled physical therapy  Planned modality interventions: cryotherapy and thermotherapy: hydrocollator packs  Planned therapy interventions: manual therapy, neuromuscular re-education, strengthening, stretching, therapeutic activities, therapeutic exercise, flexibility, functional ROM exercises and home exercise program        Subjective Evaluation    History of Present Illness  Mechanism of injury: Hx working on hard surfaces  Had right knee arthroscopy approx 10 years due to this  Has been experiencing pain in bilateral knees for the past year  Difficulty moving sit to stand and stand to sit  Saw Ortho MD and given injection right knee  Reports recently little to no pain bilateral knees  No instability noted bilateral knee  No swelling bilateral knee  Reports swelling bilateral ankle  MD is changing medications for this  "Squishing" noise reported when ascending steps  No sleep disruptions due to knees  Symptoms were primarily with deep squat and return to stand and sit to stand from chair  Pain  Current pain ratin  At best pain ratin  At worst pain ratin  Progression: improved    Life stress: Works at [employer],   Working Full Time    Treatments  Current treatment: injection treatment  Patient Goals  Patient goals for therapy: decreased pain  Patient goal: Alleviate symptoms without surgery        Objective     Tenderness     Additional Tenderness Details  No tenderness to palpation bilateral knees    Active Range of Motion   Left Knee   Flexion: 135 degrees   Extension: 0 degrees     Right Knee   Flexion: 135 degrees   Extension: 0 degrees     Additional Active Range of Motion Details  Reports mm tightness medial/lateral aspect LLE with end range knee flexion    Mobility   Patellar Mobility:   Left Knee   WFL: medial, lateral, superior and inferior       Right Knee   WFL: medial, lateral, superior and inferior    Strength/Myotome Testing     Left Knee   Flexion: 4+  Extension: 4+    Right Knee   Flexion: 4+  Extension: 4+    Swelling     Left Knee Girth Measurement (cm)   Joint line: 42 cm    Right Knee Girth Measurement (cm)   Joint line: 42 cm    Ambulation     Ambulation: Stairs   Ascend stairs: independent  Pattern: reciprocal  Descend stairs: independent  Pattern: reciprocal    Observational Gait   Gait: within functional limits       Flowsheet Rows      Most Recent Value   PT/OT G-Codes   Current Score  72   Projected Score  72   G code set  Carrying, Moving & Handling Objects

## 2020-09-25 DIAGNOSIS — F32.89 OTHER DEPRESSION: ICD-10-CM

## 2020-09-25 RX ORDER — FLUOXETINE 10 MG/1
CAPSULE ORAL
Qty: 90 CAPSULE | Refills: 3 | Status: SHIPPED | OUTPATIENT
Start: 2020-09-25 | End: 2021-08-16 | Stop reason: SDUPTHER

## 2021-02-05 DIAGNOSIS — I10 BENIGN HYPERTENSION: ICD-10-CM

## 2021-02-05 RX ORDER — OLMESARTAN MEDOXOMIL AND HYDROCHLOROTHIAZIDE 20/12.5 20; 12.5 MG/1; MG/1
TABLET ORAL
Qty: 90 TABLET | Refills: 1 | Status: SHIPPED | OUTPATIENT
Start: 2021-02-05 | End: 2021-08-04

## 2021-02-06 ENCOUNTER — LAB (OUTPATIENT)
Dept: LAB | Facility: HOSPITAL | Age: 71
End: 2021-02-06
Attending: INTERNAL MEDICINE
Payer: COMMERCIAL

## 2021-02-06 DIAGNOSIS — R73.01 IMPAIRED FASTING GLUCOSE: ICD-10-CM

## 2021-02-06 LAB
EST. AVERAGE GLUCOSE BLD GHB EST-MCNC: 131 MG/DL
HBA1C MFR BLD: 6.2 %

## 2021-02-06 PROCEDURE — 36415 COLL VENOUS BLD VENIPUNCTURE: CPT

## 2021-02-06 PROCEDURE — 83036 HEMOGLOBIN GLYCOSYLATED A1C: CPT

## 2021-02-10 ENCOUNTER — OFFICE VISIT (OUTPATIENT)
Dept: INTERNAL MEDICINE CLINIC | Facility: CLINIC | Age: 71
End: 2021-02-10
Payer: COMMERCIAL

## 2021-02-10 VITALS
DIASTOLIC BLOOD PRESSURE: 68 MMHG | HEART RATE: 64 BPM | WEIGHT: 224 LBS | HEIGHT: 72 IN | RESPIRATION RATE: 16 BRPM | OXYGEN SATURATION: 99 % | BODY MASS INDEX: 30.34 KG/M2 | TEMPERATURE: 98.5 F | SYSTOLIC BLOOD PRESSURE: 120 MMHG

## 2021-02-10 DIAGNOSIS — Z23 NEED FOR INFLUENZA VACCINATION: ICD-10-CM

## 2021-02-10 DIAGNOSIS — R60.0 BILATERAL EDEMA OF LOWER EXTREMITY: ICD-10-CM

## 2021-02-10 DIAGNOSIS — F32.A DEPRESSION, UNSPECIFIED DEPRESSION TYPE: ICD-10-CM

## 2021-02-10 DIAGNOSIS — I10 BENIGN HYPERTENSION: Primary | ICD-10-CM

## 2021-02-10 DIAGNOSIS — E78.2 MIXED HYPERLIPIDEMIA: ICD-10-CM

## 2021-02-10 DIAGNOSIS — R35.0 URINARY FREQUENCY: ICD-10-CM

## 2021-02-10 PROCEDURE — 90471 IMMUNIZATION ADMIN: CPT | Performed by: INTERNAL MEDICINE

## 2021-02-10 PROCEDURE — 1036F TOBACCO NON-USER: CPT | Performed by: INTERNAL MEDICINE

## 2021-02-10 PROCEDURE — 3725F SCREEN DEPRESSION PERFORMED: CPT | Performed by: INTERNAL MEDICINE

## 2021-02-10 PROCEDURE — 99214 OFFICE O/P EST MOD 30 MIN: CPT | Performed by: INTERNAL MEDICINE

## 2021-02-10 PROCEDURE — 90662 IIV NO PRSV INCREASED AG IM: CPT | Performed by: INTERNAL MEDICINE

## 2021-02-10 PROCEDURE — 3008F BODY MASS INDEX DOCD: CPT | Performed by: INTERNAL MEDICINE

## 2021-02-10 PROCEDURE — 3074F SYST BP LT 130 MM HG: CPT | Performed by: INTERNAL MEDICINE

## 2021-02-10 PROCEDURE — 1160F RVW MEDS BY RX/DR IN RCRD: CPT | Performed by: INTERNAL MEDICINE

## 2021-02-10 PROCEDURE — 3078F DIAST BP <80 MM HG: CPT | Performed by: INTERNAL MEDICINE

## 2021-02-10 NOTE — PROGRESS NOTES
Assessment/Plan:      chronic problems appear stable  For the urinary symptoms, suspect prostate but will double check PSA and rule out UTI  If those are negative, will try him with Flomax  Ordered his other labs as well  Quality Measures:       Return in about 6 months (around 8/10/2021)  No problem-specific Assessment & Plan notes found for this encounter  Diagnoses and all orders for this visit:    Benign hypertension  -     CBC and differential; Future  -     Comprehensive metabolic panel; Future    Mixed hyperlipidemia  -     Lipid panel; Future    Depression, unspecified depression type    Urinary frequency  -     PSA, Total Screen; Future  -     Urinalysis with microscopic; Future  -     Urine culture; Future    Need for influenza vaccination  -     influenza vaccine, high-dose, PF 0 7 mL (FLUZONE HIGH-DOSE)    Bilateral edema of lower extremity  -     VAS lower limb venous duplex study, complete bilateral; Future          Subjective:      Patient ID: Marsha Mishra is a 78 y o  male  Patient comes in today for routine follow-up  He has been working at [employer] during the pandemic  Doing okay in that regards  His blood pressure has been controlled  Cholesterol has been controlled but he is due for blood work  Still sees the swelling in his ankles  Workup so far has been negative  Lately, he has noticed that his urination is more frequent  Also increased urgency  No burning or discharge  Feels like he is not emptying his bladder and then sometimes has to go again 10 minutes later  His PSAs have been normal   He just did an A1c which was 6 2 and lower than last time        ALLERGIES:  Allergies   Allergen Reactions    Amlodipine Swelling    Lisinopril      Other reaction(s): Cough  Other reaction(s): Cough       CURRENT MEDICATIONS:    Current Outpatient Medications:     aspirin 81 MG tablet, Take 81 mg by mouth daily, Disp: , Rfl:     atenolol (TENORMIN) 50 mg tablet, TAKE 1 TABLET DAILY, Disp: 90 tablet, Rfl: 4    Cholecalciferol (VITAMIN D3) 2000 UNITS capsule, Take by mouth, Disp: , Rfl:     esomeprazole (NexIUM) 20 mg capsule, Take 20 mg by mouth every morning before breakfast, Disp: , Rfl:     FLUoxetine (PROzac) 10 mg capsule, TAKE 1 CAPSULE DAILY, Disp: 90 capsule, Rfl: 3    olmesartan-hydrochlorothiazide (BENICAR HCT) 20-12 5 MG per tablet, TAKE 1 TABLET DAILY, Disp: 90 tablet, Rfl: 1    simvastatin (ZOCOR) 20 mg tablet, TAKE 1 TABLET DAILY, Disp: 90 tablet, Rfl: 1    ACTIVE PROBLEM LIST:  Patient Active Problem List   Diagnosis    Complete tear of left rotator cuff    Biceps tendinitis    Benign hypertension    Allergic rhinitis    Hyperlipidemia    Depression    Impaired fasting glucose    Vitamin D deficiency       PAST MEDICAL HISTORY:  Past Medical History:   Diagnosis Date    Hyperlipidemia     Hypertension        PAST SURGICAL HISTORY:  Past Surgical History:   Procedure Laterality Date    ABDOMINAL SURGERY      KNEE ARTHROSCOPY      NASAL SINUS SURGERY      [place] ARTHROSCOPY SHOULDER SURGICAL BICEPS TENODESIS Right 1/25/2017    Procedure: ARTHROSCOPIC BICEPS TENODESIS;  Surgeon: Francesca Andrade MD;  [facility];  Service: Orthopedics    [place] SHLDR ARTHROSCOP,SURG,W/ROTAT CUFF REPR Right 1/25/2017    Procedure: SHOULDER ARTHROSCOPY ROTATOR CUFF REPAIR ;  Surgeon: Francesca Andrade MD;  [facility];  Service: Orthopedics    [place] SURGICAL ARTHROSCOPY Analisa Marques DBRDMT 3+ Right 1/25/2017    Procedure: ARTHROSCOPY SHOULDER;  Surgeon: Francesca Andrade MD;  [facility];  Service: Orthopedics    TONSILLECTOMY         FAMILY HISTORY:  Family History   Problem Relation Age of Onset    Cancer Father     Heart disease Father     Hyperlipidemia Father     Hypertension Father     Brain cancer Father     Migraines Mother        SOCIAL HISTORY:  Social History     Socioeconomic History    Marital status: /Civil Union Spouse name: Not on file    Number of children: Not on file    Years of education: Not on file    Highest education level: Not on file   Occupational History    Occupation: Part time   Social Needs    Financial resource strain: Not on file    Food insecurity     Worry: Not on file     Inability: Not on file   [employer] needs     Medical: Not on file     Non-medical: Not on file   Tobacco Use    Smoking status: Former Smoker    Smokeless tobacco: Never Used   Substance and Sexual Activity    Alcohol use: Yes     Comment: social    Drug use: No    Sexual activity: Not on file     Comment: Denied high risk sexual behavior   Lifestyle    Physical activity     Days per week: Not on file     Minutes per session: Not on file    Stress: Not on file   Relationships    Social connections     Talks on phone: Not on file     Gets together: Not on file     Attends Yazidi service: Not on file     Active member of club or organization: Not on file     Attends meetings of clubs or organizations: Not on file     Relationship status: Not on file    Intimate partner violence     Fear of current or ex partner: Not on file     Emotionally abused: Not on file     Physically abused: Not on file     Forced sexual activity: Not on file   Other Topics Concern    Not on file   Social History Narrative    Not on file       Review of Systems   Respiratory: Negative for shortness of breath  Cardiovascular: Negative for chest pain  Gastrointestinal: Negative for abdominal pain  Objective:  Vitals:    02/10/21 1612   BP: 120/68   BP Location: Left arm   Patient Position: Sitting   Cuff Size: Standard   Pulse: 64   Resp: 16   Temp: 98 5 °F (36 9 °C)   TempSrc: Tympanic   SpO2: 99%   Weight: 102 kg (224 lb)   Height: 6' (1 829 m)     Body mass index is 30 38 kg/m²  Physical Exam  Vitals signs and nursing note reviewed  Constitutional:       Appearance: He is well-developed     Cardiovascular:      Rate and Rhythm: Normal rate and regular rhythm  Heart sounds: Normal heart sounds  Pulmonary:      Effort: Pulmonary effort is normal       Breath sounds: Normal breath sounds  Abdominal:      Palpations: Abdomen is soft  Tenderness: There is no abdominal tenderness  Musculoskeletal:      Comments: Trace ankle edema   Neurological:      Mental Status: He is alert and oriented to person, place, and time  RESULTS:    Recent Results (from the past 1008 hour(s))   Hemoglobin A1C    Collection Time: 02/06/21  8:10 AM   Result Value Ref Range    Hemoglobin A1C 6 2 (H) Normal 3 8-5 6%; PreDiabetic 5 7-6 4%; Diabetic >=6 5%; Glycemic control for adults with diabetes <7 0% %     mg/dl       This note was created with voice recognition software  Phonic, grammatical and spelling errors may be present within the note as a result

## 2021-02-20 ENCOUNTER — LAB (OUTPATIENT)
Dept: LAB | Facility: HOSPITAL | Age: 71
End: 2021-02-20
Attending: INTERNAL MEDICINE
Payer: COMMERCIAL

## 2021-02-20 DIAGNOSIS — E78.2 MIXED HYPERLIPIDEMIA: ICD-10-CM

## 2021-02-20 DIAGNOSIS — R35.0 URINARY FREQUENCY: ICD-10-CM

## 2021-02-20 DIAGNOSIS — I10 BENIGN HYPERTENSION: ICD-10-CM

## 2021-02-20 LAB
ALBUMIN SERPL BCP-MCNC: 3.5 G/DL (ref 3.5–5)
ALP SERPL-CCNC: 58 U/L (ref 46–116)
ALT SERPL W P-5'-P-CCNC: 38 U/L (ref 12–78)
ANION GAP SERPL CALCULATED.3IONS-SCNC: 8 MMOL/L (ref 4–13)
AST SERPL W P-5'-P-CCNC: 24 U/L (ref 5–45)
BACTERIA UR QL AUTO: NORMAL /HPF
BASOPHILS # BLD AUTO: 0.11 THOUSANDS/ΜL (ref 0–0.1)
BASOPHILS NFR BLD AUTO: 2 % (ref 0–1)
BILIRUB SERPL-MCNC: 1.2 MG/DL (ref 0.2–1)
BILIRUB UR QL STRIP: NEGATIVE
BUN SERPL-MCNC: 23 MG/DL (ref 5–25)
CALCIUM SERPL-MCNC: 9.1 MG/DL (ref 8.3–10.1)
CHLORIDE SERPL-SCNC: 105 MMOL/L (ref 100–108)
CHOLEST SERPL-MCNC: 166 MG/DL (ref 50–200)
CLARITY UR: CLEAR
CO2 SERPL-SCNC: 30 MMOL/L (ref 21–32)
COLOR UR: YELLOW
CREAT SERPL-MCNC: 1.15 MG/DL (ref 0.6–1.3)
EOSINOPHIL # BLD AUTO: 0.18 THOUSAND/ΜL (ref 0–0.61)
EOSINOPHIL NFR BLD AUTO: 3 % (ref 0–6)
ERYTHROCYTE [DISTWIDTH] IN BLOOD BY AUTOMATED COUNT: 17.6 % (ref 11.6–15.1)
GFR SERPL CREATININE-BSD FRML MDRD: 65 ML/MIN/1.73SQ M
GLUCOSE P FAST SERPL-MCNC: 115 MG/DL (ref 65–99)
GLUCOSE UR STRIP-MCNC: NEGATIVE MG/DL
HCT VFR BLD AUTO: 42.5 % (ref 36.5–49.3)
HDLC SERPL-MCNC: 37 MG/DL
HGB BLD-MCNC: 14.4 G/DL (ref 12–17)
HGB UR QL STRIP.AUTO: NEGATIVE
IMM GRANULOCYTES # BLD AUTO: 0.02 THOUSAND/UL (ref 0–0.2)
IMM GRANULOCYTES NFR BLD AUTO: 0 % (ref 0–2)
KETONES UR STRIP-MCNC: NEGATIVE MG/DL
LDLC SERPL CALC-MCNC: 95 MG/DL (ref 0–100)
LEUKOCYTE ESTERASE UR QL STRIP: NEGATIVE
LYMPHOCYTES # BLD AUTO: 1.82 THOUSANDS/ΜL (ref 0.6–4.47)
LYMPHOCYTES NFR BLD AUTO: 28 % (ref 14–44)
MCH RBC QN AUTO: 32.7 PG (ref 26.8–34.3)
MCHC RBC AUTO-ENTMCNC: 33.9 G/DL (ref 31.4–37.4)
MCV RBC AUTO: 97 FL (ref 82–98)
MONOCYTES # BLD AUTO: 0.55 THOUSAND/ΜL (ref 0.17–1.22)
MONOCYTES NFR BLD AUTO: 9 % (ref 4–12)
NEUTROPHILS # BLD AUTO: 3.78 THOUSANDS/ΜL (ref 1.85–7.62)
NEUTS SEG NFR BLD AUTO: 58 % (ref 43–75)
NITRITE UR QL STRIP: NEGATIVE
NON-SQ EPI CELLS URNS QL MICRO: NORMAL /HPF
NONHDLC SERPL-MCNC: 129 MG/DL
NRBC BLD AUTO-RTO: 0 /100 WBCS
PH UR STRIP.AUTO: 6.5 [PH]
PLATELET # BLD AUTO: 345 THOUSANDS/UL (ref 149–390)
PMV BLD AUTO: 11.2 FL (ref 8.9–12.7)
POTASSIUM SERPL-SCNC: 4.9 MMOL/L (ref 3.5–5.3)
PROT SERPL-MCNC: 6.8 G/DL (ref 6.4–8.2)
PROT UR STRIP-MCNC: NEGATIVE MG/DL
PSA SERPL-MCNC: 3.1 NG/ML (ref 0–4)
RBC # BLD AUTO: 4.4 MILLION/UL (ref 3.88–5.62)
RBC #/AREA URNS AUTO: NORMAL /HPF
SODIUM SERPL-SCNC: 143 MMOL/L (ref 136–145)
SP GR UR STRIP.AUTO: 1.01 (ref 1–1.03)
TRIGL SERPL-MCNC: 172 MG/DL
UROBILINOGEN UR QL STRIP.AUTO: 0.2 E.U./DL
WBC # BLD AUTO: 6.46 THOUSAND/UL (ref 4.31–10.16)
WBC #/AREA URNS AUTO: NORMAL /HPF

## 2021-02-20 PROCEDURE — 80053 COMPREHEN METABOLIC PANEL: CPT

## 2021-02-20 PROCEDURE — 85025 COMPLETE CBC W/AUTO DIFF WBC: CPT

## 2021-02-20 PROCEDURE — 36415 COLL VENOUS BLD VENIPUNCTURE: CPT

## 2021-02-20 PROCEDURE — 81001 URINALYSIS AUTO W/SCOPE: CPT

## 2021-02-20 PROCEDURE — 80061 LIPID PANEL: CPT

## 2021-02-20 PROCEDURE — G0103 PSA SCREENING: HCPCS

## 2021-02-20 PROCEDURE — 87086 URINE CULTURE/COLONY COUNT: CPT

## 2021-02-21 LAB — BACTERIA UR CULT: ABNORMAL

## 2021-02-22 DIAGNOSIS — R35.0 URINARY FREQUENCY: Primary | ICD-10-CM

## 2021-02-22 RX ORDER — TAMSULOSIN HYDROCHLORIDE 0.4 MG/1
0.4 CAPSULE ORAL
Qty: 30 CAPSULE | Refills: 3 | Status: SHIPPED | OUTPATIENT
Start: 2021-02-22 | End: 2021-07-12

## 2021-02-23 DIAGNOSIS — I10 ESSENTIAL HYPERTENSION: ICD-10-CM

## 2021-02-23 RX ORDER — ATENOLOL 50 MG/1
TABLET ORAL
Qty: 90 TABLET | Refills: 3 | Status: SHIPPED | OUTPATIENT
Start: 2021-02-23 | End: 2022-02-18

## 2021-03-10 DIAGNOSIS — Z23 ENCOUNTER FOR IMMUNIZATION: ICD-10-CM

## 2021-05-26 ENCOUNTER — HOSPITAL ENCOUNTER (OUTPATIENT)
Dept: VASCULAR ULTRASOUND | Facility: HOSPITAL | Age: 71
Discharge: HOME | End: 2021-05-26
Attending: INTERNAL MEDICINE
Payer: COMMERCIAL

## 2021-05-26 DIAGNOSIS — R60.0 BILATERAL EDEMA OF LOWER EXTREMITY: ICD-10-CM

## 2021-05-26 PROCEDURE — 93970 EXTREMITY STUDY: CPT | Performed by: SURGERY

## 2021-05-26 PROCEDURE — 93970 EXTREMITY STUDY: CPT

## 2021-06-01 ENCOUNTER — OFFICE VISIT (OUTPATIENT)
Dept: OBGYN CLINIC | Facility: CLINIC | Age: 71
End: 2021-06-01
Payer: COMMERCIAL

## 2021-06-01 VITALS
HEART RATE: 60 BPM | HEIGHT: 72 IN | SYSTOLIC BLOOD PRESSURE: 130 MMHG | WEIGHT: 220 LBS | BODY MASS INDEX: 29.8 KG/M2 | DIASTOLIC BLOOD PRESSURE: 70 MMHG

## 2021-06-01 DIAGNOSIS — M17.0 BILATERAL PRIMARY OSTEOARTHRITIS OF KNEE: Primary | ICD-10-CM

## 2021-06-01 PROCEDURE — 3078F DIAST BP <80 MM HG: CPT | Performed by: ORTHOPAEDIC SURGERY

## 2021-06-01 PROCEDURE — 3075F SYST BP GE 130 - 139MM HG: CPT | Performed by: ORTHOPAEDIC SURGERY

## 2021-06-01 PROCEDURE — 1160F RVW MEDS BY RX/DR IN RCRD: CPT | Performed by: ORTHOPAEDIC SURGERY

## 2021-06-01 PROCEDURE — 3008F BODY MASS INDEX DOCD: CPT | Performed by: ORTHOPAEDIC SURGERY

## 2021-06-01 PROCEDURE — 20610 DRAIN/INJ JOINT/BURSA W/O US: CPT | Performed by: ORTHOPAEDIC SURGERY

## 2021-06-01 PROCEDURE — 99214 OFFICE O/P EST MOD 30 MIN: CPT | Performed by: ORTHOPAEDIC SURGERY

## 2021-06-01 PROCEDURE — 1036F TOBACCO NON-USER: CPT | Performed by: ORTHOPAEDIC SURGERY

## 2021-06-01 RX ORDER — BUPIVACAINE HYDROCHLORIDE 2.5 MG/ML
2 INJECTION, SOLUTION INFILTRATION; PERINEURAL
Status: COMPLETED | OUTPATIENT
Start: 2021-06-01 | End: 2021-06-01

## 2021-06-01 RX ORDER — METHYLPREDNISOLONE ACETATE 40 MG/ML
2 INJECTION, SUSPENSION INTRA-ARTICULAR; INTRALESIONAL; INTRAMUSCULAR; SOFT TISSUE
Status: COMPLETED | OUTPATIENT
Start: 2021-06-01 | End: 2021-06-01

## 2021-06-01 RX ADMIN — BUPIVACAINE HYDROCHLORIDE 2 ML: 2.5 INJECTION, SOLUTION INFILTRATION; PERINEURAL at 21:11

## 2021-06-01 RX ADMIN — METHYLPREDNISOLONE ACETATE 2 ML: 40 INJECTION, SUSPENSION INTRA-ARTICULAR; INTRALESIONAL; INTRAMUSCULAR; SOFT TISSUE at 21:11

## 2021-06-01 NOTE — PROGRESS NOTES
Orthopaedics Office Visit - Patient Visit    ASSESSMENT/PLAN:    Assessment:   Bilateral knee osteoarthritis, worse in patellofemoral compartments bilaterally    Plan:   Imaging and prognosis reviewed, offered and accepted CSI bilateral today, ice knee's over next few days, he can use tylenol, OTC voltaren gel for pain  Avoid high impact activities, stationary bike, elliptical for exercise and motion  Can repeat CSI every 3 months, if not effective can try gel injections in future  Call in 6 weeks if cortisone not effective to place order for gel injection    _____________________________________________________  CHIEF COMPLAINT:  Chief Complaint   Patient presents with    Left Knee - Pain         SUBJECTIVE:  Skinny Ricardo is a 78 y o  male who presents for evaluation of bilateral knee pain  He saw Dr Aurelio Quintana last year and had right knee injected with CSI which helped  Mild arthritis in both knee's  He stands all day at work on concrete floors, end of day is worse barely able to bend the knee's  General dull ache, throbbing with stiffness throughout the day  Getting in and out of cars is difficult at times  Pain is primary anterior and deep in his knee's  Denies any instability, locking  He also has back pain, hip pain, has ddd       PAST MEDICAL HISTORY:  Past Medical History:   Diagnosis Date    Hyperlipidemia     Hypertension        PAST SURGICAL HISTORY:  Past Surgical History:   Procedure Laterality Date    ABDOMINAL SURGERY      KNEE ARTHROSCOPY      NASAL SINUS SURGERY      [place] ARTHROSCOPY SHOULDER SURGICAL BICEPS TENODESIS Right 1/25/2017    Procedure: ARTHROSCOPIC BICEPS TENODESIS;  Surgeon: Caden Moura MD;  [facility];  Service: Orthopedics    [place] [address] [facility]W/ROTAT CUFF REPR Right 1/25/2017    Procedure: SHOULDER ARTHROSCOPY ROTATOR CUFF REPAIR ;  Surgeon: Caden Moura MD;  [facility];  Service: Orthopedics    [place] SURGICAL ARTHROSCOPY Robert G[facility] 3+ Right 1/25/2017    Procedure: ARTHROSCOPY SHOULDER;  Surgeon: Karl Matias MD;  [facility];  Service: Orthopedics    TONSILLECTOMY         FAMILY HISTORY:  Family History   Problem Relation Age of Onset    Cancer Father     Heart disease Father     Hyperlipidemia Father     Hypertension Father     Brain cancer Father     Migraines Mother        SOCIAL HISTORY:  Social History     Tobacco Use    Smoking status: Former Smoker    Smokeless tobacco: Never Used   Substance Use Topics    Alcohol use: Yes     Comment: social    Drug use: No       MEDICATIONS:    Current Outpatient Medications:     aspirin 81 MG tablet, Take 81 mg by mouth daily, Disp: , Rfl:     atenolol (TENORMIN) 50 mg tablet, TAKE 1 TABLET DAILY, Disp: 90 tablet, Rfl: 3    Cholecalciferol (VITAMIN D3) 2000 UNITS capsule, Take by mouth, Disp: , Rfl:     esomeprazole (NexIUM) 20 mg capsule, Take 20 mg by mouth every morning before breakfast, Disp: , Rfl:     FLUoxetine (PROzac) 10 mg capsule, TAKE 1 CAPSULE DAILY, Disp: 90 capsule, Rfl: 3    olmesartan-hydrochlorothiazide (BENICAR HCT) 20-12 5 MG per tablet, TAKE 1 TABLET DAILY, Disp: 90 tablet, Rfl: 1    simvastatin (ZOCOR) 20 mg tablet, TAKE 1 TABLET DAILY, Disp: 90 tablet, Rfl: 1    tamsulosin (FLOMAX) 0 4 mg, Take 1 capsule (0 4 mg total) by mouth daily with dinner, Disp: 30 capsule, Rfl: 3    ALLERGIES:  Allergies   Allergen Reactions    Amlodipine Swelling    Lisinopril      Other reaction(s): Cough  Other reaction(s): Cough       REVIEW OF SYSTEMS:  MSK: bilateral knee pain  Neuro: negative  Pertinent items are otherwise noted in HPI  A comprehensive review of systems was otherwise negative      LABS:  HgA1c:   Lab Results   Component Value Date    HGBA1C 6 2 (H) 02/06/2021     BMP:   Lab Results   Component Value Date    GLUCOSE 95 10/09/2015    CALCIUM 9 1 02/20/2021     10/09/2015    K 4 9 02/20/2021    CO2 30 02/20/2021     02/20/2021    BUN 23 02/20/2021    CREATININE 1 15 02/20/2021     CBC: No components found for: CBC    _____________________________________________________  PHYSICAL EXAMINATION:  Vital signs: /70   Pulse 60   Ht 6' (1 829 m)   Wt 99 8 kg (220 lb)   BMI 29 84 kg/m²   General: No acute distress, awake and alert  Psychiatric: Mood and affect appear appropriate  HEENT: Trachea Midline, No torticollis, no apparent facial trauma  Cardiovascular: No audible murmurs; Extremities appear perfused  Pulmonary: No audible wheezing or stridor  Skin: No open lesions; see further details (if any) below    MUSCULOSKELETAL EXAMINATION:  Extremities:  Bilateral knee's   No acute distress, skin intact, no erythema, no effusion  No specific tenderness to palpation bilateral  Full range of motion bilateral   Crepitation with motion bilateral   No varus or valgus laxity  Negative anterior drawer, lachman , Irma  Calf is supple  NVI  LE bilateral warm and diffuse       _____________________________________________________  STUDIES REVIEWED:  I personally reviewed the images and interpretation is as follows:  xr bilateral knee's mild to moderate degenerative changes, osteophytes    PROCEDURES PERFORMED:  Large joint arthrocentesis: R knee  Universal Protocol:  Consent: Verbal consent obtained    Risks and benefits: risks, benefits and alternatives were discussed  Consent given by: patient  Patient understanding: patient states understanding of the procedure being performed  Site marked: the operative site was marked  Patient identity confirmed: verbally with patient    Supporting Documentation  Indications: pain   Procedure Details  Location: knee - R knee  Preparation: Patient was prepped and draped in the usual sterile fashion  Needle size: 22 G  Ultrasound guidance: no  Approach: anterolateral  Medications administered: 2 mL bupivacaine 0 25 %; 2 mL methylPREDNISolone acetate 40 mg/mL    Patient tolerance: patient tolerated the procedure well with no immediate complications  Dressing:  Sterile dressing applied    Large joint arthrocentesis: L knee  Universal Protocol:  Consent: Verbal consent obtained  Consent given by: patient    Supporting Documentation  Indications: pain   Procedure Details  Location: knee - L knee  Preparation: betadine stick and alcohol    Needle size: 22 G  Approach: anteromedial  Medications administered: 2 mL bupivacaine 0 25 %; 2 mL methylPREDNISolone acetate 40 mg/mL            Marcello Persaud       Scribe Attestation    I,:  Rich Hodges am acting as a scribe while in the presence of the attending physician :       I,:  Brennan Anthony MD personally performed the services described in this documentation    as scribed in my presence :

## 2021-06-12 DIAGNOSIS — E78.5 HYPERLIPIDEMIA, UNSPECIFIED HYPERLIPIDEMIA TYPE: ICD-10-CM

## 2021-06-13 RX ORDER — SIMVASTATIN 20 MG
TABLET ORAL
Qty: 90 TABLET | Refills: 1 | Status: SHIPPED | OUTPATIENT
Start: 2021-06-13 | End: 2021-12-10

## 2021-07-11 DIAGNOSIS — R35.0 URINARY FREQUENCY: ICD-10-CM

## 2021-07-12 RX ORDER — TAMSULOSIN HYDROCHLORIDE 0.4 MG/1
CAPSULE ORAL
Qty: 30 CAPSULE | Refills: 2 | Status: SHIPPED | OUTPATIENT
Start: 2021-07-12 | End: 2021-10-25 | Stop reason: SDUPTHER

## 2021-07-27 ENCOUNTER — TELEPHONE (OUTPATIENT)
Dept: DERMATOLOGY | Facility: CLINIC | Age: 71
End: 2021-07-27

## 2021-07-28 ENCOUNTER — TELEPHONE (OUTPATIENT)
Dept: DERMATOLOGY | Facility: CLINIC | Age: 71
End: 2021-07-28

## 2021-07-28 NOTE — TELEPHONE ENCOUNTER
Pt called back wanting to schedule a new pt appt    I called him back, offered him the first available appt which was in November but pt didn't want to wait so long to see a

## 2021-07-29 ENCOUNTER — TELEPHONE (OUTPATIENT)
Dept: INTERNAL MEDICINE CLINIC | Facility: CLINIC | Age: 71
End: 2021-07-29

## 2021-07-29 ENCOUNTER — TELEPHONE (OUTPATIENT)
Dept: DERMATOLOGY | Facility: CLINIC | Age: 71
End: 2021-07-29

## 2021-07-29 NOTE — TELEPHONE ENCOUNTER
Called and left a detailed message for patient stating that Gisella Nunez is on vacation and that he can make an appointment if need be with one of the other providers if he is not feeling well, or if extreme symptoms begin to go to the ED  Informed patient to call with any questions or to make an appointment

## 2021-07-29 NOTE — TELEPHONE ENCOUNTER
Glucometer can't be ordered for patient being that he has no diagnosis of any impaired fasting glucose or diabetes issues  This can be addressed at patients upcoming appointment correct?

## 2021-07-29 NOTE — TELEPHONE ENCOUNTER
Patient wanted to know if there is any way that one of the providers that is covering for Dr Chavo Day could order blood work for diabetes  Patient said that he is showing all the signs of being a diabetic  Please advise    Niru Leon

## 2021-07-29 NOTE — TELEPHONE ENCOUNTER
I relayed A1c info but  Pt was more concerned with maybe checking his acute sugars right now ,  He says hes very shaky and Babak Briones had said he was prediabetic in the past      Is there anyway to order glucometer kit for him to test for now?   Pt doesn't have anything so far

## 2021-08-04 DIAGNOSIS — I10 BENIGN HYPERTENSION: ICD-10-CM

## 2021-08-04 RX ORDER — OLMESARTAN MEDOXOMIL AND HYDROCHLOROTHIAZIDE 20/12.5 20; 12.5 MG/1; MG/1
TABLET ORAL
Qty: 90 TABLET | Refills: 3 | Status: SHIPPED | OUTPATIENT
Start: 2021-08-04 | End: 2022-08-01

## 2021-08-05 ENCOUNTER — OFFICE VISIT (OUTPATIENT)
Dept: INTERNAL MEDICINE CLINIC | Facility: CLINIC | Age: 71
End: 2021-08-05
Payer: COMMERCIAL

## 2021-08-05 VITALS
HEART RATE: 63 BPM | SYSTOLIC BLOOD PRESSURE: 116 MMHG | WEIGHT: 217 LBS | DIASTOLIC BLOOD PRESSURE: 74 MMHG | OXYGEN SATURATION: 97 % | TEMPERATURE: 97.9 F | HEIGHT: 72 IN | BODY MASS INDEX: 29.39 KG/M2

## 2021-08-05 DIAGNOSIS — R73.01 IMPAIRED FASTING GLUCOSE: ICD-10-CM

## 2021-08-05 DIAGNOSIS — E78.2 MIXED HYPERLIPIDEMIA: ICD-10-CM

## 2021-08-05 DIAGNOSIS — I10 BENIGN HYPERTENSION: Primary | ICD-10-CM

## 2021-08-05 LAB — SL AMB POCT HEMOGLOBIN AIC: 6 (ref ?–6.5)

## 2021-08-05 PROCEDURE — 1160F RVW MEDS BY RX/DR IN RCRD: CPT | Performed by: INTERNAL MEDICINE

## 2021-08-05 PROCEDURE — 1036F TOBACCO NON-USER: CPT | Performed by: INTERNAL MEDICINE

## 2021-08-05 PROCEDURE — 3008F BODY MASS INDEX DOCD: CPT | Performed by: INTERNAL MEDICINE

## 2021-08-05 PROCEDURE — 3074F SYST BP LT 130 MM HG: CPT | Performed by: INTERNAL MEDICINE

## 2021-08-05 PROCEDURE — 3725F SCREEN DEPRESSION PERFORMED: CPT | Performed by: INTERNAL MEDICINE

## 2021-08-05 PROCEDURE — 3078F DIAST BP <80 MM HG: CPT | Performed by: INTERNAL MEDICINE

## 2021-08-05 PROCEDURE — 83036 HEMOGLOBIN GLYCOSYLATED A1C: CPT | Performed by: INTERNAL MEDICINE

## 2021-08-05 PROCEDURE — 99214 OFFICE O/P EST MOD 30 MIN: CPT | Performed by: INTERNAL MEDICINE

## 2021-08-05 NOTE — PROGRESS NOTES
Assessment/Plan:       Chronic problems appear stable although we discussed trying an increase in his fluoxetine  He is going to discuss this with his wife and then get back to me  Continue other medications for now  Keep working on diet  Ordered labs for next visit  Quality Measures:       Return in about 4 months (around 12/5/2021)  No problem-specific Assessment & Plan notes found for this encounter  Diagnoses and all orders for this visit:    Benign hypertension  -     Comprehensive metabolic panel; Future  -     CBC and differential; Future  -     Lipid panel; Future    Mixed hyperlipidemia    Impaired fasting glucose  -     Hemoglobin A1C; Future  -     POCT hemoglobin A1c          Subjective:      Patient ID: Tolu Shepherd is a 78 y o  male  Patient comes in today for routine follow-up  His labs show improvement in his sugar and cholesterol  His weight is down  Blood pressure is down  But he reports that work is more stressful  Just like everyone else they are short staffed and whoever is left is being worked to the ground  So he thinks his depression might be a little worse  But he is not sure he wants to adjust his medicine yet        ALLERGIES:  Allergies   Allergen Reactions    Amlodipine Swelling    Lisinopril      Other reaction(s): Cough  Other reaction(s): Cough       CURRENT MEDICATIONS:    Current Outpatient Medications:     aspirin 81 MG tablet, Take 81 mg by mouth daily, Disp: , Rfl:     atenolol (TENORMIN) 50 mg tablet, TAKE 1 TABLET DAILY, Disp: 90 tablet, Rfl: 3    Cholecalciferol (VITAMIN D3) 2000 UNITS capsule, Take by mouth, Disp: , Rfl:     esomeprazole (NexIUM) 20 mg capsule, Take 20 mg by mouth every morning before breakfast, Disp: , Rfl:     FLUoxetine (PROzac) 10 mg capsule, TAKE 1 CAPSULE DAILY, Disp: 90 capsule, Rfl: 3    olmesartan-hydrochlorothiazide (BENICAR HCT) 20-12 5 MG per tablet, TAKE 1 TABLET DAILY, Disp: 90 tablet, Rfl: 3    simvastatin (ZOCOR) 20 mg tablet, TAKE 1 TABLET DAILY, Disp: 90 tablet, Rfl: 1    tamsulosin (FLOMAX) 0 4 mg, TAKE 1 CAPSULE BY MOUTH EVERY DAY WITH DINNER, Disp: 30 capsule, Rfl: 2    ACTIVE PROBLEM LIST:  Patient Active Problem List   Diagnosis    Complete tear of left rotator cuff    Biceps tendinitis    Benign hypertension    Allergic rhinitis    Hyperlipidemia    Depression    Impaired fasting glucose    Vitamin D deficiency    Bilateral primary osteoarthritis of knee       PAST MEDICAL HISTORY:  Past Medical History:   Diagnosis Date    Hyperlipidemia     Hypertension        PAST SURGICAL HISTORY:  Past Surgical History:   Procedure Laterality Date    ABDOMINAL SURGERY      KNEE ARTHROSCOPY      NASAL SINUS SURGERY      [place] ARTHROSCOPY SHOULDER SURGICAL BICEPS TENODESIS Right 1/25/2017    Procedure: ARTHROSCOPIC BICEPS TENODESIS;  Surgeon: Damián Pope MD;  [facility];  Service: Orthopedics    [place] SHLDR ARTHROSCOP,SURG,W/ROTAT CUFF REPR Right 1/25/2017    Procedure: SHOULDER ARTHROSCOPY ROTATOR CUFF REPAIR ;  Surgeon: Damián Pope MD;  [facility];  Service: Orthopedics    [place] SURGICAL ARTHROSCOPY Carlos Alberto Cardona DBRDMT 3+ Right 1/25/2017    Procedure: ARTHROSCOPY SHOULDER;  Surgeon: Damián Pope MD;  [facility];  Service: Orthopedics    TONSILLECTOMY         FAMILY HISTORY:  Family History   Problem Relation Age of Onset    Cancer Father     Heart disease Father     Hyperlipidemia Father     Hypertension Father     Brain cancer Father     Migraines Mother        SOCIAL HISTORY:  Social History     Socioeconomic History    Marital status: /Civil Union     Spouse name: Not on file    Number of children: Not on file    Years of education: Not on file    Highest education level: Not on file   Occupational History    Occupation: Part time   Tobacco Use    Smoking status: Former Smoker    Smokeless tobacco: Never Used   Vaping Use    Vaping Use: Never used   Substance and Sexual Activity    Alcohol use: Yes     Comment: social    Drug use: No    Sexual activity: Not on file     Comment: Denied high risk sexual behavior   Other Topics Concern    Not on file   Social History Narrative    Not on file     Social Determinants of Health     Financial Resource Strain:     Difficulty of Paying Living Expenses:    Food Insecurity:     Worried About Running Out of Food in the Last Year:     [address] in the Last Year:    Transportation Needs:     Lack of Transportation (Medical):  Lack of Transportation (Non-Medical):    Physical Activity:     Days of Exercise per Week:     Minutes of Exercise per Session:    Stress:     Feeling of Stress :    Social Connections:     Frequency of Communication with Friends and Family:     Frequency of Social Gatherings with Friends and Family:     Attends Latter day Services:     Active Member of Clubs or Organizations:     Attends Club or Organization Meetings:     Marital Status:    Intimate Partner Violence:     Fear of Current or Ex-Partner:     Emotionally Abused:     Physically Abused:     Sexually Abused:        Review of Systems   Respiratory: Negative for shortness of breath  Cardiovascular: Negative for chest pain  Gastrointestinal: Negative for abdominal pain  Objective:  Vitals:    08/05/21 0833   BP: 116/74   BP Location: Left arm   Patient Position: Sitting   Cuff Size: Adult   Pulse: 63   Temp: 97 9 °F (36 6 °C)   TempSrc: Tympanic   SpO2: 97%   Weight: 98 4 kg (217 lb)   Height: 6' (1 829 m)     Body mass index is 29 43 kg/m²  Physical Exam  Vitals and nursing note reviewed  Constitutional:       Appearance: He is well-developed  Cardiovascular:      Rate and Rhythm: Normal rate and regular rhythm  Heart sounds: Normal heart sounds  Pulmonary:      Effort: Pulmonary effort is normal       Breath sounds: Normal breath sounds     Abdominal:      Palpations: Abdomen is soft  Tenderness: There is no abdominal tenderness  Neurological:      Mental Status: He is alert and oriented to person, place, and time  RESULTS:    Recent Results (from the past 1008 hour(s))   POCT hemoglobin A1c    Collection Time: 08/05/21  9:00 AM   Result Value Ref Range    Hemoglobin A1C 6 0 6 5       This note was created with voice recognition software  Phonic, grammatical and spelling errors may be present within the note as a result

## 2021-08-16 ENCOUNTER — TELEPHONE (OUTPATIENT)
Dept: INTERNAL MEDICINE CLINIC | Facility: CLINIC | Age: 71
End: 2021-08-16

## 2021-08-16 DIAGNOSIS — F32.89 OTHER DEPRESSION: ICD-10-CM

## 2021-08-16 RX ORDER — FLUOXETINE HYDROCHLORIDE 20 MG/1
20 CAPSULE ORAL DAILY
Qty: 90 CAPSULE | Refills: 1 | Status: SHIPPED | OUTPATIENT
Start: 2021-08-16 | End: 2022-02-14

## 2021-08-16 NOTE — TELEPHONE ENCOUNTER
----- Message from Sean Contreras sent at 8/16/2021  9:24 AM EDT -----  Regarding: FW: Prescription Question  Contact: [phone]    ----- Message -----  From: Romana Jordan  Sent: 8/16/2021   9:19 AM EDT  To: [facility]  Subject: Prescription Question                            Following up on my last visit I would like to request that you increase my dosage of fluoxitine  Thanks

## 2021-08-18 ENCOUNTER — OFFICE VISIT (OUTPATIENT)
Dept: INTERNAL MEDICINE CLINIC | Facility: CLINIC | Age: 71
End: 2021-08-18
Payer: COMMERCIAL

## 2021-08-18 VITALS
BODY MASS INDEX: 29.8 KG/M2 | HEART RATE: 57 BPM | WEIGHT: 220 LBS | SYSTOLIC BLOOD PRESSURE: 126 MMHG | HEIGHT: 72 IN | OXYGEN SATURATION: 96 % | DIASTOLIC BLOOD PRESSURE: 80 MMHG | TEMPERATURE: 98.6 F | RESPIRATION RATE: 16 BRPM

## 2021-08-18 DIAGNOSIS — I80.9 THROMBOPHLEBITIS: Primary | ICD-10-CM

## 2021-08-18 PROCEDURE — 3074F SYST BP LT 130 MM HG: CPT | Performed by: INTERNAL MEDICINE

## 2021-08-18 PROCEDURE — 3079F DIAST BP 80-89 MM HG: CPT | Performed by: INTERNAL MEDICINE

## 2021-08-18 PROCEDURE — 3008F BODY MASS INDEX DOCD: CPT | Performed by: INTERNAL MEDICINE

## 2021-08-18 PROCEDURE — 1160F RVW MEDS BY RX/DR IN RCRD: CPT | Performed by: INTERNAL MEDICINE

## 2021-08-18 PROCEDURE — 99213 OFFICE O/P EST LOW 20 MIN: CPT | Performed by: INTERNAL MEDICINE

## 2021-08-18 NOTE — PROGRESS NOTES
Assessment/Plan:       Suggested warm compresses a few times a day  Watch for redness and let us know if it seems to worsen     Quality Measures:       No follow-ups on file  No problem-specific Assessment & Plan notes found for this encounter  Diagnoses and all orders for this visit:    Thrombophlebitis          Subjective:      Patient ID: Zayra Byers is a 73 y o  male  Patient comes in today because he had a pain in his thigh and feels a cord in the back of it  A little tender  No fever  No redness  Just wanted to get it checked        ALLERGIES:  Allergies   Allergen Reactions    Amlodipine Swelling    Lisinopril      Other reaction(s): Cough  Other reaction(s): Cough       CURRENT MEDICATIONS:    Current Outpatient Medications:     aspirin 81 MG tablet, Take 81 mg by mouth daily, Disp: , Rfl:     atenolol (TENORMIN) 50 mg tablet, TAKE 1 TABLET DAILY, Disp: 90 tablet, Rfl: 3    Cholecalciferol (VITAMIN D3) 2000 UNITS capsule, Take by mouth, Disp: , Rfl:     esomeprazole (NexIUM) 20 mg capsule, Take 20 mg by mouth every morning before breakfast, Disp: , Rfl:     FLUoxetine (PROzac) 20 mg capsule, Take 1 capsule (20 mg total) by mouth daily, Disp: 90 capsule, Rfl: 1    olmesartan-hydrochlorothiazide (BENICAR HCT) 20-12 5 MG per tablet, TAKE 1 TABLET DAILY, Disp: 90 tablet, Rfl: 3    simvastatin (ZOCOR) 20 mg tablet, TAKE 1 TABLET DAILY, Disp: 90 tablet, Rfl: 1    tamsulosin (FLOMAX) 0 4 mg, TAKE 1 CAPSULE BY MOUTH EVERY DAY WITH DINNER, Disp: 30 capsule, Rfl: 2    ACTIVE PROBLEM LIST:  Patient Active Problem List   Diagnosis    Complete tear of left rotator cuff    Biceps tendinitis    Benign hypertension    Allergic rhinitis    Hyperlipidemia    Depression    Impaired fasting glucose    Vitamin D deficiency    Bilateral primary osteoarthritis of knee       PAST MEDICAL HISTORY:  Past Medical History:   Diagnosis Date    Hyperlipidemia     Hypertension        PAST SURGICAL HISTORY:  Past Surgical History:   Procedure Laterality Date    ABDOMINAL SURGERY      KNEE ARTHROSCOPY      NASAL SINUS SURGERY      [place] ARTHROSCOPY SHOULDER SURGICAL BICEPS TENODESIS Right 1/25/2017    Procedure: ARTHROSCOPIC BICEPS TENODESIS;  Surgeon: Denise Perez MD;  [facility];  Service: Orthopedics    [place] SHLDR ARTHROSCOP,SURG,W/ROTAT CUFF REPR Right 1/25/2017    Procedure: SHOULDER ARTHROSCOPY ROTATOR CUFF REPAIR ;  Surgeon: Denise Perez MD;  [facility];  Service: Orthopedics    NJ SURGICAL ARTHROSCOPY Viri Bitter DBRDMT 3+ Right 1/25/2017    Procedure: ARTHROSCOPY SHOULDER;  Surgeon: Denise Perez MD;  [facility];  Service: Orthopedics    TONSILLECTOMY         FAMILY HISTORY:  Family History   Problem Relation Age of Onset    Cancer Father     Heart disease Father     Hyperlipidemia Father     Hypertension Father     Brain cancer Father     Migraines Mother        SOCIAL HISTORY:  Social History     Socioeconomic History    Marital status: /Civil Union     Spouse name: Not on file    Number of children: Not on file    Years of education: Not on file    Highest education level: Not on file   Occupational History    Occupation: Part time   Tobacco Use    Smoking status: Former Smoker    Smokeless tobacco: Never Used   Vaping Use    Vaping Use: Never used   Substance and Sexual Activity    Alcohol use: Yes     Comment: social    Drug use: No    Sexual activity: Not on file     Comment: Denied high risk sexual behavior   Other Topics Concern    Not on file   Social History Narrative    Not on file     Social Determinants of Health     Financial Resource Strain:     Difficulty of Paying Living Expenses:    Food Insecurity:     Worried About Running Out of Food in the Last Year:     [address] in the Last Year:    Transportation Needs:     Lack of Transportation (Medical):      Lack of Transportation (Non-Medical):    Physical Activity:     Days of Exercise per Week:     Minutes of Exercise per Session:    Stress:     Feeling of Stress :    Social Connections:     Frequency of Communication with Friends and Family:     Frequency of Social Gatherings with Friends and Family:     Attends [employer] Services:     Active Member of Clubs or Organizations:     Attends Club or Organization Meetings:     Marital Status:    Intimate Partner Violence:     Fear of Current or Ex-Partner:     Emotionally Abused:     Physically Abused:     Sexually Abused:        Review of Systems   Constitutional: Negative for fever  Objective:  Vitals:    08/18/21 1358   BP: 126/80   Pulse: 57   Resp: 16   Temp: 98 6 °F (37 °C)   SpO2: 96%   Weight: 99 8 kg (220 lb)   Height: 6' (1 829 m)     Body mass index is 29 84 kg/m²  Physical Exam  Vitals and nursing note reviewed  Constitutional:       Appearance: Normal appearance  He is not ill-appearing  Musculoskeletal:      Comments: There is a thrombosed varicose vein on the posterior medial left thigh  No erythema  Neurological:      Mental Status: He is alert  RESULTS:    Recent Results (from the past 1008 hour(s))   POCT hemoglobin A1c    Collection Time: 08/05/21  9:00 AM   Result Value Ref Range    Hemoglobin A1C 6 0 6 5       This note was created with voice recognition software  Phonic, grammatical and spelling errors may be present within the note as a result

## 2021-09-14 ENCOUNTER — OFFICE VISIT (OUTPATIENT)
Dept: OBGYN CLINIC | Facility: CLINIC | Age: 71
End: 2021-09-14
Payer: COMMERCIAL

## 2021-09-14 VITALS
BODY MASS INDEX: 30.35 KG/M2 | SYSTOLIC BLOOD PRESSURE: 145 MMHG | DIASTOLIC BLOOD PRESSURE: 71 MMHG | HEART RATE: 63 BPM | HEIGHT: 72 IN | WEIGHT: 224.1 LBS

## 2021-09-14 DIAGNOSIS — M17.0 BILATERAL PRIMARY OSTEOARTHRITIS OF KNEE: Primary | ICD-10-CM

## 2021-09-14 PROCEDURE — 3008F BODY MASS INDEX DOCD: CPT | Performed by: ORTHOPAEDIC SURGERY

## 2021-09-14 PROCEDURE — 3077F SYST BP >= 140 MM HG: CPT | Performed by: ORTHOPAEDIC SURGERY

## 2021-09-14 PROCEDURE — 1036F TOBACCO NON-USER: CPT | Performed by: ORTHOPAEDIC SURGERY

## 2021-09-14 PROCEDURE — 99213 OFFICE O/P EST LOW 20 MIN: CPT | Performed by: ORTHOPAEDIC SURGERY

## 2021-09-14 PROCEDURE — 3078F DIAST BP <80 MM HG: CPT | Performed by: ORTHOPAEDIC SURGERY

## 2021-09-14 PROCEDURE — 20610 DRAIN/INJ JOINT/BURSA W/O US: CPT | Performed by: ORTHOPAEDIC SURGERY

## 2021-09-14 RX ORDER — BUPIVACAINE HYDROCHLORIDE 2.5 MG/ML
2 INJECTION, SOLUTION INFILTRATION; PERINEURAL
Status: COMPLETED | OUTPATIENT
Start: 2021-09-14 | End: 2021-09-14

## 2021-09-14 RX ORDER — METHYLPREDNISOLONE ACETATE 40 MG/ML
2 INJECTION, SUSPENSION INTRA-ARTICULAR; INTRALESIONAL; INTRAMUSCULAR; SOFT TISSUE
Status: COMPLETED | OUTPATIENT
Start: 2021-09-14 | End: 2021-09-14

## 2021-09-14 RX ADMIN — METHYLPREDNISOLONE ACETATE 2 ML: 40 INJECTION, SUSPENSION INTRA-ARTICULAR; INTRALESIONAL; INTRAMUSCULAR; SOFT TISSUE at 15:58

## 2021-09-14 RX ADMIN — BUPIVACAINE HYDROCHLORIDE 2 ML: 2.5 INJECTION, SOLUTION INFILTRATION; PERINEURAL at 15:58

## 2021-09-14 NOTE — PROGRESS NOTES
Orthopaedics Office Visit - Follow up Patient Visit    ASSESSMENT/PLAN:    Assessment:   Bilateral knee osteoarthritis     Plan:   · Bilateral knee CSI was performed in the office without complication   · Continue OTC pain medication on an as needed basis   · Discussed [place] declined at this time   · May use Voltaren Gel, up to 4x a day   · CSI's can continued to be repeated every 3 months, as needed   · Follow up in 3 months time for re-evaluation     To Do Next Visit:  Re-evaluation     _____________________________________________________  CHIEF COMPLAINT:  Chief Complaint   Patient presents with    Left Knee - Follow-up         SUBJECTIVE:  Dane Griggs is a 73 y o  male who presents to the office today for a follow up regarding bilateral knee pain secondary to osteoarthritis  Weston Pastor was seen in the office on 6/1/21, at which time he underwent bilateral knee CSI's  He states that the CSI's were beneficial for him until aprox  1-1 5 weeks ago when he began to experience knee pain, left worse then right  He is interested in repeat CSI's today  Weston Pastor will take Aleve on an as needed basis       PAST MEDICAL HISTORY:  Past Medical History:   Diagnosis Date    Hyperlipidemia     Hypertension        PAST SURGICAL HISTORY:  Past Surgical History:   Procedure Laterality Date    ABDOMINAL SURGERY      KNEE ARTHROSCOPY      NASAL SINUS SURGERY      [place] ARTHROSCOPY SHOULDER SURGICAL BICEPS TENODESIS Right 1/25/2017    Procedure: ARTHROSCOPIC BICEPS TENODESIS;  Surgeon: Stacie Mo MD;  [facility];  Service: Orthopedics    [place] SHLDR ARTHROSCOP,SURG,W/ROTAT CUFF REPR Right 1/25/2017    Procedure: SHOULDER ARTHROSCOPY ROTATOR CUFF REPAIR ;  Surgeon: Stacie Mo MD;  [facility];  Service: Orthopedics    AR SURGICAL ARTHROSCOPY Radu Mayen DBRDMT 3+ Right 1/25/2017    Procedure: ARTHROSCOPY SHOULDER;  Surgeon: Stacie Mo MD;  [facility];  Service: Orthopedics    TONSILLECTOMY         FAMILY HISTORY:  Family History   Problem Relation Age of Onset    Cancer Father     Heart disease Father     Hyperlipidemia Father     Hypertension Father     Brain cancer Father     Migraines Mother        SOCIAL HISTORY:  Social History     Tobacco Use    Smoking status: Former Smoker    Smokeless tobacco: Never Used   Vaping Use    Vaping Use: Never used   Substance Use Topics    Alcohol use: Yes     Comment: social    Drug use: No       MEDICATIONS:    Current Outpatient Medications:     aspirin 81 MG tablet, Take 81 mg by mouth daily, Disp: , Rfl:     atenolol (TENORMIN) 50 mg tablet, TAKE 1 TABLET DAILY, Disp: 90 tablet, Rfl: 3    Cholecalciferol (VITAMIN D3) 2000 UNITS capsule, Take by mouth, Disp: , Rfl:     esomeprazole (NexIUM) 20 mg capsule, Take 20 mg by mouth every morning before breakfast, Disp: , Rfl:     FLUoxetine (PROzac) 20 mg capsule, Take 1 capsule (20 mg total) by mouth daily, Disp: 90 capsule, Rfl: 1    olmesartan-hydrochlorothiazide (BENICAR HCT) 20-12 5 MG per tablet, TAKE 1 TABLET DAILY, Disp: 90 tablet, Rfl: 3    simvastatin (ZOCOR) 20 mg tablet, TAKE 1 TABLET DAILY, Disp: 90 tablet, Rfl: 1    tamsulosin (FLOMAX) 0 4 mg, TAKE 1 CAPSULE BY MOUTH EVERY DAY WITH DINNER, Disp: 30 capsule, Rfl: 2    ALLERGIES:  Allergies   Allergen Reactions    Amlodipine Swelling    Lisinopril      Other reaction(s): Cough  Other reaction(s): Cough       REVIEW OF SYSTEMS:  MSK: as noted in HPI   Neuro: WNL  Pertinent items are otherwise noted in HPI  A comprehensive review of systems was otherwise negative      LABS:  HgA1c:   Lab Results   Component Value Date    HGBA1C 6 0 08/05/2021     BMP:   Lab Results   Component Value Date    GLUCOSE 95 10/09/2015    CALCIUM 9 1 02/20/2021     10/09/2015    K 4 9 02/20/2021    CO2 30 02/20/2021     02/20/2021    BUN 23 02/20/2021    CREATININE 1 15 02/20/2021     CBC: No components found for: CBC    _____________________________________________________  PHYSICAL EXAMINATION:  Vital signs: /71   Pulse 63   Ht 6' (1 829 m)   Wt 102 kg (224 lb 1 6 oz)   BMI 30 39 kg/m²   General: No acute distress, awake and alert  Psychiatric: Mood and affect appear appropriate  HEENT: Trachea Midline, No torticollis, no apparent facial trauma  Cardiovascular: No audible murmurs; Extremities appear perfused  Pulmonary: No audible wheezing or stridor  Skin: No open lesions; see further details (if any) below    MUSCULOSKELETAL EXAMINATION:    Extremities:  Bilateral lower    No erythema, ecchymosis or edema  Full knee ROM   No effusion noted   Non tender to palpation  Skin warm and well perfused   Ankle edema noted      _____________________________________________________  STUDIES REVIEWED:  I personally reviewed the images and interpretation is as follows: No new imaging to review       PROCEDURES PERFORMED:  Large joint arthrocentesis: L knee  Universal Protocol:  Consent: Verbal consent obtained  Written consent not obtained  Risks and benefits: risks, benefits and alternatives were discussed  Consent given by: patient  Time out: Immediately prior to procedure a "time out" was called to verify the correct patient, procedure, equipment, support staff and site/side marked as required  Site marked: the operative site was marked  Patient identity confirmed: verbally with patient    Supporting Documentation  Indications: pain   Procedure Details  Location: knee - L knee  Preparation: Patient was prepped and draped in the usual sterile fashion  Needle size: 22 G  Ultrasound guidance: no  Medications administered: 2 mL bupivacaine 0 25 %; 2 mL methylPREDNISolone acetate 40 mg/mL    Patient tolerance: patient tolerated the procedure well with no immediate complications  Dressing:  Sterile dressing applied    Large joint arthrocentesis: R knee  Universal Protocol:  Consent: Verbal consent obtained   Written consent not obtained    Risks and benefits: risks, benefits and alternatives were discussed  Consent given by: patient  Site marked: the operative site was marked  Patient identity confirmed: verbally with patient    Supporting Documentation  Indications: pain   Procedure Details  Location: knee - R knee  Preparation: Patient was prepped and draped in the usual sterile fashion  Needle size: 22 G  Ultrasound guidance: no  Medications administered: 2 mL bupivacaine 0 25 %; 2 mL methylPREDNISolone acetate 40 mg/mL    Patient tolerance: patient tolerated the procedure well with no immediate complications  Dressing:  Sterile dressing applied        Scribe Attestation    I,:  Elfego Swan am acting as a scribe while in the presence of the attending physician :       I,:  Mack Lewis MD personally performed the services described in this documentation    as scribed in my presence :

## 2021-10-05 ENCOUNTER — TELEPHONE (OUTPATIENT)
Dept: DERMATOLOGY | Facility: CLINIC | Age: 71
End: 2021-10-05

## 2021-10-06 ENCOUNTER — OFFICE VISIT (OUTPATIENT)
Dept: INTERNAL MEDICINE CLINIC | Facility: CLINIC | Age: 71
End: 2021-10-06
Payer: COMMERCIAL

## 2021-10-06 VITALS
OXYGEN SATURATION: 96 % | BODY MASS INDEX: 29.17 KG/M2 | HEART RATE: 72 BPM | TEMPERATURE: 98 F | WEIGHT: 215.4 LBS | SYSTOLIC BLOOD PRESSURE: 130 MMHG | DIASTOLIC BLOOD PRESSURE: 62 MMHG | HEIGHT: 72 IN

## 2021-10-06 DIAGNOSIS — R21 RASH: ICD-10-CM

## 2021-10-06 DIAGNOSIS — D22.9 ATYPICAL MOLE: Primary | ICD-10-CM

## 2021-10-06 PROCEDURE — 1160F RVW MEDS BY RX/DR IN RCRD: CPT | Performed by: INTERNAL MEDICINE

## 2021-10-06 PROCEDURE — 1036F TOBACCO NON-USER: CPT | Performed by: INTERNAL MEDICINE

## 2021-10-06 PROCEDURE — 3008F BODY MASS INDEX DOCD: CPT | Performed by: INTERNAL MEDICINE

## 2021-10-06 PROCEDURE — 99214 OFFICE O/P EST MOD 30 MIN: CPT | Performed by: INTERNAL MEDICINE

## 2021-10-06 PROCEDURE — 3075F SYST BP GE 130 - 139MM HG: CPT | Performed by: INTERNAL MEDICINE

## 2021-10-06 PROCEDURE — 3078F DIAST BP <80 MM HG: CPT | Performed by: INTERNAL MEDICINE

## 2021-10-06 RX ORDER — TRIAMCINOLONE ACETONIDE 5 MG/G
CREAM TOPICAL 2 TIMES DAILY
Qty: 30 G | Refills: 0 | Status: SHIPPED | OUTPATIENT
Start: 2021-10-06 | End: 2022-03-23 | Stop reason: ALTCHOICE

## 2021-10-25 DIAGNOSIS — R35.0 URINARY FREQUENCY: ICD-10-CM

## 2021-10-25 RX ORDER — TAMSULOSIN HYDROCHLORIDE 0.4 MG/1
0.4 CAPSULE ORAL
Qty: 30 CAPSULE | Refills: 2 | Status: SHIPPED | OUTPATIENT
Start: 2021-10-25 | End: 2022-01-17

## 2021-12-04 ENCOUNTER — APPOINTMENT (OUTPATIENT)
Dept: LAB | Facility: HOSPITAL | Age: 71
End: 2021-12-04
Payer: COMMERCIAL

## 2021-12-04 DIAGNOSIS — I10 BENIGN HYPERTENSION: ICD-10-CM

## 2021-12-04 DIAGNOSIS — R73.01 IMPAIRED FASTING GLUCOSE: ICD-10-CM

## 2021-12-04 LAB
ALBUMIN SERPL BCP-MCNC: 3.4 G/DL (ref 3.5–5)
ALP SERPL-CCNC: 67 U/L (ref 46–116)
ALT SERPL W P-5'-P-CCNC: 30 U/L (ref 12–78)
ANION GAP SERPL CALCULATED.3IONS-SCNC: 6 MMOL/L (ref 4–13)
AST SERPL W P-5'-P-CCNC: 21 U/L (ref 5–45)
BASOPHILS # BLD AUTO: 0.08 THOUSANDS/ΜL (ref 0–0.1)
BASOPHILS NFR BLD AUTO: 1 % (ref 0–1)
BILIRUB SERPL-MCNC: 0.77 MG/DL (ref 0.2–1)
BUN SERPL-MCNC: 23 MG/DL (ref 5–25)
CALCIUM ALBUM COR SERPL-MCNC: 9.1 MG/DL (ref 8.3–10.1)
CALCIUM SERPL-MCNC: 8.6 MG/DL (ref 8.3–10.1)
CHLORIDE SERPL-SCNC: 106 MMOL/L (ref 100–108)
CHOLEST SERPL-MCNC: 163 MG/DL
CO2 SERPL-SCNC: 30 MMOL/L (ref 21–32)
CREAT SERPL-MCNC: 1.32 MG/DL (ref 0.6–1.3)
EOSINOPHIL # BLD AUTO: 0.25 THOUSAND/ΜL (ref 0–0.61)
EOSINOPHIL NFR BLD AUTO: 3 % (ref 0–6)
ERYTHROCYTE [DISTWIDTH] IN BLOOD BY AUTOMATED COUNT: 17.1 % (ref 11.6–15.1)
EST. AVERAGE GLUCOSE BLD GHB EST-MCNC: 126 MG/DL
GFR SERPL CREATININE-BSD FRML MDRD: 55 ML/MIN/1.73SQ M
GLUCOSE P FAST SERPL-MCNC: 129 MG/DL (ref 65–99)
HBA1C MFR BLD: 6 %
HCT VFR BLD AUTO: 43.4 % (ref 36.5–49.3)
HDLC SERPL-MCNC: 37 MG/DL
HGB BLD-MCNC: 14.8 G/DL (ref 12–17)
IMM GRANULOCYTES # BLD AUTO: 0.03 THOUSAND/UL (ref 0–0.2)
IMM GRANULOCYTES NFR BLD AUTO: 0 % (ref 0–2)
LDLC SERPL CALC-MCNC: 92 MG/DL (ref 0–100)
LYMPHOCYTES # BLD AUTO: 1.45 THOUSANDS/ΜL (ref 0.6–4.47)
LYMPHOCYTES NFR BLD AUTO: 19 % (ref 14–44)
MCH RBC QN AUTO: 33.4 PG (ref 26.8–34.3)
MCHC RBC AUTO-ENTMCNC: 34.1 G/DL (ref 31.4–37.4)
MCV RBC AUTO: 98 FL (ref 82–98)
MONOCYTES # BLD AUTO: 0.73 THOUSAND/ΜL (ref 0.17–1.22)
MONOCYTES NFR BLD AUTO: 10 % (ref 4–12)
NEUTROPHILS # BLD AUTO: 5.12 THOUSANDS/ΜL (ref 1.85–7.62)
NEUTS SEG NFR BLD AUTO: 67 % (ref 43–75)
NONHDLC SERPL-MCNC: 126 MG/DL
NRBC BLD AUTO-RTO: 0 /100 WBCS
PLATELET # BLD AUTO: 371 THOUSANDS/UL (ref 149–390)
PMV BLD AUTO: 10.8 FL (ref 8.9–12.7)
POTASSIUM SERPL-SCNC: 4.2 MMOL/L (ref 3.5–5.3)
PROT SERPL-MCNC: 6.4 G/DL (ref 6.4–8.2)
RBC # BLD AUTO: 4.43 MILLION/UL (ref 3.88–5.62)
SODIUM SERPL-SCNC: 142 MMOL/L (ref 136–145)
TRIGL SERPL-MCNC: 170 MG/DL
WBC # BLD AUTO: 7.66 THOUSAND/UL (ref 4.31–10.16)

## 2021-12-04 PROCEDURE — 36415 COLL VENOUS BLD VENIPUNCTURE: CPT

## 2021-12-04 PROCEDURE — 83036 HEMOGLOBIN GLYCOSYLATED A1C: CPT

## 2021-12-04 PROCEDURE — 85025 COMPLETE CBC W/AUTO DIFF WBC: CPT

## 2021-12-04 PROCEDURE — 80053 COMPREHEN METABOLIC PANEL: CPT

## 2021-12-04 PROCEDURE — 80061 LIPID PANEL: CPT

## 2021-12-10 ENCOUNTER — OFFICE VISIT (OUTPATIENT)
Dept: INTERNAL MEDICINE CLINIC | Facility: CLINIC | Age: 71
End: 2021-12-10
Payer: COMMERCIAL

## 2021-12-10 VITALS
HEART RATE: 61 BPM | RESPIRATION RATE: 16 BRPM | DIASTOLIC BLOOD PRESSURE: 62 MMHG | SYSTOLIC BLOOD PRESSURE: 120 MMHG | WEIGHT: 217 LBS | OXYGEN SATURATION: 99 % | HEIGHT: 72 IN | BODY MASS INDEX: 29.39 KG/M2

## 2021-12-10 DIAGNOSIS — Z23 NEED FOR INFLUENZA VACCINATION: ICD-10-CM

## 2021-12-10 DIAGNOSIS — E78.2 MIXED HYPERLIPIDEMIA: ICD-10-CM

## 2021-12-10 DIAGNOSIS — R73.01 IMPAIRED FASTING GLUCOSE: ICD-10-CM

## 2021-12-10 DIAGNOSIS — I10 BENIGN HYPERTENSION: Primary | ICD-10-CM

## 2021-12-10 DIAGNOSIS — F32.A DEPRESSION, UNSPECIFIED DEPRESSION TYPE: ICD-10-CM

## 2021-12-10 PROCEDURE — 3074F SYST BP LT 130 MM HG: CPT | Performed by: INTERNAL MEDICINE

## 2021-12-10 PROCEDURE — 90662 IIV NO PRSV INCREASED AG IM: CPT | Performed by: INTERNAL MEDICINE

## 2021-12-10 PROCEDURE — 3008F BODY MASS INDEX DOCD: CPT | Performed by: INTERNAL MEDICINE

## 2021-12-10 PROCEDURE — 1160F RVW MEDS BY RX/DR IN RCRD: CPT | Performed by: INTERNAL MEDICINE

## 2021-12-10 PROCEDURE — 1101F PT FALLS ASSESS-DOCD LE1/YR: CPT | Performed by: INTERNAL MEDICINE

## 2021-12-10 PROCEDURE — 3288F FALL RISK ASSESSMENT DOCD: CPT | Performed by: INTERNAL MEDICINE

## 2021-12-10 PROCEDURE — 90471 IMMUNIZATION ADMIN: CPT | Performed by: INTERNAL MEDICINE

## 2021-12-10 PROCEDURE — 3725F SCREEN DEPRESSION PERFORMED: CPT | Performed by: INTERNAL MEDICINE

## 2021-12-10 PROCEDURE — 99214 OFFICE O/P EST MOD 30 MIN: CPT | Performed by: INTERNAL MEDICINE

## 2021-12-10 PROCEDURE — 3078F DIAST BP <80 MM HG: CPT | Performed by: INTERNAL MEDICINE

## 2021-12-10 PROCEDURE — 1036F TOBACCO NON-USER: CPT | Performed by: INTERNAL MEDICINE

## 2022-01-04 ENCOUNTER — VBI (OUTPATIENT)
Dept: ADMINISTRATIVE | Facility: OTHER | Age: 72
End: 2022-01-04

## 2022-01-16 DIAGNOSIS — R35.0 URINARY FREQUENCY: ICD-10-CM

## 2022-01-17 RX ORDER — TAMSULOSIN HYDROCHLORIDE 0.4 MG/1
CAPSULE ORAL
Qty: 30 CAPSULE | Refills: 2 | Status: SHIPPED | OUTPATIENT
Start: 2022-01-17 | End: 2022-03-23 | Stop reason: ALTCHOICE

## 2022-01-18 ENCOUNTER — OFFICE VISIT (OUTPATIENT)
Dept: OBGYN CLINIC | Facility: CLINIC | Age: 72
End: 2022-01-18
Payer: COMMERCIAL

## 2022-01-18 VITALS
BODY MASS INDEX: 30.38 KG/M2 | HEART RATE: 50 BPM | DIASTOLIC BLOOD PRESSURE: 61 MMHG | WEIGHT: 224 LBS | SYSTOLIC BLOOD PRESSURE: 126 MMHG

## 2022-01-18 DIAGNOSIS — G89.29 CHRONIC PAIN OF LEFT KNEE: ICD-10-CM

## 2022-01-18 DIAGNOSIS — M17.12 PRIMARY OSTEOARTHRITIS OF LEFT KNEE: Primary | ICD-10-CM

## 2022-01-18 DIAGNOSIS — G89.29 CHRONIC PAIN OF RIGHT KNEE: ICD-10-CM

## 2022-01-18 DIAGNOSIS — M25.562 CHRONIC PAIN OF LEFT KNEE: ICD-10-CM

## 2022-01-18 DIAGNOSIS — M17.11 PRIMARY OSTEOARTHRITIS OF RIGHT KNEE: ICD-10-CM

## 2022-01-18 DIAGNOSIS — M25.561 CHRONIC PAIN OF RIGHT KNEE: ICD-10-CM

## 2022-01-18 PROCEDURE — 1036F TOBACCO NON-USER: CPT | Performed by: ORTHOPAEDIC SURGERY

## 2022-01-18 PROCEDURE — 20610 DRAIN/INJ JOINT/BURSA W/O US: CPT | Performed by: ORTHOPAEDIC SURGERY

## 2022-01-18 PROCEDURE — 99213 OFFICE O/P EST LOW 20 MIN: CPT | Performed by: ORTHOPAEDIC SURGERY

## 2022-01-18 PROCEDURE — 1160F RVW MEDS BY RX/DR IN RCRD: CPT | Performed by: ORTHOPAEDIC SURGERY

## 2022-01-18 PROCEDURE — 3078F DIAST BP <80 MM HG: CPT | Performed by: ORTHOPAEDIC SURGERY

## 2022-01-18 PROCEDURE — 3074F SYST BP LT 130 MM HG: CPT | Performed by: ORTHOPAEDIC SURGERY

## 2022-01-18 RX ORDER — METHYLPREDNISOLONE ACETATE 40 MG/ML
2 INJECTION, SUSPENSION INTRA-ARTICULAR; INTRALESIONAL; INTRAMUSCULAR; SOFT TISSUE
Status: COMPLETED | OUTPATIENT
Start: 2022-01-18 | End: 2022-01-18

## 2022-01-18 RX ORDER — BUPIVACAINE HYDROCHLORIDE 2.5 MG/ML
2 INJECTION, SOLUTION INFILTRATION; PERINEURAL
Status: COMPLETED | OUTPATIENT
Start: 2022-01-18 | End: 2022-01-18

## 2022-01-18 RX ADMIN — BUPIVACAINE HYDROCHLORIDE 2 ML: 2.5 INJECTION, SOLUTION INFILTRATION; PERINEURAL at 15:53

## 2022-01-18 RX ADMIN — METHYLPREDNISOLONE ACETATE 2 ML: 40 INJECTION, SUSPENSION INTRA-ARTICULAR; INTRALESIONAL; INTRAMUSCULAR; SOFT TISSUE at 15:53

## 2022-01-18 NOTE — PROGRESS NOTES
Assessment:   Diagnosis ICD-10-CM Associated Orders   1  Primary osteoarthritis of left knee  M17 12 Large joint arthrocentesis: L knee   2  Chronic pain of left knee  M25 562 Large joint arthrocentesis: L knee    G89 29    3  Primary osteoarthritis of right knee  M17 11 Large joint arthrocentesis: R knee   4  Chronic pain of right knee  M25 561 Large joint arthrocentesis: R knee    G89 29        Plan:  Diagnosis, treatment options and associated risks were discussed with the patient including no treatment, nonsurgical treatment and potential for surgical intervention  The patient was given the opportunity to ask questions regarding each  He was offered, accepted, performed injections of cortisone to both knees today for symptomatic relief  He tolerated both injections well  Ice and post injection protocol advised  Weightbearing activities as tolerated  To do next visit:  Return in about 3 months (around 4/18/2022) for re-check, or sooner if symptoms worsen or fail to improve  The above stated was discussed in layman's terms and the patient expressed understanding  All questions were answered to the patient's satisfaction  Scribe Attestation    I,:  Luis Joyner am acting as a scribe while in the presence of the attending physician :       I,:  Burke Mares MD personally performed the services described in this documentation    as scribed in my presence :             Subjective:   Jacinto Clemente is a 73 y o  male who presents today for repeat evaluation of his bilateral knees due to return of pain  He has known osteoarthritis  At his visit 3 months ago both knees were injected with cortisone with relief up until recently        Review of systems negative unless otherwise specified in HPI  Review of Systems    Past Medical History:   Diagnosis Date    Hyperlipidemia     Hypertension        Past Surgical History:   Procedure Laterality Date    ABDOMINAL SURGERY      KNEE ARTHROSCOPY      NASAL SINUS SURGERY      HI ARTHROSCOPY SHOULDER SURGICAL BICEPS TENODESIS Right 1/25/2017    Procedure: ARTHROSCOPIC BICEPS TENODESIS;  Surgeon: Gloria Barry MD;  [facility];  Service: Orthopedics    [place] SHLDR ARTHROSCOP,SURG,W/ROTAT CUFF REPR Right 1/25/2017    Procedure: SHOULDER ARTHROSCOPY ROTATOR CUFF REPAIR ;  Surgeon: Gloria Barry MD;  [facility];  Service: Orthopedics    HI SURGICAL ARTHROSCOPY Carlie Peres DBRDMT 3+ Right 1/25/2017    Procedure: ARTHROSCOPY SHOULDER;  Surgeon: Gloria Barry MD;  [facility];  Service: Orthopedics    TONSILLECTOMY         Family History   Problem Relation Age of Onset    Cancer Father     Heart disease Father     Hyperlipidemia Father     Hypertension Father     Brain cancer Father     Migraines Mother        Social History     Occupational History    Occupation: Part time   Tobacco Use    Smoking status: Former Smoker    Smokeless tobacco: Never Used   Vaping Use    Vaping Use: Never used   Substance and Sexual Activity    Alcohol use: Yes     Comment: social    Drug use: No    Sexual activity: Not on file     Comment: Denied high risk sexual behavior         Current Outpatient Medications:     aspirin 81 MG tablet, Take 81 mg by mouth daily, Disp: , Rfl:     atenolol (TENORMIN) 50 mg tablet, TAKE 1 TABLET DAILY, Disp: 90 tablet, Rfl: 3    Cholecalciferol (VITAMIN D3) 2000 UNITS capsule, Take by mouth, Disp: , Rfl:     esomeprazole (NexIUM) 20 mg capsule, Take 20 mg by mouth every morning before breakfast, Disp: , Rfl:     FLUoxetine (PROzac) 20 mg capsule, Take 1 capsule (20 mg total) by mouth daily, Disp: 90 capsule, Rfl: 1    olmesartan-hydrochlorothiazide (BENICAR HCT) 20-12 5 MG per tablet, TAKE 1 TABLET DAILY, Disp: 90 tablet, Rfl: 3    simvastatin (ZOCOR) 20 mg tablet, TAKE 1 TABLET DAILY, Disp: 90 tablet, Rfl: 3    tamsulosin (FLOMAX) 0 4 mg, TAKE 1 CAPSULE BY MOUTH EVERY DAY WITH DINNER, Disp: 30 capsule, Rfl: 2    triamcinolone (KENALOG) 0 5 % cream, Apply topically 2 (two) times a day, Disp: 30 g, Rfl: 0    Allergies   Allergen Reactions    Amlodipine Swelling    Lisinopril      Other reaction(s): Cough  Other reaction(s): Cough            Vitals:    01/18/22 1529   BP: 126/61   Pulse: (!) 50       Objective:                    Right Knee Exam     Muscle Strength   The patient has normal right knee strength  Tenderness   The patient is experiencing tenderness in the medial joint line  Range of Motion   Extension: 0   Flexion: 120     Other   Erythema: absent  Sensation: normal  Swelling: moderate  Effusion: no effusion present      Left Knee Exam     Muscle Strength   The patient has normal left knee strength  Tenderness   The patient is experiencing tenderness in the medial joint line  Range of Motion   Extension: 0   Flexion: 120     Other   Erythema: absent  Sensation: normal  Swelling: moderate  Effusion: no effusion present            Diagnostics, reviewed and taken today if performed as documented:    None performed            Procedures, if performed today:    Large joint arthrocentesis: R knee  Universal Protocol:  Consent: Verbal consent obtained  Risks and benefits: risks, benefits and alternatives were discussed  Consent given by: patient  Time out: Immediately prior to procedure a "time out" was called to verify the correct patient, procedure, equipment, support staff and site/side marked as required    Timeout called at: 1/18/2022 3:52 PM   Patient understanding: patient states understanding of the procedure being performed  Site marked: the operative site was marked  Patient identity confirmed: verbally with patient    Supporting Documentation  Indications: pain and diagnostic evaluation   Procedure Details  Location: knee - R knee  Preparation: Patient was prepped and draped in the usual sterile fashion  Needle size: 22 G  Ultrasound guidance: no  Approach: anteromedial  Medications administered: 2 mL bupivacaine 0 25 %; 2 mL methylPREDNISolone acetate 40 mg/mL    Patient tolerance: patient tolerated the procedure well with no immediate complications  Dressing:  Sterile dressing applied    Large joint arthrocentesis: L knee  Universal Protocol:  Consent: Verbal consent obtained  Risks and benefits: risks, benefits and alternatives were discussed  Consent given by: patient  Time out: Immediately prior to procedure a "time out" was called to verify the correct patient, procedure, equipment, support staff and site/side marked as required  Timeout called at: 1/18/2022 3:52 PM   Patient understanding: patient states understanding of the procedure being performed  Site marked: the operative site was marked  Patient identity confirmed: verbally with patient    Supporting Documentation  Indications: pain and diagnostic evaluation   Procedure Details  Location: knee - L knee  Preparation: Patient was prepped and draped in the usual sterile fashion  Needle size: 22 G  Ultrasound guidance: no  Approach: anteromedial  Medications administered: 2 mL bupivacaine 0 25 %; 2 mL methylPREDNISolone acetate 40 mg/mL    Patient tolerance: patient tolerated the procedure well with no immediate complications  Dressing:  Sterile dressing applied          Portions of the record may have been created with voice recognition software  Occasional wrong word or "sound a like" substitutions may have occurred due to the inherent limitations of voice recognition software  Read the chart carefully and recognize, using context, where substitutions have occurred

## 2022-02-27 ENCOUNTER — APPOINTMENT (EMERGENCY)
Dept: RADIOLOGY | Facility: HOSPITAL | Age: 72
End: 2022-02-27
Payer: COMMERCIAL

## 2022-02-27 ENCOUNTER — HOSPITAL ENCOUNTER (EMERGENCY)
Facility: HOSPITAL | Age: 72
Discharge: HOME | End: 2022-02-27
Attending: EMERGENCY MEDICINE | Admitting: EMERGENCY MEDICINE
Payer: COMMERCIAL

## 2022-02-27 VITALS
RESPIRATION RATE: 18 BRPM | DIASTOLIC BLOOD PRESSURE: 74 MMHG | SYSTOLIC BLOOD PRESSURE: 133 MMHG | OXYGEN SATURATION: 96 % | HEART RATE: 57 BPM | TEMPERATURE: 98.1 F

## 2022-02-27 DIAGNOSIS — M25.512 ACUTE PAIN OF LEFT SHOULDER: Primary | ICD-10-CM
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
## 2022-02-27 PROCEDURE — 73030 X-RAY EXAM OF SHOULDER: CPT

## 2022-02-27 PROCEDURE — 96372 THER/PROPH/DIAG INJ SC/IM: CPT

## 2022-02-27 PROCEDURE — 99284 EMERGENCY DEPT VISIT MOD MDM: CPT | Performed by: PHYSICIAN ASSISTANT

## 2022-02-27 PROCEDURE — 99283 EMERGENCY DEPT VISIT LOW MDM: CPT

## 2022-02-27 RX ORDER — NAPROXEN 500 MG/1
500 TABLET ORAL 2 TIMES DAILY WITH MEALS
Qty: 30 TABLET | Refills: 0 | Status: SHIPPED | OUTPATIENT
Start: 2022-02-27 | End: 2022-03-15

## 2022-02-27 RX ORDER — KETOROLAC TROMETHAMINE 30 MG/ML
15 INJECTION, SOLUTION INTRAMUSCULAR; INTRAVENOUS ONCE
Status: COMPLETED | OUTPATIENT
Start: 2022-02-27 | End: 2022-02-27

## 2022-02-27 RX ORDER — METHOCARBAMOL 500 MG/1
500 TABLET, FILM COATED ORAL 2 TIMES DAILY
Qty: 20 TABLET | Refills: 0 | Status: SHIPPED | OUTPATIENT
Start: 2022-02-27 | End: 2022-03-15

## 2022-02-27 RX ORDER — METHOCARBAMOL 500 MG/1
500 TABLET, FILM COATED ORAL ONCE
Status: COMPLETED | OUTPATIENT
Start: 2022-02-27 | End: 2022-02-27

## 2022-02-27 RX ADMIN — METHOCARBAMOL 500 MG: 500 TABLET ORAL at 22:18

## 2022-02-27 RX ADMIN — KETOROLAC TROMETHAMINE 15 MG: 30 INJECTION, SOLUTION INTRAMUSCULAR at 22:17

## 2022-02-27 NOTE — Clinical Note
Jyarachna Hannahhitesh was seen and treated in our emergency department on 2/27/2022  Diagnosis:     Purvi Issa  may return to work on return date  He may return on this date: 03/03/2022         If you have any questions or concerns, please don't hesitate to call        Marjan Anderson PA-C    ______________________________           _______________          _______________  Hospital Representative                              Date                                Time

## 2022-02-28 ENCOUNTER — OFFICE VISIT (OUTPATIENT)
Dept: OBGYN CLINIC | Facility: CLINIC | Age: 72
End: 2022-02-28
Payer: COMMERCIAL

## 2022-02-28 VITALS
WEIGHT: 212.8 LBS | DIASTOLIC BLOOD PRESSURE: 82 MMHG | HEART RATE: 62 BPM | SYSTOLIC BLOOD PRESSURE: 139 MMHG | HEIGHT: 72 IN | BODY MASS INDEX: 28.82 KG/M2

## 2022-02-28 DIAGNOSIS — M24.812 INTERNAL DERANGEMENT OF LEFT SHOULDER: Primary | ICD-10-CM

## 2022-02-28 PROCEDURE — 3008F BODY MASS INDEX DOCD: CPT | Performed by: ORTHOPAEDIC SURGERY

## 2022-02-28 PROCEDURE — 99214 OFFICE O/P EST MOD 30 MIN: CPT | Performed by: ORTHOPAEDIC SURGERY

## 2022-02-28 NOTE — ED PROVIDER NOTES
History  Chief Complaint   Patient presents with    Shoulder Injury     Pt reports falling onto ice about 1 hour ago, increased L shoulder pain, daily aspirin     Patient is a 60-year-old male with a past medical history significant for hypertension, hyperlipidemia presenting to the emergency department for evaluation of left shoulder pain after a fall  Patient states that approximately 3 hours ago he slipped on the ice and fell  He landed on his left elbow, however is not reporting pain in his left shoulder  He is not having any elbow pain  No head strike, no loss of consciousness  He does take aspirin, no other blood thinners  He is reporting pain to left shoulder as well as decreased range of motion  He is not having any numbness or tingling  No other complaints at this time  Prior to Admission Medications   Prescriptions Last Dose Informant Patient Reported? Taking?    Cholecalciferol (VITAMIN D3) 2000 UNITS capsule  Self Yes No   Sig: Take by mouth   FLUoxetine (PROzac) 20 mg capsule  Self No No   Sig: TAKE 1 CAPSULE BY MOUTH EVERY DAY   aspirin 81 MG tablet  Self Yes No   Sig: Take 81 mg by mouth daily   atenolol (TENORMIN) 50 mg tablet  Self No No   Sig: TAKE 1 TABLET DAILY   esomeprazole (NexIUM) 20 mg capsule  Self Yes No   Sig: Take 20 mg by mouth every morning before breakfast   olmesartan-hydrochlorothiazide (BENICAR HCT) 20-12 5 MG per tablet  Self No No   Sig: TAKE 1 TABLET DAILY   simvastatin (ZOCOR) 20 mg tablet  Self No No   Sig: TAKE 1 TABLET DAILY   tamsulosin (FLOMAX) 0 4 mg  Self No No   Sig: TAKE 1 CAPSULE BY MOUTH EVERY DAY WITH DINNER   triamcinolone (KENALOG) 0 5 % cream  Self No No   Sig: Apply topically 2 (two) times a day      Facility-Administered Medications: None       Past Medical History:   Diagnosis Date    Hyperlipidemia     Hypertension        Past Surgical History:   Procedure Laterality Date    ABDOMINAL SURGERY      KNEE ARTHROSCOPY      NASAL SINUS SURGERY      [place] ARTHROSCOPY SHOULDER SURGICAL BICEPS TENODESIS Right 1/25/2017    Procedure: ARTHROSCOPIC BICEPS TENODESIS;  Surgeon: Marc Adkins MD;  [facility];  Service: Orthopedics    CO SHLDR ARTHROSCOP,SURG,W/ROTAT CUFF REPR Right 1/25/2017    Procedure: SHOULDER ARTHROSCOPY ROTATOR CUFF REPAIR ;  Surgeon: Marc Adkins MD;  [facility];  Service: Orthopedics    CO SURGICAL ARTHROSCOPY Rishabh Mcmahan DBRDMT 3+ Right 1/25/2017    Procedure: ARTHROSCOPY SHOULDER;  Surgeon: Marc Adkins MD;  [facility];  Service: Orthopedics    TONSILLECTOMY         Family History   Problem Relation Age of Onset    Cancer Father     Heart disease Father     Hyperlipidemia Father     Hypertension Father     Brain cancer Father     Migraines Mother      I have reviewed and agree with the history as documented  E-Cigarette/Vaping    E-Cigarette Use Never User      E-Cigarette/Vaping Substances    Nicotine No     THC No     CBD No     Flavoring No     Other No     Unknown No      Social History     Tobacco Use    Smoking status: Former Smoker    Smokeless tobacco: Never Used   Vaping Use    Vaping Use: Never used   Substance Use Topics    Alcohol use: Yes     Comment: social    Drug use: No       Review of Systems   Constitutional: Negative for chills, diaphoresis and fever  HENT: Negative for congestion, drooling, facial swelling, nosebleeds, sore throat and voice change  Eyes: Negative for discharge and redness  Respiratory: Negative for cough, choking, chest tightness, shortness of breath and stridor  Cardiovascular: Negative for chest pain and palpitations  Gastrointestinal: Negative for abdominal pain, diarrhea, nausea and vomiting  Genitourinary: Negative for decreased urine volume, difficulty urinating, dysuria, flank pain, frequency and urgency  Musculoskeletal: Positive for arthralgias (Left shoulder)   Negative for back pain, neck pain and neck stiffness  Skin: Negative for color change, rash and wound  Neurological: Negative for dizziness, syncope, facial asymmetry, weakness, light-headedness, numbness and headaches  Psychiatric/Behavioral: Negative for confusion and suicidal ideas  The patient is not nervous/anxious  All other systems reviewed and are negative  Physical Exam  Physical Exam  Vitals reviewed  Constitutional:       General: He is not in acute distress  Appearance: Normal appearance  He is normal weight  He is not ill-appearing, toxic-appearing or diaphoretic  HENT:      Head: Normocephalic and atraumatic  Right Ear: External ear normal       Left Ear: External ear normal       Mouth/Throat:      Mouth: Mucous membranes are moist       Pharynx: Oropharynx is clear  No oropharyngeal exudate or posterior oropharyngeal erythema  Eyes:      General: No scleral icterus  Right eye: No discharge  Left eye: No discharge  Extraocular Movements: Extraocular movements intact  Conjunctiva/sclera: Conjunctivae normal       Pupils: Pupils are equal, round, and reactive to light  Cardiovascular:      Rate and Rhythm: Normal rate and regular rhythm  Pulses: Normal pulses  Heart sounds: Normal heart sounds  No murmur heard  No friction rub  No gallop  Pulmonary:      Effort: Pulmonary effort is normal  No respiratory distress  Breath sounds: Normal breath sounds  No stridor  No wheezing, rhonchi or rales  Abdominal:      General: Abdomen is flat  Palpations: Abdomen is soft  Tenderness: There is no abdominal tenderness  There is no right CVA tenderness, left CVA tenderness, guarding or rebound  Musculoskeletal:      Left shoulder: No tenderness or bony tenderness  Decreased range of motion  Cervical back: Normal range of motion and neck supple  Right lower leg: No edema  Left lower leg: No edema        Comments: Patient with decreased active and passive range of motion of the left shoulder  He is neurovascularly intact distally  Skin:     General: Skin is warm and dry  Capillary Refill: Capillary refill takes less than 2 seconds  Neurological:      General: No focal deficit present  Mental Status: He is alert and oriented to person, place, and time  Psychiatric:         Mood and Affect: Mood normal          Behavior: Behavior normal          Vital Signs  ED Triage Vitals [02/27/22 1925]   Temperature Pulse Respirations Blood Pressure SpO2   98 1 °F (36 7 °C) 57 18 133/74 96 %      Temp Source Heart Rate Source Patient Position - Orthostatic VS BP Location FiO2 (%)   Oral Monitor Lying Right arm --      Pain Score       --           Vitals:    02/27/22 1925   BP: 133/74   Pulse: 57   Patient Position - Orthostatic VS: Lying         Visual Acuity      ED Medications  Medications   methocarbamol (ROBAXIN) tablet 500 mg (500 mg Oral Given 2/27/22 2218)   ketorolac (TORADOL) injection 15 mg (15 mg Intramuscular Given 2/27/22 2217)       Diagnostic Studies  Results Reviewed     None                 XR shoulder 2+ views LEFT   Final Result by Dee Dee Paniagua MD (02/28 9454)      No acute osseous abnormality  Workstation performed: [id number]                    Procedures  Procedures         ED Course                                             MDM  Number of Diagnoses or Management Options  Acute pain of left shoulder  Fall, initial encounter  Diagnosis management comments: Patient presenting for evaluation of left shoulder pain after a fall  He appears comfortable in bed  He is not any acute distress  Vital signs unremarkable  On exam, he has decreased passive and active range of motion of the left shoulder  There is no tenderness despite complaint of pain  X-ray did not reveal any acute osseous abnormality  Patient was given Toradol and Robaxin in the emergency department with adequate relief of his pain  He was placed in a sling  He was given a prescription for naproxen Robaxin to go home with  He was given instructions to follow-up with orthopedics  Strict return precautions were discussed  Patient in stable condition at time of discharge  Amount and/or Complexity of Data Reviewed  Tests in the radiology section of CPT®: ordered and reviewed    Patient Progress  Patient progress: stable      Disposition  Final diagnoses:   Acute pain of left shoulder   Fall, initial encounter     Time reflects when diagnosis was documented in both MDM as applicable and the Disposition within this note     Time User Action Codes Description Comment    2/27/2022  9:36 PM Purvi Amas Add [M25 512] Acute pain of left shoulder     2/27/2022  9:36 PM Purvi Amas Add [F73  Lavetta Cleveland, initial encounter       ED Disposition     ED Disposition Condition Date/Time Comment    Discharge Stable Sun Feb 27, 2022  9:36 PM Milderd Leyda discharge to home/self care              Follow-up Information     Follow up With Specialties Details Why Contact Info Additional 2000 [facility] Emergency Department Emergency Medicine Go to  If symptoms worsen [address] Emergency Department, 36 [place], [place], 201 [facility] Orthopedic Surgery Schedule an appointment as soon as possible for a visit  for follow up 36 [facility] [phone] [facility], [address] [address] [place], [place], [phone]          Discharge Medication List as of 2/27/2022  9:37 PM      START taking these medications    Details   methocarbamol (ROBAXIN) 500 mg tablet Take 1 tablet (500 mg total) by mouth 2 (two) times a day, Starting Sun 2/27/2022, Print      naproxen (Naprosyn) 500 mg tablet Take 1 tablet (500 mg total) by mouth 2 (two) times a day with meals, Starting Sun 2/27/2022, Print         CONTINUE these medications which have NOT CHANGED    Details   aspirin 81 MG tablet Take 81 mg by mouth daily, Until Discontinued, Historical Med      atenolol (TENORMIN) 50 mg tablet TAKE 1 TABLET DAILY, Normal      Cholecalciferol (VITAMIN D3) 2000 UNITS capsule Take by mouth, Starting Mon 6/6/2016, Historical Med      esomeprazole (NexIUM) 20 mg capsule Take 20 mg by mouth every morning before breakfast, Until Discontinued, Historical Med      FLUoxetine (PROzac) 20 mg capsule TAKE 1 CAPSULE BY MOUTH EVERY DAY, Normal      olmesartan-hydrochlorothiazide (BENICAR HCT) 20-12 5 MG per tablet TAKE 1 TABLET DAILY, Normal      simvastatin (ZOCOR) 20 mg tablet TAKE 1 TABLET DAILY, Normal      tamsulosin (FLOMAX) 0 4 mg TAKE 1 CAPSULE BY MOUTH EVERY DAY WITH DINNER, Normal      triamcinolone (KENALOG) 0 5 % cream Apply topically 2 (two) times a day, Starting Wed 10/6/2021, Normal             No discharge procedures on file      PDMP Review     None          ED Provider  Electronically Signed by           Yayo Valverde PA-C  03/13/22 1023

## 2022-02-28 NOTE — PROGRESS NOTES
Patient Name:  Soraya Douglas  MRN:  [id number]    [address] [place] [address]  Internal derangement of left shoulder  -     MRI shoulder left wo contrast; Future; Expected date: 02/28/2022        73 y o  male with Left shoulder internal derangement s/p mechanical fall  X-rays reviewed in office today with patient  In light of patients moderate weakness, decreased range of motion, will move forward with MRI of Left shoulder for evaluation of rotator cuff pathology  Advised patient to discontinue sling while at home to perform exercises to decrease stiffness; educated patient on table slides, wall walks, pendulums, elbow ROM  Verbalized understanding of the above and will follow up in office after MRI resulted  Chief Complaint     Left shoulder pain    History of the Present Illness     Soraya Douglas is a RHD 73 y o  male with Left shoulder pain s/p mechanical fall at home last night  He admits to landing on his Left elbow which pushed his shoulder upwards  He did report to the ED secondary to moderate Left shoulder pain; x-rays were taken at that time demonstrating no acute osseous abnormality, mild osteoarthritis  Today, patient admits he has been wearing a sling provided by the ED for comfort  He did not have to administer pain medication today as he kept his arm in the sling  He admits to history of Right sided rotator cuff repair performed in 2017 at 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  Denies numbness or tingling or additional musculoskeletal complaints  Review of Systems     Review of Systems    Physical Exam     /82   Pulse 62   Ht 6' (1 829 m)   Wt 96 5 kg (212 lb 12 8 oz)   BMI 28 86 kg/m²     Left Shoulder:    Active range of motion   60 degrees forward flexion  60 degrees abduction  30-40 degrees external rotation     Passive range of motion   140 degrees of forward flexion   70 degrees external rotation  There is tenderness present over the proximal biceps tendon, greater tuberosity of humerus  There is 4-/5 strength with external rotation testing at the side  Empty can testing is positive with pain and weakness  Belly press test is negative  Ayon test is positive  The patient is neurovascularly intact distally in the extremity  Eyes:  Anicteric sclerae  Neck:  Supple  Lungs:  Normal respiratory effort  Cardiovascular:  Capillary refill is less than 2 seconds  Skin:  Intact without erythema  Neurologic:  Sensation grossly intact to light touch  Psychiatric:  Mood and affect are appropriate  Data Review     I have personally reviewed pertinent films in PACS, and my interpretation follows:    X-rays taken of Left shoulder demonstrates mild glenohumeral osteoarthritis, no acute fracture or dislocation       Past Medical History:   Diagnosis Date    Hyperlipidemia     Hypertension        Past Surgical History:   Procedure Laterality Date    ABDOMINAL SURGERY      KNEE ARTHROSCOPY      NASAL SINUS SURGERY      [place] ARTHROSCOPY SHOULDER SURGICAL BICEPS TENODESIS Right 1/25/2017    Procedure: ARTHROSCOPIC BICEPS TENODESIS;  Surgeon: Sariah Hernandez MD;  [facility];  Service: Orthopedics    [place] SHLDR ARTHROSCOP,SURG,W/ROTAT CUFF REPR Right 1/25/2017    Procedure: SHOULDER ARTHROSCOPY ROTATOR CUFF REPAIR ;  Surgeon: Sariah Hernandez MD;  [facility];  Service: Orthopedics    [place] SURGICAL ARTHROSCOPY Pierre Call DBRDMT 3+ Right 1/25/2017    Procedure: ARTHROSCOPY SHOULDER;  Surgeon: Sariah Hernandez MD;  [facility];  Service: Orthopedics    TONSILLECTOMY         Allergies   Allergen Reactions    Amlodipine Swelling    Lisinopril      Other reaction(s): Cough  Other reaction(s): Cough       Current Outpatient Medications on File Prior to Visit Medication Sig Dispense Refill    aspirin 81 MG tablet Take 81 mg by mouth daily      atenolol (TENORMIN) 50 mg tablet TAKE 1 TABLET DAILY 90 tablet 3    Cholecalciferol (VITAMIN D3) 2000 UNITS capsule Take by mouth      esomeprazole (NexIUM) 20 mg capsule Take 20 mg by mouth every morning before breakfast      FLUoxetine (PROzac) 20 mg capsule TAKE 1 CAPSULE BY MOUTH EVERY DAY 90 capsule 3    methocarbamol (ROBAXIN) 500 mg tablet Take 1 tablet (500 mg total) by mouth 2 (two) times a day 20 tablet 0    naproxen (Naprosyn) 500 mg tablet Take 1 tablet (500 mg total) by mouth 2 (two) times a day with meals 30 tablet 0    olmesartan-hydrochlorothiazide (BENICAR HCT) 20-12 5 MG per tablet TAKE 1 TABLET DAILY 90 tablet 3    simvastatin (ZOCOR) 20 mg tablet TAKE 1 TABLET DAILY 90 tablet 3    tamsulosin (FLOMAX) 0 4 mg TAKE 1 CAPSULE BY MOUTH EVERY DAY WITH DINNER 30 capsule 2    triamcinolone (KENALOG) 0 5 % cream Apply topically 2 (two) times a day 30 g 0     No current facility-administered medications on file prior to visit         Social History     Tobacco Use    Smoking status: Former Smoker    Smokeless tobacco: Never Used   Vaping Use    Vaping Use: Never used   Substance Use Topics    Alcohol use: Yes     Comment: social    Drug use: No       Family History   Problem Relation Age of Onset    Cancer Father     Heart disease Father     Hyperlipidemia Father     Hypertension Father    Amina Rouleau Brain cancer Father     Migraines Mother              Procedures Performed     Procedures  None      Adal Castaneda DO

## 2022-02-28 NOTE — ED NOTES
Patient presents with pain to left arm after slip and fall     Rebecca Tena, [place]  02/27/22 1558

## 2022-02-28 NOTE — LETTER
February 28, 2022     Patient: Ole Handy   YOB: 1950   Date of Visit: 2/28/2022       To Whom it May Concern:    Ole Handy is under my professional care  He was seen in my office on 2/28/2022  He may not return to work at this time  Will follow up in office after MRI for further evaluation  If you have any questions or concerns, please don't hesitate to call           Sincerely,          Darrel Thurman DO        CC: No Recipients

## 2022-03-05 ENCOUNTER — APPOINTMENT (OUTPATIENT)
Dept: LAB | Facility: HOSPITAL | Age: 72
End: 2022-03-05
Payer: COMMERCIAL

## 2022-03-05 ENCOUNTER — HOSPITAL ENCOUNTER (OUTPATIENT)
Dept: MRI IMAGING | Facility: HOSPITAL | Age: 72
Discharge: HOME | End: 2022-03-05
Payer: COMMERCIAL

## 2022-03-05 DIAGNOSIS — R73.01 IMPAIRED FASTING GLUCOSE: ICD-10-CM

## 2022-03-05 DIAGNOSIS — M24.812 INTERNAL DERANGEMENT OF LEFT SHOULDER: ICD-10-CM

## 2022-03-05 DIAGNOSIS — I10 BENIGN HYPERTENSION: ICD-10-CM

## 2022-03-05 LAB
ALBUMIN SERPL BCP-MCNC: 3.4 G/DL (ref 3.5–5)
ALP SERPL-CCNC: 48 U/L (ref 46–116)
ALT SERPL W P-5'-P-CCNC: 38 U/L (ref 12–78)
ANION GAP SERPL CALCULATED.3IONS-SCNC: 7 MMOL/L (ref 4–13)
AST SERPL W P-5'-P-CCNC: 17 U/L (ref 5–45)
BASOPHILS # BLD AUTO: 0.07 THOUSANDS/ΜL (ref 0–0.1)
BASOPHILS NFR BLD AUTO: 1 % (ref 0–1)
BILIRUB SERPL-MCNC: 0.89 MG/DL (ref 0.2–1)
BUN SERPL-MCNC: 30 MG/DL (ref 5–25)
CALCIUM ALBUM COR SERPL-MCNC: 9.4 MG/DL (ref 8.3–10.1)
CALCIUM SERPL-MCNC: 8.9 MG/DL (ref 8.3–10.1)
CHLORIDE SERPL-SCNC: 105 MMOL/L (ref 100–108)
CHOLEST SERPL-MCNC: 189 MG/DL
CO2 SERPL-SCNC: 30 MMOL/L (ref 21–32)
CREAT SERPL-MCNC: 1.16 MG/DL (ref 0.6–1.3)
EOSINOPHIL # BLD AUTO: 0.14 THOUSAND/ΜL (ref 0–0.61)
EOSINOPHIL NFR BLD AUTO: 2 % (ref 0–6)
ERYTHROCYTE [DISTWIDTH] IN BLOOD BY AUTOMATED COUNT: 18.6 % (ref 11.6–15.1)
EST. AVERAGE GLUCOSE BLD GHB EST-MCNC: 134 MG/DL
GFR SERPL CREATININE-BSD FRML MDRD: 63 ML/MIN/1.73SQ M
GLUCOSE P FAST SERPL-MCNC: 102 MG/DL (ref 65–99)
HBA1C MFR BLD: 6.3 %
HCT VFR BLD AUTO: 44.4 % (ref 36.5–49.3)
HDLC SERPL-MCNC: 56 MG/DL
HGB BLD-MCNC: 15.4 G/DL (ref 12–17)
IMM GRANULOCYTES # BLD AUTO: 0.03 THOUSAND/UL (ref 0–0.2)
IMM GRANULOCYTES NFR BLD AUTO: 0 % (ref 0–2)
LDLC SERPL CALC-MCNC: 113 MG/DL (ref 0–100)
LYMPHOCYTES # BLD AUTO: 1.76 THOUSANDS/ΜL (ref 0.6–4.47)
LYMPHOCYTES NFR BLD AUTO: 20 % (ref 14–44)
MCH RBC QN AUTO: 31.8 PG (ref 26.8–34.3)
MCHC RBC AUTO-ENTMCNC: 34.7 G/DL (ref 31.4–37.4)
MCV RBC AUTO: 92 FL (ref 82–98)
MONOCYTES # BLD AUTO: 0.67 THOUSAND/ΜL (ref 0.17–1.22)
MONOCYTES NFR BLD AUTO: 8 % (ref 4–12)
NEUTROPHILS # BLD AUTO: 6.19 THOUSANDS/ΜL (ref 1.85–7.62)
NEUTS SEG NFR BLD AUTO: 69 % (ref 43–75)
NONHDLC SERPL-MCNC: 133 MG/DL
NRBC BLD AUTO-RTO: 0 /100 WBCS
PLATELET # BLD AUTO: 321 THOUSANDS/UL (ref 149–390)
PMV BLD AUTO: 10.3 FL (ref 8.9–12.7)
POTASSIUM SERPL-SCNC: 4.2 MMOL/L (ref 3.5–5.3)
PROT SERPL-MCNC: 6.5 G/DL (ref 6.4–8.2)
RBC # BLD AUTO: 4.84 MILLION/UL (ref 3.88–5.62)
SODIUM SERPL-SCNC: 142 MMOL/L (ref 136–145)
TRIGL SERPL-MCNC: 100 MG/DL
TSH SERPL DL<=0.05 MIU/L-ACNC: 2.66 UIU/ML (ref 0.36–3.74)
WBC # BLD AUTO: 8.86 THOUSAND/UL (ref 4.31–10.16)

## 2022-03-05 PROCEDURE — 83036 HEMOGLOBIN GLYCOSYLATED A1C: CPT

## 2022-03-05 PROCEDURE — 80061 LIPID PANEL: CPT

## 2022-03-05 PROCEDURE — 73221 MRI JOINT UPR EXTREM W/O DYE: CPT

## 2022-03-05 PROCEDURE — 80053 COMPREHEN METABOLIC PANEL: CPT

## 2022-03-05 PROCEDURE — 84443 ASSAY THYROID STIM HORMONE: CPT

## 2022-03-05 PROCEDURE — 85025 COMPLETE CBC W/AUTO DIFF WBC: CPT

## 2022-03-05 PROCEDURE — 36415 COLL VENOUS BLD VENIPUNCTURE: CPT

## 2022-03-15 ENCOUNTER — OFFICE VISIT (OUTPATIENT)
Dept: OBGYN CLINIC | Facility: CLINIC | Age: 72
End: 2022-03-15
Payer: COMMERCIAL

## 2022-03-15 VITALS
SYSTOLIC BLOOD PRESSURE: 128 MMHG | DIASTOLIC BLOOD PRESSURE: 70 MMHG | HEIGHT: 72 IN | HEART RATE: 57 BPM | BODY MASS INDEX: 28.71 KG/M2 | WEIGHT: 212 LBS

## 2022-03-15 DIAGNOSIS — S46.012D TRAUMATIC COMPLETE TEAR OF LEFT ROTATOR CUFF, SUBSEQUENT ENCOUNTER: Primary | ICD-10-CM

## 2022-03-15 PROCEDURE — 99214 OFFICE O/P EST MOD 30 MIN: CPT | Performed by: ORTHOPAEDIC SURGERY

## 2022-03-15 RX ORDER — CHLORHEXIDINE GLUCONATE 4 G/100ML
SOLUTION TOPICAL DAILY PRN
Status: CANCELLED | OUTPATIENT
Start: 2022-03-15

## 2022-03-15 RX ORDER — CHLORHEXIDINE GLUCONATE 0.12 MG/ML
15 RINSE ORAL ONCE
Status: CANCELLED | OUTPATIENT
Start: 2022-03-15 | End: 2022-03-15

## 2022-03-15 NOTE — PROGRESS NOTES
Patient Name:  Ayana Moran  MRN:  [id number]    [address] [place], [place] [address]way     1  Traumatic complete tear of left rotator cuff, subsequent encounter  -     Ambulatory referral to [facility]; Future  -     Comprehensive metabolic panel; Future  -     CBC and differential; Future  -     EKG 12 lead; Future  -     XR chest pa & lateral; Future; Expected date: 03/15/2022  -     Case request operating room: ARTHROSCOPY SHOULDER- Left shoulder arthroscopic rotator cuff repair, possible open subpectoral biceps tenodesis vs biceps tenotomy, possible acromioplasty, all associated procedures; Standing  -     Durable Medical Equipment        73 y o  male with Left shoulder rotator cuff tear involving supraspinatus and infraspinatus tendons  MRI reviewed in office today with patient  In depth conversation had with patient regarding nonoperative vs surgical management of rotator cuff tears  Nonoperative management consisting of outpatient PT, OTC oral/topical analgesics, corticosteroid injection therapy  Risks of nonoperative management include propagation of tear, atrophy and retraction  Surgical management consisting of Left shoulder arthroscopic rotator cuff repair, possible acromioplasty, all associated procedures  In light of patients MRI results, moderate pain, positive special testing, patient would like to move forward with surgical intervention  Risks of surgery discussed including infection, injury to surrounding structure, wound healing complications, failure of repair, stiffness, persistent pain, need for additional procedures and anesthesia complications  Patient verbalized understanding of the above and will go to OR with Dr Saige Mortensen likely 03/30/2022  History of the Present Illness   Ayana Moran is a 73 y o  male with Left shoulder internal derangement  MRI was ordered at last appointment secondary to significant weakness and decreased ROM s/p mechanical fall   Today, patient reports improvement of range of motion and pain since initial visit  He admits to pain if rolling onto Left side  He has been occasionally administering NSAID if moderate pain with mild pain relief  Review of Systems     Review of Systems   Constitutional: Negative for chills and fever  HENT: Negative for ear pain and sore throat  Eyes: Negative for pain and visual disturbance  Respiratory: Negative for cough and shortness of breath  Cardiovascular: Negative for chest pain and palpitations  Gastrointestinal: Negative for abdominal pain and vomiting  Genitourinary: Negative for dysuria and hematuria  Musculoskeletal: Negative for arthralgias and back pain  Skin: Negative for color change and rash  Neurological: Negative for seizures and syncope  All other systems reviewed and are negative  Physical Exam     /70   Pulse 57   Ht 6' (1 829 m)   Wt 96 2 kg (212 lb)   BMI 28 75 kg/m²     Left Shoulder: Active range of motion   130 degrees forward flexion  110 degrees abduction  50-60 degrees external rotation   L1 internal rotation    Passive range of motion  150 degrees of forward flexion   There is 4-/5 strength with external rotation testing at the side  Empty can testing is positive with pain and weakness  Belly press test is negative  Ayon test is negative   The patient is neurovascularly intact distally in the extremity  Data Review     I have personally reviewed pertinent films in PACS, and my interpretation follows  MRI performed of Left shoulder demonstrates full thickness tear of supraspinatus with tear extension posterior into infraspinatus       Social History     Tobacco Use    Smoking status: Former Smoker    Smokeless tobacco: Never Used   Vaping Use    Vaping Use: Never used   Substance Use Topics    Alcohol use: Yes     Comment: social    Drug use: No           Procedures  None     Izzy Edmondson PA-C

## 2022-03-18 ENCOUNTER — APPOINTMENT (OUTPATIENT)
Dept: LAB | Facility: HOSPITAL | Age: 72
End: 2022-03-18
Payer: COMMERCIAL

## 2022-03-18 ENCOUNTER — OFFICE VISIT (OUTPATIENT)
Dept: LAB | Facility: HOSPITAL | Age: 72
End: 2022-03-18
Payer: COMMERCIAL

## 2022-03-18 ENCOUNTER — HOSPITAL ENCOUNTER (OUTPATIENT)
Dept: RADIOLOGY | Facility: HOSPITAL | Age: 72
Discharge: HOME | End: 2022-03-18
Payer: COMMERCIAL

## 2022-03-18 DIAGNOSIS — S46.012D TRAUMATIC COMPLETE TEAR OF LEFT ROTATOR CUFF, SUBSEQUENT ENCOUNTER: ICD-10-CM

## 2022-03-18 LAB
ALBUMIN SERPL BCP-MCNC: 3.4 G/DL (ref 3.5–5)
ALP SERPL-CCNC: 53 U/L (ref 46–116)
ALT SERPL W P-5'-P-CCNC: 36 U/L (ref 12–78)
ANION GAP SERPL CALCULATED.3IONS-SCNC: 2 MMOL/L (ref 4–13)
AST SERPL W P-5'-P-CCNC: 22 U/L (ref 5–45)
BASOPHILS # BLD AUTO: 0.08 THOUSANDS/ΜL (ref 0–0.1)
BASOPHILS NFR BLD AUTO: 1 % (ref 0–1)
BILIRUB SERPL-MCNC: 0.77 MG/DL (ref 0.2–1)
BUN SERPL-MCNC: 22 MG/DL (ref 5–25)
CALCIUM ALBUM COR SERPL-MCNC: 9.1 MG/DL (ref 8.3–10.1)
CALCIUM SERPL-MCNC: 8.6 MG/DL (ref 8.3–10.1)
CHLORIDE SERPL-SCNC: 102 MMOL/L (ref 100–108)
CO2 SERPL-SCNC: 32 MMOL/L (ref 21–32)
CREAT SERPL-MCNC: 1.21 MG/DL (ref 0.6–1.3)
EOSINOPHIL # BLD AUTO: 0.14 THOUSAND/ΜL (ref 0–0.61)
EOSINOPHIL NFR BLD AUTO: 2 % (ref 0–6)
ERYTHROCYTE [DISTWIDTH] IN BLOOD BY AUTOMATED COUNT: 18.5 % (ref 11.6–15.1)
GFR SERPL CREATININE-BSD FRML MDRD: 60 ML/MIN/1.73SQ M
GLUCOSE SERPL-MCNC: 110 MG/DL (ref 65–140)
HCT VFR BLD AUTO: 41.5 % (ref 36.5–49.3)
HGB BLD-MCNC: 14.6 G/DL (ref 12–17)
IMM GRANULOCYTES # BLD AUTO: 0.04 THOUSAND/UL (ref 0–0.2)
IMM GRANULOCYTES NFR BLD AUTO: 1 % (ref 0–2)
LYMPHOCYTES # BLD AUTO: 1.67 THOUSANDS/ΜL (ref 0.6–4.47)
LYMPHOCYTES NFR BLD AUTO: 20 % (ref 14–44)
MCH RBC QN AUTO: 32.1 PG (ref 26.8–34.3)
MCHC RBC AUTO-ENTMCNC: 35.2 G/DL (ref 31.4–37.4)
MCV RBC AUTO: 91 FL (ref 82–98)
MONOCYTES # BLD AUTO: 0.71 THOUSAND/ΜL (ref 0.17–1.22)
MONOCYTES NFR BLD AUTO: 8 % (ref 4–12)
NEUTROPHILS # BLD AUTO: 5.77 THOUSANDS/ΜL (ref 1.85–7.62)
NEUTS SEG NFR BLD AUTO: 68 % (ref 43–75)
NRBC BLD AUTO-RTO: 0 /100 WBCS
PLATELET # BLD AUTO: 347 THOUSANDS/UL (ref 149–390)
PMV BLD AUTO: 10.9 FL (ref 8.9–12.7)
POTASSIUM SERPL-SCNC: 3.8 MMOL/L (ref 3.5–5.3)
PROT SERPL-MCNC: 6.4 G/DL (ref 6.4–8.2)
RBC # BLD AUTO: 4.55 MILLION/UL (ref 3.88–5.62)
SODIUM SERPL-SCNC: 136 MMOL/L (ref 136–145)
WBC # BLD AUTO: 8.41 THOUSAND/UL (ref 4.31–10.16)

## 2022-03-18 PROCEDURE — 85025 COMPLETE CBC W/AUTO DIFF WBC: CPT

## 2022-03-18 PROCEDURE — 80053 COMPREHEN METABOLIC PANEL: CPT

## 2022-03-18 PROCEDURE — 93005 ELECTROCARDIOGRAM TRACING: CPT

## 2022-03-18 PROCEDURE — 36415 COLL VENOUS BLD VENIPUNCTURE: CPT

## 2022-03-18 PROCEDURE — 71046 X-RAY EXAM CHEST 2 VIEWS: CPT

## 2022-03-19 LAB
ATRIAL RATE: 62 BPM
P AXIS: 54 DEGREES
PR INTERVAL: 184 MS
QRS AXIS: 25 DEGREES
QRSD INTERVAL: 88 MS
QT INTERVAL: 372 MS
QTC INTERVAL: 377 MS
T WAVE AXIS: 59 DEGREES
VENTRICULAR RATE: 62 BPM

## 2022-03-19 PROCEDURE — 93010 ELECTROCARDIOGRAM REPORT: CPT | Performed by: INTERNAL MEDICINE

## 2022-03-21 ENCOUNTER — TELEPHONE (OUTPATIENT)
Dept: INTERNAL MEDICINE CLINIC | Facility: CLINIC | Age: 72
End: 2022-03-21

## 2022-03-23 ENCOUNTER — OFFICE VISIT (OUTPATIENT)
Dept: INTERNAL MEDICINE CLINIC | Facility: CLINIC | Age: 72
End: 2022-03-23
Payer: COMMERCIAL

## 2022-03-23 VITALS
OXYGEN SATURATION: 95 % | SYSTOLIC BLOOD PRESSURE: 112 MMHG | WEIGHT: 216.8 LBS | HEART RATE: 65 BPM | HEIGHT: 72 IN | TEMPERATURE: 96.9 F | RESPIRATION RATE: 18 BRPM | DIASTOLIC BLOOD PRESSURE: 84 MMHG | BODY MASS INDEX: 29.36 KG/M2

## 2022-03-23 DIAGNOSIS — Z01.818 PRE-OP EXAMINATION: Primary | ICD-10-CM

## 2022-03-23 DIAGNOSIS — R93.89 ABNORMAL CXR: ICD-10-CM

## 2022-03-23 DIAGNOSIS — I10 BENIGN HYPERTENSION: ICD-10-CM

## 2022-03-23 DIAGNOSIS — M75.122 COMPLETE TEAR OF LEFT ROTATOR CUFF, UNSPECIFIED WHETHER TRAUMATIC: ICD-10-CM

## 2022-03-23 DIAGNOSIS — E78.2 MIXED HYPERLIPIDEMIA: ICD-10-CM

## 2022-03-23 PROBLEM — I87.2 CHRONIC VENOUS INSUFFICIENCY: Status: ACTIVE | Noted: 2022-03-23

## 2022-03-23 PROCEDURE — 99214 OFFICE O/P EST MOD 30 MIN: CPT | Performed by: PHYSICIAN ASSISTANT

## 2022-03-23 NOTE — TELEPHONE ENCOUNTER
As per Dr Juan A Yang, "Call pt, radiology contacted us about your preop CXR  Sudhakar Pang were wondering about a small pneumonia?  If he has any respiratory issues needs appt 1-2 days, otherwise he may keep preop appt on the 23rd "    Patient is coming for an appointment today  Will address at time of visit

## 2022-03-23 NOTE — PROGRESS NOTES
Assessment/Plan:     General medical exam is acceptable  Labs of CBC/ CMP are acceptable  EKG interpreted as normal sinus rhythm, normal   Chest x-ray interpreted as ground-glass appearance  Patient will be having a CT of the chest done on 03/24/2022, and results will be addended to this chart  Patient will take his atenolol, almost starting hydrochlorothiazide, and Nexium the morning of surgery with a sip of water , along with his fluoxetine  He will hold his aspirin for 1 week prior to surgery  Patient is medically cleared for the proposed procedure pending results of the CT of the chest      Quality Measures: BMI Counseling: Body mass index is 29 4 kg/m²  The BMI is above normal  Nutrition recommendations include decreasing portion sizes, encouraging healthy choices of fruits and vegetables, consuming healthier snacks, moderation in carbohydrate intake and reducing intake of cholesterol  Exercise recommendations include exercising 3-5 times per week  Rationale for BMI follow-up plan is due to patient being overweight or obese  No follow-ups on file  Diagnoses and all orders for this visit:    Pre-op examination    Complete tear of left rotator cuff, unspecified whether traumatic    Benign hypertension    Mixed hyperlipidemia    Abnormal CXR  -     CT chest wo contrast; Future          Subjective:      Patient ID: Martine Hilliard is a 73 y o  male  74-year-old male presents for preop medical clearance for upcoming left rotator cuff surgery to be done by Dr Lashanda Blake , on 03/30/2022  Patient reports falling on the ice landing on his left elbow and jamming his elbow up into his shoulder causing the injury  Conservative treatment has been unsuccessful  Patient has marked limitation of motion of the left shoulder with pain  History of hypertension:  Well controlled  Bita cp, palp, sob, edema, HA, dizziness, diaphoresis, syncope, visual disturbance      Patient has had an abnormal chest x-ray  He is asymptomatic  Patient has had previous surgeries requiring anesthesia and denies any difficulty with any anesthesia  EMR has been reviewed, clarified, and updated with patient today        ALLERGIES:  Allergies   Allergen Reactions    Amlodipine Swelling    Lisinopril      Other reaction(s): Cough  Other reaction(s): Cough       CURRENT MEDICATIONS:    Current Outpatient Medications:     aspirin 81 MG tablet, Take 81 mg by mouth daily, Disp: , Rfl:     atenolol (TENORMIN) 50 mg tablet, TAKE 1 TABLET DAILY, Disp: 90 tablet, Rfl: 3    Cholecalciferol (VITAMIN D3) 2000 UNITS capsule, Take by mouth, Disp: , Rfl:     esomeprazole (NexIUM) 20 mg capsule, Take 20 mg by mouth every morning before breakfast, Disp: , Rfl:     FLUoxetine (PROzac) 20 mg capsule, TAKE 1 CAPSULE BY MOUTH EVERY DAY, Disp: 90 capsule, Rfl: 3    olmesartan-hydrochlorothiazide (BENICAR HCT) 20-12 5 MG per tablet, TAKE 1 TABLET DAILY, Disp: 90 tablet, Rfl: 3    simvastatin (ZOCOR) 20 mg tablet, TAKE 1 TABLET DAILY, Disp: 90 tablet, Rfl: 3    ACTIVE PROBLEM LIST:  Patient Active Problem List   Diagnosis    Complete tear of left rotator cuff    Biceps tendinitis    Benign hypertension    Allergic rhinitis    Hyperlipidemia    Depression    Impaired fasting glucose    Vitamin D deficiency    Bilateral primary osteoarthritis of knee    Primary osteoarthritis of right knee    Primary osteoarthritis of left knee    Chronic venous insufficiency       PAST MEDICAL HISTORY:  Past Medical History:   Diagnosis Date    Arthritis 2012    Depression 2012    Hyperlipidemia     Hypertension        PAST SURGICAL HISTORY:  Past Surgical History:   Procedure Laterality Date    ABDOMINAL SURGERY      KNEE ARTHROSCOPY      NASAL SINUS SURGERY      WI ARTHROSCOPY SHOULDER SURGICAL BICEPS TENODESIS Right 1/25/2017    Procedure: ARTHROSCOPIC BICEPS TENODESIS;  Surgeon: Stan Cleveland MD;  Location: [facility]; Service: Orthopedics    IL SHLDR ARTHROSCOP,SURG,W/ROTAT CUFF REPR Right 1/25/2017    Procedure: SHOULDER ARTHROSCOPY ROTATOR CUFF REPAIR ;  Surgeon: Marlan Oppenheim, MD;  Location: [facility];  Service: Orthopedics    [place] SURGICAL ARTHROSCOPY Collin Reveles DBRDMT 3+ Right 1/25/2017    Procedure: ARTHROSCOPY SHOULDER;  Surgeon: Marlan Oppenheim, MD;  Location: [facility];  Service: Orthopedics    TONSILLECTOMY         FAMILY HISTORY:  Family History   Problem Relation Age of Onset    Cancer Father         Brain tumor    Heart disease Father     Hyperlipidemia Father     Hypertension Father     Brain cancer Father     Migraines Mother     Cancer Brother         Melanoma       SOCIAL HISTORY:  Social History     Socioeconomic History    Marital status: /Civil Union     Spouse name: Not on file    Number of children: Not on file    Years of education: Not on file    Highest education level: Not on file   Occupational History    Occupation: Part time   Tobacco Use    Smoking status: Former Smoker     Packs/day: 0 25     Years: 30 00     Pack years: 7 50     Types: Cigarettes    Smokeless tobacco: Never Used   Vaping Use    Vaping Use: Never used   Substance and Sexual Activity    Alcohol use: Not Currently     Alcohol/week: 0 0 standard drinks     Comment: social    Drug use: No    Sexual activity: Yes     Partners: Female     Birth control/protection: Condom Male     Comment: Denied high risk sexual behavior   Other Topics Concern    Not on file   Social History Narrative    Not on file     Social Determinants of Health     Financial Resource Strain: Not on file   Food Insecurity: Not on file   Transportation Needs: Not on file   Physical Activity: Not on file   Stress: Not on file   Social Connections: Not on file   Intimate Partner Violence: Not on file   Housing Stability: Not on file       Review of Systems   Constitutional: Negative for activity change, chills, fatigue and fever  HENT: Negative for congestion  Eyes: Positive for visual disturbance (wears glasses)  Negative for discharge  Respiratory: Negative for cough, chest tightness and shortness of breath  Cardiovascular: Positive for leg swelling  Negative for chest pain and palpitations  Gastrointestinal: Negative for abdominal pain, blood in stool, constipation, diarrhea, nausea and vomiting  Endocrine: Negative for polydipsia, polyphagia and polyuria  Genitourinary: Negative for difficulty urinating  Musculoskeletal: Negative for arthralgias and myalgias  Skin: Negative for rash  Allergic/Immunologic: Negative for immunocompromised state  Neurological: Negative for dizziness, syncope, weakness, light-headedness and headaches  Hematological: Negative for adenopathy  Does not bruise/bleed easily  Psychiatric/Behavioral: Negative for dysphoric mood, sleep disturbance and suicidal ideas  The patient is not nervous/anxious  Objective:  Vitals:    03/23/22 1458   BP: 112/84   BP Location: Right arm   Patient Position: Sitting   Cuff Size: Adult   Pulse: 65   Resp: 18   Temp: (!) 96 9 °F (36 1 °C)   TempSrc: Tympanic   SpO2: 95%   Weight: 98 3 kg (216 lb 12 8 oz)   Height: 6' (1 829 m)     Body mass index is 29 4 kg/m²  Physical Exam  Vitals and nursing note reviewed  Constitutional:       General: He is not in acute distress  Appearance: Normal appearance  He is well-developed  He is not ill-appearing  HENT:      Head: Normocephalic  Right Ear: Tympanic membrane, ear canal and external ear normal       Left Ear: Tympanic membrane, ear canal and external ear normal       Nose: Nose normal       Mouth/Throat:      Mouth: Mucous membranes are moist       Pharynx: Oropharynx is clear  No oropharyngeal exudate  Eyes:      General: No scleral icterus  Right eye: No discharge  Left eye: No discharge  Extraocular Movements: Extraocular movements intact  Conjunctiva/sclera: Conjunctivae normal       Pupils: Pupils are equal, round, and reactive to light  Neck:      Thyroid: No thyromegaly  Vascular: No carotid bruit or JVD  Trachea: No tracheal deviation  Cardiovascular:      Rate and Rhythm: Normal rate and regular rhythm  Pulses: Normal pulses  Heart sounds: Normal heart sounds  No murmur heard  No friction rub  No gallop  Pulmonary:      Effort: Pulmonary effort is normal  No respiratory distress  Breath sounds: Normal breath sounds  No wheezing or rales  Chest:      Chest wall: No tenderness  Abdominal:      General: Bowel sounds are normal  There is no distension  Palpations: Abdomen is soft  There is no hepatomegaly, splenomegaly or mass  Tenderness: There is no abdominal tenderness  There is no right CVA tenderness, left CVA tenderness, guarding or rebound  Musculoskeletal:         General: No tenderness or deformity  Normal range of motion  Cervical back: Normal range of motion and neck supple  Right lower leg: No edema  Left lower leg: No edema  Lymphadenopathy:      Cervical: No cervical adenopathy  Skin:     General: Skin is warm and dry  Findings: No rash  Neurological:      General: No focal deficit present  Mental Status: He is alert and oriented to person, place, and time  Cranial Nerves: No cranial nerve deficit  Sensory: No sensory deficit  Motor: No weakness or abnormal muscle tone  Coordination: Coordination normal       Gait: Gait normal       Deep Tendon Reflexes: Reflexes are normal and symmetric  Reflexes normal    Psychiatric:         Mood and Affect: Mood normal          Behavior: Behavior normal          Thought Content:  Thought content normal          Judgment: Judgment normal            RESULTS:    Recent Results (from the past 1008 hour(s))   Comprehensive metabolic panel    Collection Time: 03/05/22  8:41 AM   Result Value Ref Range Sodium 142 136 - 145 mmol/L    Potassium 4 2 3 5 - 5 3 mmol/L    Chloride 105 100 - 108 mmol/L    CO2 30 21 - 32 mmol/L    ANION GAP 7 4 - 13 mmol/L    BUN 30 (H) 5 - 25 mg/dL    Creatinine 1 16 0 60 - 1 30 mg/dL    Glucose, Fasting 102 (H) 65 - 99 mg/dL    Calcium 8 9 8 3 - 10 1 mg/dL    Corrected Calcium 9 4 8 3 - 10 1 mg/dL    AST 17 5 - 45 U/L    ALT 38 12 - 78 U/L    Alkaline Phosphatase 48 46 - 116 U/L    Total Protein 6 5 6 4 - 8 2 g/dL    Albumin 3 4 (L) 3 5 - 5 0 g/dL    Total Bilirubin 0 89 0 20 - 1 00 mg/dL    eGFR 63 ml/min/1 73sq m   CBC and differential    Collection Time: 03/05/22  8:41 AM   Result Value Ref Range    WBC 8 86 4 31 - 10 16 Thousand/uL    RBC 4 84 3 88 - 5 62 Million/uL    Hemoglobin 15 4 12 0 - 17 0 g/dL    Hematocrit 44 4 36 5 - 49 3 %    MCV 92 82 - 98 fL    MCH 31 8 26 8 - 34 3 pg    MCHC 34 7 31 4 - 37 4 g/dL    RDW 18 6 (H) 11 6 - 15 1 %    MPV 10 3 8 9 - 12 7 fL    Platelets 863 324 - 544 Thousands/uL    nRBC 0 /100 WBCs    Neutrophils Relative 69 43 - 75 %    Immat GRANS % 0 0 - 2 %    Lymphocytes Relative 20 14 - 44 %    Monocytes Relative 8 4 - 12 %    Eosinophils Relative 2 0 - 6 %    Basophils Relative 1 0 - 1 %    Neutrophils Absolute 6 19 1 85 - 7 62 Thousands/µL    Immature Grans Absolute 0 03 0 00 - 0 20 Thousand/uL    Lymphocytes Absolute 1 76 0 60 - 4 47 Thousands/µL    Monocytes Absolute 0 67 0 17 - 1 22 Thousand/µL    Eosinophils Absolute 0 14 0 00 - 0 61 Thousand/µL    Basophils Absolute 0 07 0 00 - 0 10 Thousands/µL   Hemoglobin A1C    Collection Time: 03/05/22  8:41 AM   Result Value Ref Range    Hemoglobin A1C 6 3 (H) Normal 3 8-5 6%; PreDiabetic 5 7-6 4%;  Diabetic >=6 5%; Glycemic control for adults with diabetes <7 0% %     mg/dl   Lipid panel    Collection Time: 03/05/22  8:41 AM   Result Value Ref Range    Cholesterol 189 See Comment mg/dL    Triglycerides 100 See Comment mg/dL    HDL, Direct 56 >=40 mg/dL    LDL Calculated 113 (H) 0 - 100 mg/dL Non-HDL-Chol (CHOL-HDL) 133 mg/dl   TSH, 3rd generation with Free T4 reflex    Collection Time: 03/05/22  8:41 AM   Result Value Ref Range    TSH 3RD GENERATON 2 661 0 358 - 3 740 uIU/mL   ECG 12 lead    Collection Time: 03/18/22  2:53 PM   Result Value Ref Range    Ventricular Rate 62 BPM    Atrial Rate 62 BPM    PA Interval 184 ms    QRSD Interval 88 ms    QT Interval 372 ms    QTC Interval 377 ms    P Axis 54 degrees    QRS Axis 25 degrees    T Wave Axis 59 degrees   Comprehensive metabolic panel    Collection Time: 03/18/22  2:59 PM   Result Value Ref Range    Sodium 136 136 - 145 mmol/L    Potassium 3 8 3 5 - 5 3 mmol/L    Chloride 102 100 - 108 mmol/L    CO2 32 21 - 32 mmol/L    ANION GAP 2 (L) 4 - 13 mmol/L    BUN 22 5 - 25 mg/dL    Creatinine 1 21 0 60 - 1 30 mg/dL    Glucose 110 65 - 140 mg/dL    Calcium 8 6 8 3 - 10 1 mg/dL    Corrected Calcium 9 1 8 3 - 10 1 mg/dL    AST 22 5 - 45 U/L    ALT 36 12 - 78 U/L    Alkaline Phosphatase 53 46 - 116 U/L    Total Protein 6 4 6 4 - 8 2 g/dL    Albumin 3 4 (L) 3 5 - 5 0 g/dL    Total Bilirubin 0 77 0 20 - 1 00 mg/dL    eGFR 60 ml/min/1 73sq m   CBC and differential    Collection Time: 03/18/22  2:59 PM   Result Value Ref Range    WBC 8 41 4 31 - 10 16 Thousand/uL    RBC 4 55 3 88 - 5 62 Million/uL    Hemoglobin 14 6 12 0 - 17 0 g/dL    Hematocrit 41 5 36 5 - 49 3 %    MCV 91 82 - 98 fL    MCH 32 1 26 8 - 34 3 pg    MCHC 35 2 31 4 - 37 4 g/dL    RDW 18 5 (H) 11 6 - 15 1 %    MPV 10 9 8 9 - 12 7 fL    Platelets 198 315 - 299 Thousands/uL    nRBC 0 /100 WBCs    Neutrophils Relative 68 43 - 75 %    Immat GRANS % 1 0 - 2 %    Lymphocytes Relative 20 14 - 44 %    Monocytes Relative 8 4 - 12 %    Eosinophils Relative 2 0 - 6 %    Basophils Relative 1 0 - 1 %    Neutrophils Absolute 5 77 1 85 - 7 62 Thousands/µL    Immature Grans Absolute 0 04 0 00 - 0 20 Thousand/uL    Lymphocytes Absolute 1 67 0 60 - 4 47 Thousands/µL    Monocytes Absolute 0 71 0 17 - 1 22 Thousand/µL Eosinophils Absolute 0 14 0 00 - 0 61 Thousand/µL    Basophils Absolute 0 08 0 00 - 0 10 Thousands/µL       This note was created with voice recognition software  Phonic, grammatical and spelling errors may be present within the note as a result

## 2022-03-24 ENCOUNTER — HOSPITAL ENCOUNTER (OUTPATIENT)
Dept: CT IMAGING | Facility: HOSPITAL | Age: 72
Discharge: HOME | End: 2022-03-24
Payer: COMMERCIAL

## 2022-03-24 ENCOUNTER — TELEPHONE (OUTPATIENT)
Dept: OBGYN CLINIC | Facility: HOSPITAL | Age: 72
End: 2022-03-24

## 2022-03-24 ENCOUNTER — TELEPHONE (OUTPATIENT)
Dept: INTERNAL MEDICINE CLINIC | Facility: CLINIC | Age: 72
End: 2022-03-24

## 2022-03-24 DIAGNOSIS — R93.89 ABNORMAL CXR: ICD-10-CM

## 2022-03-24 PROCEDURE — 71250 CT THORAX DX C-: CPT

## 2022-03-24 PROCEDURE — G1004 CDSM NDSC: HCPCS

## 2022-03-24 NOTE — TELEPHONE ENCOUNTER
Patient sees Dr Clydene Schwab      Patient is calling to postpone his 3/30 surgery      He is requesting a callback from the surgery coordinator      CB: [phone]

## 2022-03-24 NOTE — TELEPHONE ENCOUNTER
Joel Mejia Hides with the pt and he is not clear by his PCP for his surgery  Pt states that he has an infection and is currently on antibiotics  Pt will reach back to me to reschedule his surgery soon    Thank Philippe Iqbal

## 2022-03-25 ENCOUNTER — CONSULT (OUTPATIENT)
Dept: PULMONOLOGY | Facility: CLINIC | Age: 72
End: 2022-03-25
Payer: COMMERCIAL

## 2022-03-25 VITALS
SYSTOLIC BLOOD PRESSURE: 126 MMHG | HEART RATE: 76 BPM | DIASTOLIC BLOOD PRESSURE: 72 MMHG | OXYGEN SATURATION: 95 % | TEMPERATURE: 97.8 F | WEIGHT: 214.6 LBS | HEIGHT: 72 IN | BODY MASS INDEX: 29.07 KG/M2

## 2022-03-25 DIAGNOSIS — R91.8 PULMONARY NODULES: ICD-10-CM

## 2022-03-25 DIAGNOSIS — R93.89 ABNORMAL CT OF THE CHEST: ICD-10-CM

## 2022-03-25 PROCEDURE — 1160F RVW MEDS BY RX/DR IN RCRD: CPT | Performed by: INTERNAL MEDICINE

## 2022-03-25 PROCEDURE — 3074F SYST BP LT 130 MM HG: CPT | Performed by: INTERNAL MEDICINE

## 2022-03-25 PROCEDURE — 1036F TOBACCO NON-USER: CPT | Performed by: INTERNAL MEDICINE

## 2022-03-25 PROCEDURE — 99204 OFFICE O/P NEW MOD 45 MIN: CPT | Performed by: INTERNAL MEDICINE

## 2022-03-25 PROCEDURE — 3078F DIAST BP <80 MM HG: CPT | Performed by: INTERNAL MEDICINE

## 2022-03-25 PROCEDURE — 3008F BODY MASS INDEX DOCD: CPT | Performed by: INTERNAL MEDICINE

## 2022-03-25 NOTE — PROGRESS NOTES
Assessment/Plan:     Diagnoses and all orders for this visit:    Abnormal CT of the chest  -     Ambulatory referral to Pulmonology  -     CT chest without contrast; Future    Pulmonary nodules  -     Ambulatory referral to Pulmonology          Plan for follow up:  CT of the chest report and images were reviewed with the patient, the right lower lobe lung nodule 1 6 cm abutting the fissure , with irregular margin another lung nodule around 1 6 x 0 8 cm with irregular margins and adjust sent to the is nodule with small additional nodules ? ? Infectious   He was just started on Ceftin yesterday discussed with the patient to continue the course and completed  The etiology is infectious inflammatory versus malignant discussed   The very low risk for lung cancer given very little smoking history and he does not have any personal history of cancer diagnosed  Discussed will repeat CT of the chest in 6 weeks and follow-up for resolution of these opacities  Also he does not have any white cell count,m ? Viral viral etiology not a typical presentation for a viral pneumonia   Discussed will follow-up with repeat CT chest in 6 weeks and if there is persistence of these opacities will plan for biopsy  Age-appropriate screening process with prostate and also getting colonoscopies which have been normal States  Follow-up after the about testing  Return in about 6 weeks (around 5/6/2022)  All questions are answered to the patient's satisfaction and understanding  He verbalizes understanding  He is encouraged to call with any further questions or concerns  Portions of the record may have been created with voice recognition software  Occasional wrong word or "sound a like" substitutions may have occurred due to the inherent limitations of voice recognition software  Read the chart carefully and recognize, using context, where substitutions have occurred  a    Electronically Signed by Mila Collins MD    ______________________________________________________________________    Chief Complaint: No chief complaint on file  Patient ID: Isabel Ross is a 73 y o  y o  male has a past medical history of Arthritis (2012), Depression (2012), Hyperlipidemia, and Hypertension  3/25/2022  Patient presents today for initial visit  Margret Almanzar is a very pleasant 62-year-old gentleman, with less than 10 pack-year smoking history who quit approximately 40 years ago he states, works as a  in [employer], was scheduled for a surgery for torn rotator cuff had a preop chest x-ray done found to have opacities on the right lung for which a CT scan of the chest was ordered and he is here for follow-up  He does not have any fevers nor the recent fevers no loss of appetite, he also states that he has very mild cough on and off for a few weeks not productive at all, no shortness of breath or wheezing, does not have any contact with any sick contacts recently, he states his wife was diagnosed with COVID without any symptoms when she went in for a different reason for the into the hospital was tested positive for COVID and that was in December 2021, after which he states he has not had any contact with any person with COVID he has not been in any gatherings not has a traveled out of the country or even within the country in the past 3 months he states  No family history of any lung cancer, he does not have any personal history of cancer diagnosed       Occupational/Exposure history:  Works in [employer] as a   Travel history:  None  Review of Systems   Constitutional: Negative  HENT: Negative  Eyes: Negative  Respiratory: Positive for cough (States the cough is very mild on and off few weeks not productive)  Cardiovascular: Negative  Gastrointestinal: Negative  Endocrine: Negative  Genitourinary: Negative  Musculoskeletal: Negative  Allergic/Immunologic: Negative  Neurological: Negative  Hematological: Negative  Psychiatric/Behavioral: Negative  Social history: He reports that he has quit smoking  His smoking use included cigarettes  He has a 7 50 pack-year smoking history  He has never used smokeless tobacco  He reports previous alcohol use  He reports that he does not use drugs      Past surgical history:   Past Surgical History:   Procedure Laterality Date    ABDOMINAL SURGERY      KNEE ARTHROSCOPY      NASAL SINUS SURGERY      WI ARTHROSCOPY SHOULDER SURGICAL BICEPS TENODESIS Right 1/25/2017    Procedure: ARTHROSCOPIC BICEPS TENODESIS;  Surgeon: Karl Matias MD;  [facility];  Service: Orthopedics    WI SHLDR ARTHROSCOP,SURG,W/ROTAT CUFF REPR Right 1/25/2017    Procedure: SHOULDER ARTHROSCOPY ROTATOR CUFF REPAIR ;  Surgeon: Karl Matias MD;  [facility];  Service: Orthopedics    WI SURGICAL ARTHROSCOPY Ghada Olsen DBRDMT 3+ Right 1/25/2017    Procedure: ARTHROSCOPY SHOULDER;  Surgeon: Karl Matias MD;  [facility];  Service: Orthopedics    TONSILLECTOMY       Family history:   Family History   Problem Relation Age of Onset    Cancer Father         Brain tumor    Heart disease Father     Hyperlipidemia Father     Hypertension Father     Brain cancer Father     Migraines Mother     Cancer Brother         Melanoma       Immunization History   Administered Date(s) Administered    COVID-19 J&J ([employer]) vaccine 0 5 mL 03/10/2021, 03/10/2021    INFLUENZA 12/04/2001, 11/08/2002    Influenza Quadrivalent, 6-35 Months IM 11/18/2015    Influenza, high dose seasonal 0 7 mL 10/19/2018, 02/10/2021, 12/10/2021    Pneumococcal Conjugate 13-Valent 07/08/2019    Pneumococcal Polysaccharide PPV23 07/28/2020    Tdap 07/19/2020     Current Outpatient Medications   Medication Sig Dispense Refill    aspirin 81 MG tablet Take 81 mg by mouth daily      atenolol (TENORMIN) 50 mg tablet TAKE 1 TABLET DAILY 90 tablet 3    cefuroxime (CEFTIN) 500 mg tablet Take 1 tablet (500 mg total) by mouth every 12 (twelve) hours for 10 days 20 tablet 0    Cholecalciferol (VITAMIN D3) 2000 UNITS capsule Take by mouth      esomeprazole (NexIUM) 20 mg capsule Take 20 mg by mouth every morning before breakfast      FLUoxetine (PROzac) 20 mg capsule TAKE 1 CAPSULE BY MOUTH EVERY DAY 90 capsule 3    olmesartan-hydrochlorothiazide (BENICAR HCT) 20-12 5 MG per tablet TAKE 1 TABLET DAILY 90 tablet 3    simvastatin (ZOCOR) 20 mg tablet TAKE 1 TABLET DAILY 90 tablet 3     No current facility-administered medications for this visit  Allergies: Amlodipine and Lisinopril    Objective:  Vitals:    03/25/22 1255   BP: 126/72   Pulse: 76   Temp: 97 8 °F (36 6 °C)   SpO2: 95%   Weight: 97 3 kg (214 lb 9 6 oz)   Height: 6' (1 829 m)   Oxygen Therapy  SpO2: 95 %    Wt Readings from Last 3 Encounters:   03/25/22 97 3 kg (214 lb 9 6 oz)   03/23/22 98 3 kg (216 lb 12 8 oz)   03/15/22 96 2 kg (212 lb)     Body mass index is 29 1 kg/m²  Physical Exam  Vitals and nursing note reviewed  Constitutional:       Appearance: He is well-developed  HENT:      Head: Normocephalic and atraumatic  Eyes:      Conjunctiva/sclera: Conjunctivae normal       Pupils: Pupils are equal, round, and reactive to light  Neck:      Thyroid: No thyromegaly  Vascular: No JVD  Cardiovascular:      Rate and Rhythm: Normal rate and regular rhythm  Heart sounds: Normal heart sounds  No murmur heard  No friction rub  No gallop  Pulmonary:      Effort: Pulmonary effort is normal  No respiratory distress  Breath sounds: Normal breath sounds  No wheezing or rales  Chest:      Chest wall: No tenderness  Musculoskeletal:         General: No tenderness or deformity  Normal range of motion  Cervical back: Normal range of motion and neck supple  Lymphadenopathy:      Cervical: No cervical adenopathy  Skin:     General: Skin is warm and dry     Neurological:      Mental Status: He is alert and oriented to person, place, and time  Diagnostics:  I have personally reviewed pertinent films in PACS  XR chest pa & lateral    Result Date: 3/21/2022  Narrative: CHEST INDICATION:   S46 012D: Strain of muscle(s) and tendon(s) of the rotator cuff of left shoulder, subsequent encounter  COMPARISON:  None EXAM PERFORMED/VIEWS:  XR CHEST PA & LATERAL FINDINGS: Cardiomediastinal silhouette appears unremarkable  2 ill-defined groundglass opacities in the right midlung, 2 cm and smaller  No effusion or pneumothorax  Osseous structures appear within normal limits for patient age  Impression: 2 ill-defined groundglass opacities in the right midlung, 2 cm and smaller  If the patient has signs and symptoms of infection, recommend follow-up with a chest radiograph in 6 weeks after treatment  In the absence of signs or symptoms of infection, further evaluation is recommended with a chest CT  I notified Dr Kelley Fitzpatrick by Epic message on 3/21/2022 at 3:27 PM  This study was also marked for significant notification and follow-up  Workstation performed: [id number]     XR shoulder 2+ views LEFT    Result Date: 2/28/2022  Narrative: LEFT SHOULDER INDICATION:   L shoulder pain  COMPARISON:  None VIEWS:  XR SHOULDER 2+ VW LEFT FINDINGS: There is no acute fracture or dislocation  Mild osteoarthritis glenohumeral joint  No lytic or blastic osseous lesion  Soft tissues are unremarkable  Impression: No acute osseous abnormality  Workstation performed: [id number]     CT chest wo contrast    Result Date: 3/24/2022  Narrative: CT CHEST WITHOUT IV CONTRAST INDICATION:  R93 89: Abnormal findings on diagnostic imaging of other specified body structures   COMPARISON:  CT chest 7/16/2017, chest radiograph 3/18/2022 TECHNIQUE: CT examination of the chest was performed without intravenous contrast   Axial, sagittal, and coronal 2D reformatted images were created from the source data and submitted for interpretation  Radiation dose length product (DLP) for this visit:  419 6 mGy-cm  This examination, like all CT scans performed in the [facility], was performed utilizing techniques to minimize radiation dose exposure, including the use of iterative reconstruction and automated exposure control  FINDINGS: LUNGS:  In the right lower lobe abutting the major fissure is a 1 6 x 1 1 x 0 9 cm nodular lesion with irregular margins and slight posterior puckering of the fissure  This is new from the prior study  Additional lung nodules will be described on series 204; -New 5 mm subpleural right lower lobe nodule image 57  -Just inferior to the above nodule is another new subpleural nodular lesion with irregular margins measuring 1 6 x 0 8 cm image 63  -New 4 mm right lower lobe nodule image 71 with subtle adjacent groundglass  -5 mm right lower lobe nodule image 78  -Small cluster of micronodules in the posterior right lower lobe images 82-84  -3 mm left upper lobe nodule with adjacent groundglass image 36  -New 5 mm left lower lobe nodule abutting the major fissure image 59  -Small groundglass opacity/nodule posterior left lower lobe images 70-73  Small calcified left upper and right lower lobe granulomas  No central endobronchial lesions  PLEURA:  Unremarkable  HEART/GREAT VESSELS: Heart is unremarkable for patient's age  Minimal coronary artery calcification  No pericardial effusion  Mild atherosclerotic changes thoracic aorta without aneurysm  MEDIASTINUM AND JESSE:  Unremarkable  CHEST WALL AND LOWER NECK:   Unremarkable  VISUALIZED STRUCTURES IN THE UPPER ABDOMEN:  1 2 cm subcapsular cyst dome of the left hepatic lobe suggested  Minimal diverticulosis noted about the splenic flexure  OSSEOUS STRUCTURES:  No acute fracture or destructive osseous lesion  Mild chronic superior endplate P32 compression fracture  Old right posterior 10th rib fracture  Mild degenerative changes of the spine  Impression: 1  There are 2 new worrisome nodules in the right lower lobe with irregular margins each measuring about 1 6 cm in size  The possibility that either of these lesions represent primary bronchogenic cancer is raised  However, there are several additional small bilateral nodules and/or groundglass infiltrates which raise the possibility of infectious or inflammatory etiology  The former larger nodules could also potentially be pseudonodules related to nonneoplastic cause  Based on current Fleischner Society 2017 Guidelines on incidental pulmonary nodule, either PET/CT scan evaluation, tissue sampling or short term interval followup non-contrast CT followup (initially in 3 months) may be considered appropriate  Given this differential, PET/CT is probably least likely to be helpful and therefore I would favor either short-term follow-up CT or tissue sampling  2  No pathologic adenopathy detected on noncontrast CT imaging  The study was marked in [facility] for significant notification and follow-up  Workstation performed: [id number]     MRI shoulder left wo contrast    Result Date: 3/7/2022  Narrative: MRI LEFT SHOULDER INDICATION:   M24 812: Other specific joint derangements of left shoulder, not elsewhere classified  COMPARISON:  None  TECHNIQUE:   The following MR sequences were obtained of the left shoulder: Localizer, axial GRE/PD fat sat, oblique coronal T2 fat sat, oblique sagittal T1/T2 fat sat  Gadolinium was not used  FINDINGS: SUBCUTANEOUS TISSUES: Normal JOINT EFFUSION: None  ACROMION PROCESS: Type I acromion seen ROTATOR CUFF: There is full-thickness tear of the supraspinatus with tear measuring 7 mm mediolaterally and 2 2 cm anteroposteriorly  The tear extends posteriorly to involve the infraspinatus    The tear through the posterior aspect of the cuff is larger  than through the anterior aspect of the cuff The tear through the posterior aspect of the cuff measures 4 image mediolaterally with the tendon lying at the level of the glenohumeral joint Edema fluid noted around the musculotendinous junction of the infraspinatus edema and fluid also noted around the musculotendinous junction of the supraspinatus Partial tear through the infraspinatus is also noted Subscapularis is intact SUBACROMIAL/SUBDELTOID BURSA: Fluid in the subacromial subdeltoid bursa seen LONG HEAD OF BICEPS TENDON: Normal the biceps tendon is intact GLENOID LABRUM: Mild degenerative changes seen within the labrum without labral detachment GLENOHUMERAL JOINT: No significant arthritic changes seen ACROMIOCLAVICULAR JOINT:  Arthritic changes are seen within the [facility] joint BONES: No bone contusion seen     Impression: Moderate to large sized full-thickness rotator cuff tear No atrophy of the supraspinatus and infraspinatus Fluid noted around the musculotendinous junction of the supraspinatus and infraspinatus representing acute musculotendinous strain Workstation performed: [id number]

## 2022-04-12 ENCOUNTER — OFFICE VISIT (OUTPATIENT)
Dept: OBGYN CLINIC | Facility: CLINIC | Age: 72
End: 2022-04-12
Payer: COMMERCIAL

## 2022-04-12 VITALS
WEIGHT: 215.4 LBS | SYSTOLIC BLOOD PRESSURE: 129 MMHG | HEIGHT: 72 IN | HEART RATE: 72 BPM | DIASTOLIC BLOOD PRESSURE: 75 MMHG | BODY MASS INDEX: 29.17 KG/M2

## 2022-04-12 DIAGNOSIS — M17.12 PRIMARY OSTEOARTHRITIS OF LEFT KNEE: Primary | ICD-10-CM

## 2022-04-12 DIAGNOSIS — M17.11 PRIMARY OSTEOARTHRITIS OF RIGHT KNEE: ICD-10-CM

## 2022-04-12 PROCEDURE — 3008F BODY MASS INDEX DOCD: CPT | Performed by: ORTHOPAEDIC SURGERY

## 2022-04-12 PROCEDURE — 3078F DIAST BP <80 MM HG: CPT | Performed by: ORTHOPAEDIC SURGERY

## 2022-04-12 PROCEDURE — 1160F RVW MEDS BY RX/DR IN RCRD: CPT | Performed by: ORTHOPAEDIC SURGERY

## 2022-04-12 PROCEDURE — 1036F TOBACCO NON-USER: CPT | Performed by: ORTHOPAEDIC SURGERY

## 2022-04-12 PROCEDURE — 99213 OFFICE O/P EST LOW 20 MIN: CPT | Performed by: ORTHOPAEDIC SURGERY

## 2022-04-12 PROCEDURE — 3074F SYST BP LT 130 MM HG: CPT | Performed by: ORTHOPAEDIC SURGERY

## 2022-04-12 NOTE — PROGRESS NOTES
Orthopaedics Office Visit - follow up Patient Visit    ASSESSMENT/PLAN:    Assessment:   Bilateral knee osteoarthritis CSI 1/18/22    Plan:   Patient doing well and seeing good relief from previous injection  Discussed we can repeat these every 3 months as needed  Continue with activities as tolerated  Follow up as needed     To Do Next Visit:  prn    _____________________________________________________  CHIEF COMPLAINT:  Chief Complaint   Patient presents with    Left Knee - Follow-up    Right Knee - Follow-up         SUBJECTIVE:  Jeanna Mack is a 73 y o  male who presents for follow up evaluation bilateral knee osteoarthritis  Patient underwent steroid injections at his last visit on 1/18/22 which he states provided him with good relief  He denies any pain  He does take Aleve OTC for pain however, states this is for his shoulder  Patient was scheduled to undergo shoulder surgery with Dr Mickie Rubalcava which was postponed        PAST MEDICAL HISTORY:  Past Medical History:   Diagnosis Date    Arthritis 2012    Depression 2012    Hyperlipidemia     Hypertension        PAST SURGICAL HISTORY:  Past Surgical History:   Procedure Laterality Date    ABDOMINAL SURGERY      KNEE ARTHROSCOPY      NASAL SINUS SURGERY      DE ARTHROSCOPY SHOULDER SURGICAL BICEPS TENODESIS Right 1/25/2017    Procedure: ARTHROSCOPIC BICEPS TENODESIS;  Surgeon: Fan Trevino MD;  [facility];  Service: Orthopedics    DE SHLDR ARTHROSCOP,SURG,W/ROTAT CUFF REPR Right 1/25/2017    Procedure: SHOULDER ARTHROSCOPY ROTATOR CUFF REPAIR ;  Surgeon: Fan Trevino MD;  [facility];  Service: Orthopedics    DE SURGICAL ARTHROSCOPY Adolfo Aileen DBRDMT 3+ Right 1/25/2017    Procedure: ARTHROSCOPY SHOULDER;  Surgeon: Fan Trevino MD;  [facility];  Service: Orthopedics    TONSILLECTOMY         FAMILY HISTORY:  Family History   Problem Relation Age of Onset    Cancer Father         Brain tumor    Heart disease Father     Hyperlipidemia Father     Hypertension Father     Brain cancer Father     Migraines Mother     Cancer Brother         Melanoma       SOCIAL HISTORY:  Social History     Tobacco Use    Smoking status: Former Smoker     Packs/day: 0 25     Years: 30 00     Pack years: 7 50     Types: Cigarettes    Smokeless tobacco: Never Used   Vaping Use    Vaping Use: Never used   Substance Use Topics    Alcohol use: Not Currently     Alcohol/week: 0 0 standard drinks     Comment: social    Drug use: No       MEDICATIONS:    Current Outpatient Medications:     aspirin 81 MG tablet, Take 81 mg by mouth daily, Disp: , Rfl:     atenolol (TENORMIN) 50 mg tablet, TAKE 1 TABLET DAILY, Disp: 90 tablet, Rfl: 3    Cholecalciferol (VITAMIN D3) 2000 UNITS capsule, Take by mouth, Disp: , Rfl:     esomeprazole (NexIUM) 20 mg capsule, Take 20 mg by mouth every morning before breakfast, Disp: , Rfl:     FLUoxetine (PROzac) 20 mg capsule, TAKE 1 CAPSULE BY MOUTH EVERY DAY, Disp: 90 capsule, Rfl: 3    olmesartan-hydrochlorothiazide (BENICAR HCT) 20-12 5 MG per tablet, TAKE 1 TABLET DAILY, Disp: 90 tablet, Rfl: 3    simvastatin (ZOCOR) 20 mg tablet, TAKE 1 TABLET DAILY, Disp: 90 tablet, Rfl: 3    ALLERGIES:  Allergies   Allergen Reactions    Amlodipine Swelling    Lisinopril      Other reaction(s): Cough  Other reaction(s): Cough       REVIEW OF SYSTEMS:  MSK: as noted in HPI  Neuro: WNL  Pertinent items are otherwise noted in HPI  A comprehensive review of systems was otherwise negative      LABS:  HgA1c:   Lab Results   Component Value Date    HGBA1C 6 3 (H) 03/05/2022     BMP:   Lab Results   Component Value Date    GLUCOSE 95 10/09/2015    CALCIUM 8 6 03/18/2022     10/09/2015    K 3 8 03/18/2022    CO2 32 03/18/2022     03/18/2022    BUN 22 03/18/2022    CREATININE 1 21 03/18/2022     CBC: No components found for: CBC    _____________________________________________________  PHYSICAL EXAMINATION:  Vital signs: /75   Pulse 72   Ht 6' (1 829 m)   Wt 97 7 kg (215 lb 6 4 oz)   BMI 29 21 kg/m²   General: No acute distress, awake and alert  Psychiatric: Mood and affect appear appropriate  HEENT: Trachea Midline, No torticollis, no apparent facial trauma  Cardiovascular: No audible murmurs;  Extremities appear perfused  Pulmonary: No audible wheezing or stridor  Skin: No open lesions; see further details (if any) below    MUSCULOSKELETAL EXAMINATION:  Extremities:  Left knee  0-120  NTTP joint line  No effusion    Right knee  0-120  NTTP joint line  No effusion       _____________________________________________________  STUDIES REVIEWED:  I personally reviewed the images and interpretation is as follows:  none    PROCEDURES PERFORMED:  No procedure performed     Scribe Attestation    I,:  Glo Swan MA am acting as a scribe while in the presence of the attending physician :       I,:  Sujatha Paulson MD personally performed the services described in this documentation    as scribed in my presence :

## 2022-04-20 ENCOUNTER — RA CDI HCC (OUTPATIENT)
Dept: OTHER | Facility: HOSPITAL | Age: 72
End: 2022-04-20

## 2022-04-20 NOTE — PROGRESS NOTES
[facility] 75  coding opportunities          Chart Reviewed number of suggestions sent to Provider: 1     F32  A Depression  *- Can Depression be further classified using more specific code from any of the following:     F33 0  Major depressive disorder, recurrent, mild (HCC) OR   F33 1  Major depressive disorder, recurrent, moderate (HCC)  OR  F33 2  Major depressive disorder, recurrent, severe without psychotic features ([facility] ) OR  F33 8   Other recurrent depressive disorders ([place] [address] ) OR  F33 9   Major depressive disorder, recurrent, unspecified ([place] [address] )    If this is correct, please document and assess at your next visit,4/27/2022    Patients Insurance        Commercial Insurance: [employer]

## 2022-05-09 ENCOUNTER — HOSPITAL ENCOUNTER (OUTPATIENT)
Dept: CT IMAGING | Facility: HOSPITAL | Age: 72
Discharge: HOME | End: 2022-05-09
Attending: INTERNAL MEDICINE
Payer: COMMERCIAL

## 2022-05-09 DIAGNOSIS — R93.89 ABNORMAL CT OF THE CHEST: ICD-10-CM

## 2022-05-09 PROCEDURE — G1004 CDSM NDSC: HCPCS

## 2022-05-09 PROCEDURE — 71250 CT THORAX DX C-: CPT

## 2022-05-10 ENCOUNTER — TELEPHONE (OUTPATIENT)
Dept: INTERNAL MEDICINE CLINIC | Facility: CLINIC | Age: 72
End: 2022-05-10

## 2022-05-10 DIAGNOSIS — N40.0 BENIGN PROSTATIC HYPERPLASIA WITHOUT LOWER URINARY TRACT SYMPTOMS: Primary | ICD-10-CM

## 2022-05-10 RX ORDER — TAMSULOSIN HYDROCHLORIDE 0.4 MG/1
0.4 CAPSULE ORAL DAILY
Qty: 30 CAPSULE | Refills: 5 | Status: SHIPPED | OUTPATIENT
Start: 2022-05-10

## 2022-05-10 NOTE — TELEPHONE ENCOUNTER
----- Message from Dayami Craig LPN sent at 2/57/3421  9:00 AM EDT -----  Regarding: FW: Refill    ----- Message -----  From: Casey Salter  Sent: 5/10/2022   8:59 AM EDT  To: [facility]  Subject: Refill                                           I need a refill for my Tamsulosin please  Please send to [address] [facility]  Thank you

## 2022-05-13 ENCOUNTER — OFFICE VISIT (OUTPATIENT)
Dept: PULMONOLOGY | Facility: CLINIC | Age: 72
End: 2022-05-13
Payer: COMMERCIAL

## 2022-05-13 VITALS
BODY MASS INDEX: 29.12 KG/M2 | WEIGHT: 215 LBS | TEMPERATURE: 97.6 F | HEIGHT: 72 IN | HEART RATE: 57 BPM | SYSTOLIC BLOOD PRESSURE: 120 MMHG | OXYGEN SATURATION: 94 % | DIASTOLIC BLOOD PRESSURE: 84 MMHG

## 2022-05-13 DIAGNOSIS — R91.8 PULMONARY NODULES: ICD-10-CM

## 2022-05-13 DIAGNOSIS — R93.89 ABNORMAL CT OF THE CHEST: Primary | ICD-10-CM

## 2022-05-13 PROCEDURE — 99214 OFFICE O/P EST MOD 30 MIN: CPT | Performed by: INTERNAL MEDICINE

## 2022-05-13 NOTE — PROGRESS NOTES
Assessment/Plan:   Diagnoses and all orders for this visit:    Abnormal CT of the chest  -     CT chest without contrast; Future    Pulmonary nodules        CT of the chest report and images were reviewed with the patient the right lower lobe lung nodule, currently 1 2 x 1 cm, which has decreased in size from the prior CT which was around 1 5 cm then  The 1 4 x 1 2 cm irregular nodule in the major fissure has not changed from the prior CT  Given the decrease in the size of 1 of the lung nodules, likely still infectious versus inflammatory etiology will follow-up again in the next 6-8 weeks with a repeat CT of the chest  In the meantime if there is any symptoms of fever cough he will give the office a call  Otherwise will follow-up with a repeat CT of the chest   Return in about 8 weeks (around 7/8/2022)  All questions are answered to the patient's satisfaction and understanding  He verbalizes understanding  He is encouraged to call with any further questions or concerns  Portions of the record may have been created with voice recognition software  Occasional wrong word or "sound a like" substitutions may have occurred due to the inherent limitations of voice recognition software  Read the chart carefully and recognize, using context, where substitutions have occurred  Electronically Signed by Franco Gutiérrez MD    ______________________________________________________________________    Chief Complaint: No chief complaint on file  Patient ID: Esteban Lipoma is a 73 y o  y o  male has a past medical history of Arthritis (2012), Depression (2012), Hyperlipidemia, and Hypertension  5/13/2022  Patient presents today for follow-up visit    Dixie Ferguson is a very pleasant 72-year-old gentleman, with less than 10 pack-year smoking history who quit approximately 40 years ago he states, works as a  in [employer], was scheduled for a surgery for torn rotator cuff had a preop chest x-ray done found to have opacities on the right lung for which a CT scan of the chest was ordered and he is here for follow-up  He does not have any fevers nor the recent fevers no loss of appetite, he also states that he has very mild cough on and off for a few weeks not productive at all, no shortness of breath or wheezing, does not have any contact with any sick contacts recently, he states his wife was diagnosed with COVID without any symptoms when she went in for a different reason for the into the hospital was tested positive for COVID and that was in December 2021, after which he states he has not had any contact with any person with COVID he has not been in any gatherings not has a traveled out of the country or even within the country in the past 3 months he states  No family history of any lung cancer, he does not have any personal history of cancer diagnosed   After his 1st CT scan he was given an antibiotic which she has completed he also had a repeat CT of the chest and he is here for follow-up      Review of Systems   Constitutional: Negative  HENT: Negative  Eyes: Negative  Respiratory: Negative  Cardiovascular: Negative  Gastrointestinal: Negative  Endocrine: Negative  Genitourinary: Negative  Musculoskeletal: Negative  Allergic/Immunologic: Negative  Neurological: Negative  Hematological: Negative  Psychiatric/Behavioral: Negative  Smoking history: He reports that he quit smoking about 40 years ago  His smoking use included cigarettes  He started smoking about 55 years ago  He has a 7 50 pack-year smoking history   He has never used smokeless tobacco     The following portions of the patient's history were reviewed and updated as appropriate: allergies, current medications, past family history, past medical history, past social history, past surgical history and problem list     Immunization History   Administered Date(s) Administered    COVID-19 J&J (RUNform) vaccine 0 5 mL 03/10/2021, 03/10/2021    INFLUENZA 12/04/2001, 11/08/2002    Influenza Quadrivalent, 6-35 Months IM 11/18/2015    Influenza, high dose seasonal 0 7 mL 10/19/2018, 02/10/2021, 12/10/2021    Pneumococcal Conjugate 13-Valent 07/08/2019    Pneumococcal Polysaccharide PPV23 07/28/2020    Tdap 07/19/2020     Current Outpatient Medications   Medication Sig Dispense Refill    aspirin 81 MG tablet Take 81 mg by mouth daily      atenolol (TENORMIN) 50 mg tablet TAKE 1 TABLET DAILY 90 tablet 3    Cholecalciferol (VITAMIN D3) 2000 UNITS capsule Take by mouth      esomeprazole (NexIUM) 20 mg capsule Take 20 mg by mouth every morning before breakfast      FLUoxetine (PROzac) 20 mg capsule TAKE 1 CAPSULE BY MOUTH EVERY DAY 90 capsule 3    olmesartan-hydrochlorothiazide (BENICAR HCT) 20-12 5 MG per tablet TAKE 1 TABLET DAILY 90 tablet 3    simvastatin (ZOCOR) 20 mg tablet TAKE 1 TABLET DAILY 90 tablet 3    tamsulosin (FLOMAX) 0 4 mg Take 1 capsule (0 4 mg total) by mouth in the morning 30 capsule 5     No current facility-administered medications for this visit  Allergies: Amlodipine and Lisinopril    Objective:  Vitals:    05/13/22 0824   BP: 120/84   Pulse: 57   Temp: 97 6 °F (36 4 °C)   SpO2: 94%   Weight: 97 5 kg (215 lb)   Height: 6' (1 829 m)   Oxygen Therapy  SpO2: 94 %    Wt Readings from Last 3 Encounters:   05/13/22 97 5 kg (215 lb)   04/12/22 97 7 kg (215 lb 6 4 oz)   03/25/22 97 3 kg (214 lb 9 6 oz)     Body mass index is 29 16 kg/m²  Physical Exam  Vitals and nursing note reviewed  Constitutional:       Appearance: He is well-developed  HENT:      Head: Normocephalic and atraumatic  Eyes:      Conjunctiva/sclera: Conjunctivae normal       Pupils: Pupils are equal, round, and reactive to light  Neck:      Thyroid: No thyromegaly  Vascular: No JVD  Cardiovascular:      Rate and Rhythm: Normal rate and regular rhythm  Heart sounds: Normal heart sounds  No murmur heard      No friction rub  No gallop  Pulmonary:      Effort: Pulmonary effort is normal  No respiratory distress  Breath sounds: Normal breath sounds  No wheezing or rales  Chest:      Chest wall: No tenderness  Musculoskeletal:         General: No tenderness or deformity  Normal range of motion  Cervical back: Normal range of motion and neck supple  Lymphadenopathy:      Cervical: No cervical adenopathy  Skin:     General: Skin is warm and dry  Neurological:      Mental Status: He is alert and oriented to person, place, and time  Diagnostics:  I have personally reviewed pertinent films in PACS  CT chest without contrast    Result Date: 5/11/2022  Narrative: CT CHEST WITHOUT IV CONTRAST INDICATION:   R93 89: Abnormal findings on diagnostic imaging of other specified body structures  COMPARISON:  CT chest dated 3/24/2022  TECHNIQUE: CT examination of the chest was performed without intravenous contrast  Axial, sagittal, and coronal 2D reformatted images were created from the source data and submitted for interpretation  Radiation dose length product (DLP) for this visit:  407 mGy-cm   This examination, like all CT scans performed in the [facility], was performed utilizing techniques to minimize radiation dose exposure, including the use of iterative reconstruction and automated exposure control  FINDINGS: LUNGS:  There are no new or enlarging pulmonary nodules  The following pulmonary nodules are redemonstrated: 1 2 x 1 1 x 1 0 cm subpleural nodule in the right lower lobe (image 53, series 3), mildly decreased in size from the previous examination where it measured 1 5 x 1 1 x 1 5 cm  after remeasurement in a similar fashion 4 mm subpleural right lower lobe nodule (image 47, series 3) 1 4 x 1 2 cm irregular nodule abutting the major fissure (image 63, series 3), not significantly changed 4 mm right lower lobe nodule (image 62, series 3), not significantly changed   There is no endobronchial lesion  PLEURA:  Unremarkable  HEART/GREAT VESSELS: Heart is unremarkable for patient's age  No thoracic aortic aneurysm  MEDIASTINUM AND JESSE:  Unremarkable  CHEST WALL AND LOWER NECK:  Unremarkable  VISUALIZED STRUCTURES IN THE UPPER ABDOMEN:  Unremarkable  OSSEOUS STRUCTURES:  No acute fracture or destructive osseous lesion  Impression: 1 2 x 1 1 x 1 0 cm subpleural right lower lobe nodule, mildly decreased in size from the previous examination  1 4 x 1 2 cm irregular nodule abutting the major fissure, not significantly changed  Other smaller nodules are not significantly changed   Workstation performed: [id number]

## 2022-05-24 ENCOUNTER — HOSPITAL ENCOUNTER (EMERGENCY)
Facility: HOSPITAL | Age: 72
Discharge: HOME | End: 2022-05-24
Attending: EMERGENCY MEDICINE | Admitting: EMERGENCY MEDICINE
Payer: COMMERCIAL

## 2022-05-24 ENCOUNTER — TELEPHONE (OUTPATIENT)
Dept: OBGYN CLINIC | Facility: HOSPITAL | Age: 72
End: 2022-05-24

## 2022-05-24 ENCOUNTER — APPOINTMENT (EMERGENCY)
Dept: RADIOLOGY | Facility: HOSPITAL | Age: 72
End: 2022-05-24
Payer: COMMERCIAL

## 2022-05-24 VITALS
OXYGEN SATURATION: 100 % | BODY MASS INDEX: 29.16 KG/M2 | HEART RATE: 61 BPM | TEMPERATURE: 98.6 F | HEIGHT: 72 IN | DIASTOLIC BLOOD PRESSURE: 60 MMHG | SYSTOLIC BLOOD PRESSURE: 112 MMHG | RESPIRATION RATE: 18 BRPM

## 2022-05-24 DIAGNOSIS — S52.022A: ICD-10-CM
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
## 2022-05-24 DIAGNOSIS — T07.XXXA MULTIPLE ABRASIONS: ICD-10-CM

## 2022-05-24 PROCEDURE — 99284 EMERGENCY DEPT VISIT MOD MDM: CPT | Performed by: PHYSICIAN ASSISTANT

## 2022-05-24 PROCEDURE — 99283 EMERGENCY DEPT VISIT LOW MDM: CPT

## 2022-05-24 PROCEDURE — 73080 X-RAY EXAM OF ELBOW: CPT

## 2022-05-24 PROCEDURE — 73030 X-RAY EXAM OF SHOULDER: CPT

## 2022-05-24 RX ORDER — CEPHALEXIN 250 MG/1
500 CAPSULE ORAL ONCE
Status: COMPLETED | OUTPATIENT
Start: 2022-05-24 | End: 2022-05-24

## 2022-05-24 RX ORDER — GINSENG 100 MG
1 CAPSULE ORAL ONCE
Status: COMPLETED | OUTPATIENT
Start: 2022-05-24 | End: 2022-05-24

## 2022-05-24 RX ORDER — CEPHALEXIN 500 MG/1
500 CAPSULE ORAL EVERY 6 HOURS SCHEDULED
Qty: 27 CAPSULE | Refills: 0 | Status: SHIPPED | OUTPATIENT
Start: 2022-05-24 | End: 2022-05-31

## 2022-05-24 RX ADMIN — CEPHALEXIN 500 MG: 250 CAPSULE ORAL at 12:29

## 2022-05-24 RX ADMIN — BACITRACIN ZINC 1 LARGE APPLICATION: 500 OINTMENT TOPICAL at 12:29

## 2022-05-24 NOTE — TELEPHONE ENCOUNTER
Dr Jorge Tony had New patient opening on 5/26/22 at [facility] and he does have bone chip on elbow that is new per ER visit on 5/24/22  along with previous RTC injury

## 2022-05-24 NOTE — TELEPHONE ENCOUNTER
Spoke to patient  His ROM is limited and he also has pain in his elbow  Advised he should be evaluated for the injury after the fall and follow up with us after  He stated [place] is not convenient for him so he will go to the care now or ER closer to him and follow up after

## 2022-05-24 NOTE — DISCHARGE INSTRUCTIONS
Take antibiotics as prescribed  Take Tylenol as needed for pain  Use sling for immobilization and do not bear weight on the left arm until cleared by orthopedics  Please go to your orthopedic appointment in 2 days for follow-up

## 2022-05-24 NOTE — ED PROVIDER NOTES
History  Chief Complaint   Patient presents with    Fall     Patient fell 6 feet from a ladder in shed last night around 7 pm   Patient c/o left shoulder and left elbow pain  Patient denies loc  Patient takes aspirin daily  No head injury  73yo male with a history of hypertension and hyperlipidemia presenting after a fall yesterday evening  Patient was working on a ladder when he lost his footing causing him to call  He felt about 6 feet and injured his left arm  No head strike or LOC  Patient was able to ambulate after the fall  He currently complains of left shoulder and elbow pain  He has a known rotator cuff tear of the left shoulder and is awaiting surgical repair  His shoulder range of motion has decreased since the fall  No paresthesias  He is otherwise asymptomatic and denies any headache, neck pain, visual changes, vomiting, abdominal pain, back pain  He takes a baby aspirin daily  History provided by:  Patient   used: No    Fall  Mechanism of injury: fall    Incident location:  Outdoors  Time since incident:  1 day  Arrived directly from scene: no    Fall:     Fall occurred:  From a ladder    Height of fall:  6 feet    Impact surface:  Grass    Point of impact:  Buttocks and outstretched arms    Entrapped after fall: no    Suspicion of alcohol use: no    Suspicion of drug use: no    Tetanus status:  Up to date (2020)  Prior to arrival data:     Patient ambulatory at scene: yes      Loss of consciousness: no      Amnesic to event: no    Associated symptoms: no abdominal pain, no back pain, no chest pain, no difficulty breathing, no headaches, no loss of consciousness, no neck pain, no seizures and no vomiting        Prior to Admission Medications   Prescriptions Last Dose Informant Patient Reported? Taking?    Cholecalciferol (VITAMIN D3) 2000 UNITS capsule  Self Yes No   Sig: Take by mouth   FLUoxetine (PROzac) 20 mg capsule  Self No No   Sig: TAKE 1 CAPSULE BY MOUTH EVERY DAY   aspirin 81 MG tablet  Self Yes No   Sig: Take 81 mg by mouth daily   atenolol (TENORMIN) 50 mg tablet  Self No No   Sig: TAKE 1 TABLET DAILY   esomeprazole (NexIUM) 20 mg capsule  Self Yes No   Sig: Take 20 mg by mouth every morning before breakfast   olmesartan-hydrochlorothiazide (BENICAR HCT) 20-12 5 MG per tablet  Self No No   Sig: TAKE 1 TABLET DAILY   simvastatin (ZOCOR) 20 mg tablet  Self No No   Sig: TAKE 1 TABLET DAILY   tamsulosin (FLOMAX) 0 4 mg   No No   Sig: Take 1 capsule (0 4 mg total) by mouth in the morning      Facility-Administered Medications: None       Past Medical History:   Diagnosis Date    Arthritis 2012    Depression 2012    Hyperlipidemia     Hypertension        Past Surgical History:   Procedure Laterality Date    ABDOMINAL SURGERY      KNEE ARTHROSCOPY      NASAL SINUS SURGERY      MT ARTHROSCOPY SHOULDER SURGICAL BICEPS TENODESIS Right 1/25/2017    Procedure: ARTHROSCOPIC BICEPS TENODESIS;  Surgeon: Miller Mar MD;  [facility]: [facility];  Service: Orthopedics    MT SHLDR ARTHROSCOP,SURG,W/ROTAT CUFF REPR Right 1/25/2017    Procedure: SHOULDER ARTHROSCOPY ROTATOR CUFF REPAIR ;  Surgeon: Miller Mar MD;  [facility];  Service: Orthopedics    MT SURGICAL ARTHROSCOPY Rocío Landin DBRDMT 3+ Right 1/25/2017    Procedure: ARTHROSCOPY SHOULDER;  Surgeon: Miller Mar MD;  [facility];  Service: Orthopedics    TONSILLECTOMY         Family History   Problem Relation Age of Onset    Cancer Father         Brain tumor    Heart disease Father     Hyperlipidemia Father     Hypertension Father     Brain cancer Father     Migraines Mother     Cancer Brother         Melanoma     I have reviewed and agree with the history as documented      E-Cigarette/Vaping    E-Cigarette Use Never User      E-Cigarette/Vaping Substances    Nicotine No     THC No     CBD No     Flavoring No     Other No     Unknown No      Social History     Tobacco Use  Smoking status: Former Smoker     Packs/day: 0 25     Years: 30 00     Pack years: 7 50     Types: Cigarettes     Start date:      Quit date:      Years since quittin 4    Smokeless tobacco: Never Used   Vaping Use    Vaping Use: Never used   Substance Use Topics    Alcohol use: Not Currently     Alcohol/week: 0 0 standard drinks     Comment: social    Drug use: No       Review of Systems   Constitutional: Negative for chills and fever  HENT: Negative for drooling and voice change  Eyes: Negative for discharge and redness  Respiratory: Negative for shortness of breath and stridor  Cardiovascular: Negative for chest pain and leg swelling  Gastrointestinal: Negative for abdominal pain and vomiting  Musculoskeletal: Positive for arthralgias  Negative for back pain, neck pain and neck stiffness  Skin: Negative for color change and rash  Neurological: Negative for seizures, loss of consciousness, syncope and headaches  Psychiatric/Behavioral: Negative for confusion  The patient is not nervous/anxious  All other systems reviewed and are negative  Physical Exam  Physical Exam  Vitals and nursing note reviewed  Constitutional:       General: He is not in acute distress  Appearance: He is well-developed  He is not diaphoretic  HENT:      Head: Normocephalic and atraumatic  Comments: No external signs of head trauma     Right Ear: External ear normal       Left Ear: External ear normal    Eyes:      General: No scleral icterus  Right eye: No discharge  Left eye: No discharge  Conjunctiva/sclera: Conjunctivae normal    Neck:      Comments: No cervical spine tenderness  Cardiovascular:      Rate and Rhythm: Normal rate and regular rhythm  Heart sounds: Normal heart sounds  No murmur heard  Pulmonary:      Effort: Pulmonary effort is normal  No respiratory distress  Breath sounds: Normal breath sounds  No stridor  No wheezing or rales  Abdominal:      General: Bowel sounds are normal  There is no distension  Palpations: Abdomen is soft  Tenderness: There is no abdominal tenderness  There is no guarding  Musculoskeletal:         General: No deformity  Left shoulder: No swelling, deformity or bony tenderness  Decreased range of motion  Normal pulse  Left elbow: Swelling and laceration present  No deformity  Normal range of motion  Tenderness present in olecranon process  Cervical back: Normal range of motion and neck supple  No tenderness  Comments: Left shoulder: No deformity or soft tissue swelling  ROM is decreased  There is no bony tenderness to palpation of the joint  Left elbow: There is localized swelling over the olecranon process with associated tenderness  There is a small overlying 1cm laceration that has some serous drainage present  2+ radial pulse and sensation intact  No C/T/L spine tenderness   Skin:     General: Skin is warm and dry  Findings: Abrasion present  Comments: Multiple abrasions on upper extremities   Neurological:      General: No focal deficit present  Mental Status: He is alert  He is not disoriented  GCS: GCS eye subscore is 4  GCS verbal subscore is 5  GCS motor subscore is 6     Psychiatric:         Mood and Affect: Mood normal          Behavior: Behavior normal          Vital Signs  ED Triage Vitals   Temperature Pulse Respirations Blood Pressure SpO2   05/24/22 1054 05/24/22 1054 05/24/22 1054 05/24/22 1054 05/24/22 1054   98 6 °F (37 °C) (!) 52 18 (!) 192/80 99 %      Temp Source Heart Rate Source Patient Position - Orthostatic VS BP Location FiO2 (%)   05/24/22 1100 05/24/22 1100 05/24/22 1100 -- --   Oral Monitor Sitting        Pain Score       --                  Vitals:    05/24/22 1054 05/24/22 1100 05/24/22 1200   BP: (!) 192/80 113/58 112/60   Pulse: (!) 52 60 61   Patient Position - Orthostatic VS:  Sitting          Visual Acuity  Visual Acuity Flowsheet Row Most Recent Value   L Pupil Size (mm) 3   R Pupil Size (mm) 3          ED Medications  Medications   cephalexin (KEFLEX) capsule 500 mg (500 mg Oral Given 5/24/22 1229)   bacitracin topical ointment 1 large application (1 large application Topical Given 5/24/22 1229)       Diagnostic Studies  Results Reviewed     None                 XR shoulder 2+ views LEFT   Final Result by Paul Dolan MD (05/24 4672)      Possible chip or avulsion fracture of the olecranon with adjacent soft tissue swelling   Clinical correlation is recommended   No acute bone abnormality within the left shoulder               Workstation performed: [id number]         XR elbow 3+ views LEFT   ED Interpretation by Brittny Randall PA-C (05/24 1226)   Chip fracture at olecranon process      Final Result by Paul Dolan MD (05/24 0072)      Possible chip or avulsion fracture of the olecranon with adjacent soft tissue swelling   Clinical correlation is recommended   No acute bone abnormality within the left shoulder               Workstation performed: [id number]                    Procedures  Procedures         ED Course  ED Course as of 05/24/22 1743   Tue May 24, 2022   1226 Discussed case with Gabriel Ku, the on call orthopedic AP  He agrees with starting Keflex and providing sling with plan for outpatient f/u  MDM  Number of Diagnoses or Management Options  Fall from ladder, initial encounter: new and requires workup  Fracture of left olecranon process: new and requires workup  Multiple abrasions: new and requires workup  Diagnosis management comments: 73yo male presenting after a mechanical fall off a ladder yesterday evening  No head injury  C/o left shoulder and elbow pain  Known hx of rotator cuff tear  On exam, he has decreased ROM of the shoulder without deformity or swelling  There is localized swelling over the olecranon process  No other injuries seen on secondary survey   Cervical spine cleared via NEXUS criteria  X-rays of left shoulder and elbow obtained  There is a chip fracture at the olecranon process per my interpretation  Given overlying laceration with serous drainage, case discussed with ortho APLeonardo  Patient was given a sling for immobilization and started on a course of Keflex per ortho recommendations  He has an ortho appt scheduled in 2 days  Patient expressed understanding and is agreeable to plan  Patient discharged in stable condition  Amount and/or Complexity of Data Reviewed  Tests in the radiology section of CPT®: reviewed and ordered  Discuss the patient with other providers: yes  Independent visualization of images, tracings, or specimens: yes    Risk of Complications, Morbidity, and/or Mortality  Presenting problems: moderate  Diagnostic procedures: low  Management options: low    Patient Progress  Patient progress: stable      Disposition  Final diagnoses:   Fall from ladder, initial encounter   Fracture of left olecranon process   Multiple abrasions     Time reflects when diagnosis was documented in both MDM as applicable and the Disposition within this note     Time User Action Codes Description Comment    5/24/2022 12:27  [facility], [facility] [W11  XXXA] Fall from ladder, initial encounter     5/24/2022 12:27  [facility], [address] [facility] Fracture of left olecranon process     5/24/2022 12:27  [facility], [address] [facility]  XXXA] Multiple abrasions       ED Disposition     ED Disposition   Discharge    Condition   Stable    Date/Time   Tue May 24, 2022 12:27 PM    Comment   Addy Codyohana discharge to home/self care                 Follow-up Information     Follow up With Specialties Details Why Contact Info Additional [address] [facility] Schedule an appointment as soon as possible for a visit   [address] [address] [facility], [address] [address] [facility], [facility], [address] [facility], [address] [id number] [facility] Emergency Department Emergency Medicine  If symptoms worsen [facility] Emergency Department, [address] [facility], [facility], [address] [place] [address], [id number]          Discharge Medication List as of 5/24/2022 12:29 PM      START taking these medications    Details   cephalexin (KEFLEX) 500 mg capsule Take 1 capsule (500 mg total) by mouth every 6 (six) hours for 7 days, Starting Tue 5/24/2022, Until Tue 5/31/2022, Normal         CONTINUE these medications which have NOT CHANGED    Details   aspirin 81 MG tablet Take 81 mg by mouth daily, Until Discontinued, Historical Med      atenolol (TENORMIN) 50 mg tablet TAKE 1 TABLET DAILY, Normal      Cholecalciferol (VITAMIN D3) 2000 UNITS capsule Take by mouth, Starting Mon 6/6/2016, Historical Med      esomeprazole (NexIUM) 20 mg capsule Take 20 mg by mouth every morning before breakfast, Until Discontinued, Historical Med      FLUoxetine (PROzac) 20 mg capsule TAKE 1 CAPSULE BY MOUTH EVERY DAY, Normal      olmesartan-hydrochlorothiazide (BENICAR HCT) 20-12 5 MG per tablet TAKE 1 TABLET DAILY, Normal      simvastatin (ZOCOR) 20 mg tablet TAKE 1 TABLET DAILY, Normal      tamsulosin (FLOMAX) 0 4 mg Take 1 capsule (0 4 mg total) by mouth in the morning, Starting Tue 5/10/2022, Normal             No discharge procedures on file      PDMP Review     None          ED Provider  Electronically Signed by           Katty Mary PA-C  05/24/22 2450

## 2022-05-24 NOTE — ED NOTES
Notified provider [facility] fell 6 feet from ladder  Provider will assess patient to determine if trauma eval needed         Ruddy Portillo RN  05/24/22 6817

## 2022-05-24 NOTE — TELEPHONE ENCOUNTER
Patient has a left RTC tear and is awaiting surgery  Patient states he fell off a ladder yesterday and has increased pain in the left shoulder, as well as the left elbow  I am unable to add this patient to Dr Milagros Layne schedule today  Scheduled with [facility] today  Transferred to triage nurse

## 2022-05-26 ENCOUNTER — OFFICE VISIT (OUTPATIENT)
Dept: OBGYN CLINIC | Facility: CLINIC | Age: 72
End: 2022-05-26
Payer: COMMERCIAL

## 2022-05-26 VITALS
WEIGHT: 216.8 LBS | SYSTOLIC BLOOD PRESSURE: 127 MMHG | HEIGHT: 72 IN | BODY MASS INDEX: 29.36 KG/M2 | HEART RATE: 66 BPM | DIASTOLIC BLOOD PRESSURE: 90 MMHG

## 2022-05-26 DIAGNOSIS — M25.512 CHRONIC LEFT SHOULDER PAIN: ICD-10-CM

## 2022-05-26 DIAGNOSIS — M25.522 PAIN IN LEFT ELBOW: ICD-10-CM

## 2022-05-26 DIAGNOSIS — S50.02XA CONTUSION OF LEFT ELBOW, INITIAL ENCOUNTER: ICD-10-CM

## 2022-05-26 DIAGNOSIS — S46.012D TRAUMATIC COMPLETE TEAR OF LEFT ROTATOR CUFF, SUBSEQUENT ENCOUNTER: Primary | ICD-10-CM

## 2022-05-26 DIAGNOSIS — G89.29 CHRONIC LEFT SHOULDER PAIN: ICD-10-CM

## 2022-05-26 PROCEDURE — 99214 OFFICE O/P EST MOD 30 MIN: CPT | Performed by: ORTHOPAEDIC SURGERY

## 2022-05-26 PROCEDURE — 3008F BODY MASS INDEX DOCD: CPT | Performed by: ORTHOPAEDIC SURGERY

## 2022-05-26 PROCEDURE — 3080F DIAST BP >= 90 MM HG: CPT | Performed by: ORTHOPAEDIC SURGERY

## 2022-05-26 PROCEDURE — 1160F RVW MEDS BY RX/DR IN RCRD: CPT | Performed by: ORTHOPAEDIC SURGERY

## 2022-05-26 PROCEDURE — 3074F SYST BP LT 130 MM HG: CPT | Performed by: ORTHOPAEDIC SURGERY

## 2022-05-26 PROCEDURE — 1036F TOBACCO NON-USER: CPT | Performed by: ORTHOPAEDIC SURGERY

## 2022-05-26 NOTE — PROGRESS NOTES
Patient Name:  Cristina Leon  MRN:  [id number]    [address] [place] [address] [place] Kun[place]     1  Traumatic complete tear of left rotator cuff, subsequent encounter  -     Ambulatory Referral to Physical Therapy; Future    2  Pain in left elbow    3  Contusion of left elbow, initial encounter    4  Chronic left shoulder pain        73year old male with Left shoulder rotator cuff tear, Left elbow contusion  In regards to Left elbow, reviewed x-rays with patient and discussed likely no acute fracture as range of motion is within normal limits and pain is decreased since injury  Advised patient to continue with gentle ROM exercises and ice application for pain relief  In regards to Left shoulder, strongly advised patient to start in outpatient PT to work on obtaining full passive and active range of motion secondary to moderate stiffness  If patient wishes to proceed with arthroscopic rotator cuff repair in the future as previously planned, ROM will need to improve as stiffness is a concern post operatively  Advised patient to follow up with PCP and pulmonology for continued evaluation of abnormal lung finding  Verbalized understanding of the above and will follow up in office in 8 weeks for reevaluation of Left shoulder and elbow  History of the Present Illness   Cristina Leon is a 73 y o  male with Left shoulder rotator cuff tear  Today, patient reports had a recent mechanical fall off a 6ft ladder onto buttocks and Left elbow  He notes difficulty with range of motion of the shoulder as compared to previous exam/since outpatient PT  Patient was originally scheduled for Left rotator cuff repair, but needed follow up with PCP and pulmonology secondary to findings of chest XR; reports they want to postpone surgery until clear diagnosis of abnormal lung finding  Review of Systems     Review of Systems   Constitutional: Negative for chills and fever  HENT: Negative for ear pain and sore throat      Eyes: Negative for pain and visual disturbance  Respiratory: Negative for cough and shortness of breath  Cardiovascular: Negative for chest pain and palpitations  Gastrointestinal: Negative for abdominal pain and vomiting  Genitourinary: Negative for dysuria and hematuria  Musculoskeletal: Negative for arthralgias and back pain  Skin: Negative for color change and rash  Neurological: Negative for seizures and syncope  All other systems reviewed and are negative  Physical Exam     /90   Pulse 66   Ht 6' (1 829 m)   Wt 98 3 kg (216 lb 12 8 oz)   BMI 29 40 kg/m²     Left elbow:  Approximately 8mm superficial laceration over olecranon  Range of motion 0-140 with mild discomfort      Left Shoulder: Active range of motion   70 degrees forward flexion  70 degrees abduction  30 degrees external rotation   L4 internal rotation      Passive range of motion   90 degrees of forward flexion with firm endpoint and moderate stiffness   There is 4/5 strength with external rotation testing at the side  Empty can testing is positive   Belly press test elicits pain with strength well preserved  The patient is neurovascularly intact distally in the extremity  Data Review     I have personally reviewed pertinent films in PACS, and my interpretation follows  X-rays taken of Left elbow demonstrate possible calcification at olecranon  No obvious fracture or dislocation noted  Again reviewed previous MRI of Left shoulder demonstrating full thickness supraspinatus tear with extension into infraspinatus  Social History     Tobacco Use    Smoking status: Former Smoker     Packs/day: 0 25     Years: 30 00     Pack years: 7 50     Types: Cigarettes     Start date:      Quit date:      Years since quittin 4    Smokeless tobacco: Never Used   Vaping Use    Vaping Use: Never used   Substance Use Topics    Alcohol use: Not Currently     Alcohol/week: 0 0 standard drinks     Comment: social    Drug use:  No Procedures  Liliana Hall,

## 2022-06-06 ENCOUNTER — OFFICE VISIT (OUTPATIENT)
Dept: INTERNAL MEDICINE CLINIC | Facility: CLINIC | Age: 72
End: 2022-06-06
Payer: COMMERCIAL

## 2022-06-06 VITALS
WEIGHT: 220 LBS | HEIGHT: 72 IN | BODY MASS INDEX: 29.8 KG/M2 | HEART RATE: 70 BPM | TEMPERATURE: 98 F | DIASTOLIC BLOOD PRESSURE: 70 MMHG | RESPIRATION RATE: 16 BRPM | SYSTOLIC BLOOD PRESSURE: 132 MMHG | OXYGEN SATURATION: 99 %

## 2022-06-06 DIAGNOSIS — R73.01 IMPAIRED FASTING GLUCOSE: ICD-10-CM

## 2022-06-06 DIAGNOSIS — I10 BENIGN HYPERTENSION: Primary | ICD-10-CM

## 2022-06-06 DIAGNOSIS — R91.8 PULMONARY NODULES: ICD-10-CM

## 2022-06-06 DIAGNOSIS — E78.2 MIXED HYPERLIPIDEMIA: ICD-10-CM

## 2022-06-06 PROCEDURE — 3725F SCREEN DEPRESSION PERFORMED: CPT | Performed by: INTERNAL MEDICINE

## 2022-06-06 PROCEDURE — 1160F RVW MEDS BY RX/DR IN RCRD: CPT | Performed by: INTERNAL MEDICINE

## 2022-06-06 PROCEDURE — 3075F SYST BP GE 130 - 139MM HG: CPT | Performed by: INTERNAL MEDICINE

## 2022-06-06 PROCEDURE — 3008F BODY MASS INDEX DOCD: CPT | Performed by: INTERNAL MEDICINE

## 2022-06-06 PROCEDURE — 99214 OFFICE O/P EST MOD 30 MIN: CPT | Performed by: INTERNAL MEDICINE

## 2022-06-06 PROCEDURE — 1036F TOBACCO NON-USER: CPT | Performed by: INTERNAL MEDICINE

## 2022-06-06 PROCEDURE — 3078F DIAST BP <80 MM HG: CPT | Performed by: INTERNAL MEDICINE

## 2022-06-06 RX ORDER — MELOXICAM 15 MG/1
TABLET ORAL
COMMUNITY

## 2022-06-06 NOTE — PROGRESS NOTES
Assessment/Plan:     Chronic problems are stable  Continue current medications  Continue follow-up with Pulmonary and Orthopedics  Continue with diet and exercise, especially being careful now that he is retired  Quality Measures:       Return in about 4 months (around 10/6/2022)  No problem-specific Assessment & Plan notes found for this encounter  Diagnoses and all orders for this visit:    Benign hypertension  -     Comprehensive metabolic panel; Future  -     CBC and differential; Future  -     Lipid panel; Future    Impaired fasting glucose  -     Hemoglobin A1C; Future    Mixed hyperlipidemia    Pulmonary nodules    Other orders  -     meloxicam (MOBIC) 15 mg tablet; meloxicam 15 mg tablet   TAKE 1 TABLET BY MOUTH EVERY DAY        Subjective:      Patient ID: Clint Barksdale is a 73 y o  male  Patient comes in today for routine follow-up and states he is doing okay  He is still undergoing pulmonary workup for his pulmonary nodule  The shoulder surgery has been postponed because of this  His blood pressure remains controlled  Sugars controlled  Cholesterol up slightly  He is now retired so trying to keep busy without bothering his shoulder  No other complaints today  No further additions to his history        ALLERGIES:  Allergies   Allergen Reactions    Amlodipine Swelling    Lisinopril      Other reaction(s): Cough  Other reaction(s): Cough       CURRENT MEDICATIONS:    Current Outpatient Medications:     aspirin 81 MG tablet, Take 81 mg by mouth daily, Disp: , Rfl:     atenolol (TENORMIN) 50 mg tablet, TAKE 1 TABLET DAILY, Disp: 90 tablet, Rfl: 3    Cholecalciferol (VITAMIN D3) 2000 UNITS capsule, Take by mouth, Disp: , Rfl:     esomeprazole (NexIUM) 20 mg capsule, Take 20 mg by mouth every morning before breakfast, Disp: , Rfl:     FLUoxetine (PROzac) 20 mg capsule, TAKE 1 CAPSULE BY MOUTH EVERY DAY, Disp: 90 capsule, Rfl: 3    meloxicam (MOBIC) 15 mg tablet, meloxicam 15 mg tablet  TAKE 1 TABLET BY MOUTH EVERY DAY, Disp: , Rfl:     olmesartan-hydrochlorothiazide (BENICAR HCT) 20-12 5 MG per tablet, TAKE 1 TABLET DAILY, Disp: 90 tablet, Rfl: 3    simvastatin (ZOCOR) 20 mg tablet, TAKE 1 TABLET DAILY, Disp: 90 tablet, Rfl: 3    tamsulosin (FLOMAX) 0 4 mg, Take 1 capsule (0 4 mg total) by mouth in the morning, Disp: 30 capsule, Rfl: 5    ACTIVE PROBLEM LIST:  Patient Active Problem List   Diagnosis    Complete tear of left rotator cuff    Biceps tendinitis    Benign hypertension    Allergic rhinitis    Hyperlipidemia    Depression    Impaired fasting glucose    Vitamin D deficiency    Bilateral primary osteoarthritis of knee    Primary osteoarthritis of right knee    Primary osteoarthritis of left knee    Chronic venous insufficiency       PAST MEDICAL HISTORY:  Past Medical History:   Diagnosis Date    Arthritis 2012    Depression 2012    Hyperlipidemia     Hypertension        PAST SURGICAL HISTORY:  Past Surgical History:   Procedure Laterality Date    ABDOMINAL SURGERY      KNEE ARTHROSCOPY      NASAL SINUS SURGERY      [place] ARTHROSCOPY SHOULDER SURGICAL BICEPS TENODESIS Right 1/25/2017    Procedure: ARTHROSCOPIC BICEPS TENODESIS;  Surgeon: Mya Valle MD;  Location: [facility];  Service: Orthopedics    [place] SHLDR ARTHROSCOP,SURG,W/ROTAT CUFF REPR Right 1/25/2017    Procedure: SHOULDER ARTHROSCOPY ROTATOR CUFF REPAIR ;  Surgeon: Mya Valle MD;  Location[facility];  Service: Orthopedics    [place] SURGICAL ARTHROSCOPY Osmin Randi DBRDMT 3+ Right 1/25/2017    Procedure: ARTHROSCOPY SHOULDER;  Surgeon: Mya Valle MD;  [facility];  Service: Orthopedics    TONSILLECTOMY         FAMILY HISTORY:  Family History   Problem Relation Age of Onset    Cancer Father         Brain tumor    Heart disease Father     Hyperlipidemia Father     Hypertension Father     Brain cancer Father     Migraines Mother     Cancer Brother         Melanoma SOCIAL HISTORY:  Social History     Socioeconomic History    Marital status: /Civil Union     Spouse name: Not on file    Number of children: Not on file    Years of education: Not on file    Highest education level: Not on file   Occupational History    Occupation: Part time   Tobacco Use    Smoking status: Former Smoker     Packs/day: 0 25     Years: 30 00     Pack years: 7 50     Types: Cigarettes     Start date:      Quit date:      Years since quittin 4    Smokeless tobacco: Never Used   Vaping Use    Vaping Use: Never used   Substance and Sexual Activity    Alcohol use: Not Currently     Alcohol/week: 0 0 standard drinks     Comment: social    Drug use: No    Sexual activity: Yes     Partners: Female     Birth control/protection: Condom Male     Comment: Denied high risk sexual behavior   Other Topics Concern    Not on file   Social History Narrative    Not on file     Social Determinants of Health     Financial Resource Strain: Not on file   Food Insecurity: Not on file   Transportation Needs: Not on file   Physical Activity: Not on file   Stress: Not on file   Social Connections: Not on file   Intimate Partner Violence: Not on file   Housing Stability: Not on file       Review of Systems   Respiratory: Negative for shortness of breath  Cardiovascular: Negative for chest pain  Gastrointestinal: Negative for abdominal pain  Objective:  Vitals:    22 0757   BP: 132/70   BP Location: Left arm   Patient Position: Sitting   Cuff Size: Standard   Pulse: 70   Resp: 16   Temp: 98 °F (36 7 °C)   TempSrc: Tympanic   SpO2: 99%   Weight: 99 8 kg (220 lb)   Height: 6' (1 829 m)     Body mass index is 29 84 kg/m²  Physical Exam  Vitals and nursing note reviewed  Constitutional:       Appearance: He is well-developed  Cardiovascular:      Rate and Rhythm: Normal rate and regular rhythm  Heart sounds: Normal heart sounds     Pulmonary:      Effort: Pulmonary effort is normal       Breath sounds: Normal breath sounds  Abdominal:      Palpations: Abdomen is soft  Tenderness: There is no abdominal tenderness  Neurological:      Mental Status: He is alert and oriented to person, place, and time  RESULTS:    No results found for this or any previous visit (from the past 1008 hour(s))  This note was created with voice recognition software  Phonic, grammatical and spelling errors may be present within the note as a result

## 2022-06-23 NOTE — MISCELLANEOUS
Message  Return to work or school:   Ellyn Eisenberg is under my professional care  He was seen in my office on 3/9/17   He is able to return to work on  3/27/17    He is able to work with limitations (no lifting right arm, no repetitive push/pull right arm, no overhead use right arm)           Future Appointments    Signatures   Electronically signed by : CHRISTOPHER Solis ; Mar  9 2017  2:26PM EST                       (Author)
Unable to Assess

## 2022-07-06 ENCOUNTER — HOSPITAL ENCOUNTER (OUTPATIENT)
Dept: CT IMAGING | Facility: HOSPITAL | Age: 72
Discharge: HOME | End: 2022-07-06
Attending: INTERNAL MEDICINE
Payer: COMMERCIAL

## 2022-07-06 DIAGNOSIS — R93.89 ABNORMAL CT OF THE CHEST: ICD-10-CM

## 2022-07-06 PROCEDURE — 71250 CT THORAX DX C-: CPT

## 2022-07-06 PROCEDURE — G1004 CDSM NDSC: HCPCS

## 2022-07-12 ENCOUNTER — OFFICE VISIT (OUTPATIENT)
Dept: PULMONOLOGY | Facility: CLINIC | Age: 72
End: 2022-07-12
Payer: COMMERCIAL

## 2022-07-12 VITALS
HEART RATE: 57 BPM | BODY MASS INDEX: 30.2 KG/M2 | WEIGHT: 223 LBS | SYSTOLIC BLOOD PRESSURE: 160 MMHG | TEMPERATURE: 98.1 F | DIASTOLIC BLOOD PRESSURE: 80 MMHG | HEIGHT: 72 IN | OXYGEN SATURATION: 96 %

## 2022-07-12 DIAGNOSIS — R93.89 ABNORMAL CT OF THE CHEST: Primary | ICD-10-CM

## 2022-07-12 DIAGNOSIS — R91.8 PULMONARY NODULES: ICD-10-CM

## 2022-07-12 PROCEDURE — 1160F RVW MEDS BY RX/DR IN RCRD: CPT | Performed by: INTERNAL MEDICINE

## 2022-07-12 PROCEDURE — 3077F SYST BP >= 140 MM HG: CPT | Performed by: INTERNAL MEDICINE

## 2022-07-12 PROCEDURE — 99214 OFFICE O/P EST MOD 30 MIN: CPT | Performed by: INTERNAL MEDICINE

## 2022-07-12 PROCEDURE — 3079F DIAST BP 80-89 MM HG: CPT | Performed by: INTERNAL MEDICINE

## 2022-07-12 NOTE — PROGRESS NOTES
Assessment/Plan:   Diagnoses and all orders for this visit:    Abnormal CT of the chest  -     CT chest without contrast; Future    Pulmonary nodules      CT of the chest report and images were reviewed with the patient the right lower lobe lung nodule, currently 0 9 centimeters, last CT was 1 2 x 1 cm, which has decreased in size from the prior CT which was around 1 5 cm then  Also another right lower lobe lung nodule subpleural, was 1 centimeter in the prior CT scan is currently 0 8 centimeters  Given the decrease in the size of both the lung nodules, likely benign will follow it up in 6 months CT scan and for total resolution  Report and images were reviewed with the patient  Also discussed that he can have the shoulder surgery set up able can clear him for the surgery   Follow-up in 6 months with the CT of the chest or p r n  earlier as needed  Return in about 6 months (around 1/12/2023)  All questions are answered to the patient's satisfaction and understanding  He verbalizes understanding  He is encouraged to call with any further questions or concerns  Portions of the record may have been created with voice recognition software  Occasional wrong word or "sound a like" substitutions may have occurred due to the inherent limitations of voice recognition software  Read the chart carefully and recognize, using context, where substitutions have occurred  Electronically Signed by Niki Kaur MD    ______________________________________________________________________    Chief Complaint:   Chief Complaint   Patient presents with    Follow-up       Patient ID: Arden Avalos is a 73 y o  y o  male has a past medical history of Arthritis (2012), Depression (2012), Hyperlipidemia, and Hypertension  7/12/2022  Patient presents today for follow-up visit    Shana Kuhn is a very pleasant 72-year-old gentleman, with less than 10 pack-year smoking history who quit approximately 40 years ago he states, works as a  in [address] [place] [address], was scheduled for a surgery for torn rotator cuff had a preop chest x-ray done found to have opacities on the right lung for which a CT scan of the chest was ordered and he is here for follow-up  No family history of any lung cancer, he does not have any personal history of cancer diagnosed   After his 1st CT scan he was given an antibiotic which she has completed he also had a repeat CT of the chest and found to have decreased in size of 1 of the lung nodules, he is here with a repeat CT of the chest in 2 months follow-up  Review of Systems   Constitutional: Negative  HENT: Negative  Eyes: Negative  Respiratory: Negative  Cardiovascular: Negative  Gastrointestinal: Negative  Endocrine: Negative  Genitourinary: Negative  Musculoskeletal: Negative  Allergic/Immunologic: Negative  Neurological: Negative  Hematological: Negative  Psychiatric/Behavioral: Negative  Smoking history: He reports that he quit smoking about 40 years ago  His smoking use included cigarettes  He started smoking about 55 years ago  He has a 7 50 pack-year smoking history   He has never used smokeless tobacco     The following portions of the patient's history were reviewed and updated as appropriate: allergies, current medications, past family history, past medical history, past social history, past surgical history and problem list     Immunization History   Administered Date(s) Administered    COVID-19 J&J (iCoolhunt) vaccine 0 5 mL 03/10/2021, 03/10/2021    INFLUENZA 12/04/2001, 11/08/2002    Influenza Quadrivalent, 6-35 Months IM 11/18/2015    Influenza, high dose seasonal 0 7 mL 10/19/2018, 02/10/2021, 12/10/2021    Pneumococcal Conjugate 13-Valent 07/08/2019    Pneumococcal Polysaccharide PPV23 07/28/2020    Tdap 07/19/2020     Current Outpatient Medications   Medication Sig Dispense Refill    aspirin 81 MG tablet Take 81 mg by mouth daily      atenolol (TENORMIN) 50 mg tablet TAKE 1 TABLET DAILY 90 tablet 3    Cholecalciferol (VITAMIN D3) 2000 UNITS capsule Take by mouth      esomeprazole (NexIUM) 20 mg capsule Take 20 mg by mouth every morning before breakfast      FLUoxetine (PROzac) 20 mg capsule TAKE 1 CAPSULE BY MOUTH EVERY DAY 90 capsule 3    meloxicam (MOBIC) 15 mg tablet meloxicam 15 mg tablet   TAKE 1 TABLET BY MOUTH EVERY DAY      olmesartan-hydrochlorothiazide (BENICAR HCT) 20-12 5 MG per tablet TAKE 1 TABLET DAILY 90 tablet 3    simvastatin (ZOCOR) 20 mg tablet TAKE 1 TABLET DAILY 90 tablet 3    tamsulosin (FLOMAX) 0 4 mg Take 1 capsule (0 4 mg total) by mouth in the morning 30 capsule 5     No current facility-administered medications for this visit  Allergies: Amlodipine and Lisinopril    Objective:  Vitals:    07/12/22 0817   BP: 160/80   Pulse: 57   Temp: 98 1 °F (36 7 °C)   SpO2: 96%   Weight: 101 kg (223 lb)   Height: 6' (1 829 m)   Oxygen Therapy  SpO2: 96 %    Wt Readings from Last 3 Encounters:   07/12/22 101 kg (223 lb)   06/06/22 99 8 kg (220 lb)   05/26/22 98 3 kg (216 lb 12 8 oz)     Body mass index is 30 24 kg/m²  Physical Exam  Vitals and nursing note reviewed  Constitutional:       Appearance: He is well-developed  HENT:      Head: Normocephalic and atraumatic  Eyes:      Conjunctiva/sclera: Conjunctivae normal       Pupils: Pupils are equal, round, and reactive to light  Neck:      Thyroid: No thyromegaly  Vascular: No JVD  Cardiovascular:      Rate and Rhythm: Normal rate and regular rhythm  Heart sounds: Normal heart sounds  No murmur heard  No friction rub  No gallop  Pulmonary:      Effort: Pulmonary effort is normal  No respiratory distress  Breath sounds: Normal breath sounds  No wheezing or rales  Chest:      Chest wall: No tenderness  Musculoskeletal:         General: No tenderness or deformity  Normal range of motion  Cervical back: Normal range of motion and neck supple  Lymphadenopathy:      Cervical: No cervical adenopathy  Skin:     General: Skin is warm and dry  Neurological:      Mental Status: He is alert and oriented to person, place, and time             Diagnostics:  I have personally reviewed pertinent films in PACS

## 2022-08-25 ENCOUNTER — OFFICE VISIT (OUTPATIENT)
Dept: LAB | Facility: HOSPITAL | Age: 72
End: 2022-08-25
Payer: COMMERCIAL

## 2022-08-25 DIAGNOSIS — Z01.89 ENCOUNTER FOR OTHER SPECIFIED SPECIAL EXAMINATIONS: ICD-10-CM

## 2022-08-25 PROCEDURE — 93005 ELECTROCARDIOGRAM TRACING: CPT

## 2022-08-26 LAB
ATRIAL RATE: 56 BPM
P AXIS: -13 DEGREES
PR INTERVAL: 216 MS
QRS AXIS: 3 DEGREES
QRSD INTERVAL: 86 MS
QT INTERVAL: 388 MS
QTC INTERVAL: 374 MS
T WAVE AXIS: 26 DEGREES
VENTRICULAR RATE: 56 BPM

## 2022-08-26 PROCEDURE — 93010 ELECTROCARDIOGRAM REPORT: CPT | Performed by: INTERNAL MEDICINE

## 2022-09-07 ENCOUNTER — APPOINTMENT (OUTPATIENT)
Dept: LAB | Facility: HOSPITAL | Age: 72
End: 2022-09-07
Payer: COMMERCIAL

## 2022-09-07 DIAGNOSIS — I10 BENIGN HYPERTENSION: ICD-10-CM

## 2022-09-07 DIAGNOSIS — Z01.89 ENCOUNTER FOR OTHER SPECIFIED SPECIAL EXAMINATIONS: ICD-10-CM

## 2022-09-07 DIAGNOSIS — R73.01 IMPAIRED FASTING GLUCOSE: ICD-10-CM

## 2022-09-07 LAB
APTT PPP: 27 SECONDS (ref 23–37)
BASOPHILS # BLD AUTO: 0.09 THOUSANDS/ΜL (ref 0–0.1)
BASOPHILS NFR BLD AUTO: 1 % (ref 0–1)
EOSINOPHIL # BLD AUTO: 0.22 THOUSAND/ΜL (ref 0–0.61)
EOSINOPHIL NFR BLD AUTO: 3 % (ref 0–6)
ERYTHROCYTE [DISTWIDTH] IN BLOOD BY AUTOMATED COUNT: 15.3 % (ref 11.6–15.1)
HCT VFR BLD AUTO: 45.2 % (ref 36.5–49.3)
HGB BLD-MCNC: 15.4 G/DL (ref 12–17)
IMM GRANULOCYTES # BLD AUTO: 0.02 THOUSAND/UL (ref 0–0.2)
IMM GRANULOCYTES NFR BLD AUTO: 0 % (ref 0–2)
INR PPP: 0.94 (ref 0.84–1.19)
LYMPHOCYTES # BLD AUTO: 1.73 THOUSANDS/ΜL (ref 0.6–4.47)
LYMPHOCYTES NFR BLD AUTO: 23 % (ref 14–44)
MCH RBC QN AUTO: 30 PG (ref 26.8–34.3)
MCHC RBC AUTO-ENTMCNC: 34.1 G/DL (ref 31.4–37.4)
MCV RBC AUTO: 88 FL (ref 82–98)
MONOCYTES # BLD AUTO: 0.72 THOUSAND/ΜL (ref 0.17–1.22)
MONOCYTES NFR BLD AUTO: 10 % (ref 4–12)
NEUTROPHILS # BLD AUTO: 4.67 THOUSANDS/ΜL (ref 1.85–7.62)
NEUTS SEG NFR BLD AUTO: 63 % (ref 43–75)
NRBC BLD AUTO-RTO: 0 /100 WBCS
PLATELET # BLD AUTO: 365 THOUSANDS/UL (ref 149–390)
PMV BLD AUTO: 10.2 FL (ref 8.9–12.7)
PROTHROMBIN TIME: 12.4 SECONDS (ref 11.6–14.5)
RBC # BLD AUTO: 5.14 MILLION/UL (ref 3.88–5.62)
WBC # BLD AUTO: 7.45 THOUSAND/UL (ref 4.31–10.16)

## 2022-09-07 PROCEDURE — 85025 COMPLETE CBC W/AUTO DIFF WBC: CPT

## 2022-09-07 PROCEDURE — 36415 COLL VENOUS BLD VENIPUNCTURE: CPT

## 2022-09-07 PROCEDURE — 85730 THROMBOPLASTIN TIME PARTIAL: CPT

## 2022-09-07 PROCEDURE — 85610 PROTHROMBIN TIME: CPT

## 2022-09-20 ENCOUNTER — APPOINTMENT (OUTPATIENT)
Dept: LAB | Facility: HOSPITAL | Age: 72
End: 2022-09-20
Payer: COMMERCIAL

## 2022-09-20 LAB
ALBUMIN SERPL BCP-MCNC: 3.5 G/DL (ref 3.5–5)
ALP SERPL-CCNC: 49 U/L (ref 46–116)
ALT SERPL W P-5'-P-CCNC: 36 U/L (ref 12–78)
ANION GAP SERPL CALCULATED.3IONS-SCNC: 5 MMOL/L (ref 4–13)
AST SERPL W P-5'-P-CCNC: 19 U/L (ref 5–45)
BASOPHILS # BLD AUTO: 0.1 THOUSANDS/ΜL (ref 0–0.1)
BASOPHILS NFR BLD AUTO: 1 % (ref 0–1)
BILIRUB SERPL-MCNC: 0.94 MG/DL (ref 0.2–1)
BUN SERPL-MCNC: 25 MG/DL (ref 5–25)
CALCIUM SERPL-MCNC: 9.1 MG/DL (ref 8.3–10.1)
CHLORIDE SERPL-SCNC: 104 MMOL/L (ref 96–108)
CHOLEST SERPL-MCNC: 180 MG/DL
CO2 SERPL-SCNC: 30 MMOL/L (ref 21–32)
CREAT SERPL-MCNC: 1.23 MG/DL (ref 0.6–1.3)
EOSINOPHIL # BLD AUTO: 0.27 THOUSAND/ΜL (ref 0–0.61)
EOSINOPHIL NFR BLD AUTO: 3 % (ref 0–6)
ERYTHROCYTE [DISTWIDTH] IN BLOOD BY AUTOMATED COUNT: 15.9 % (ref 11.6–15.1)
EST. AVERAGE GLUCOSE BLD GHB EST-MCNC: 131 MG/DL
GFR SERPL CREATININE-BSD FRML MDRD: 59 ML/MIN/1.73SQ M
GLUCOSE P FAST SERPL-MCNC: 112 MG/DL (ref 65–99)
HBA1C MFR BLD: 6.2 %
HCT VFR BLD AUTO: 43.5 % (ref 36.5–49.3)
HDLC SERPL-MCNC: 42 MG/DL
HGB BLD-MCNC: 15.3 G/DL (ref 12–17)
IMM GRANULOCYTES # BLD AUTO: 0.02 THOUSAND/UL (ref 0–0.2)
IMM GRANULOCYTES NFR BLD AUTO: 0 % (ref 0–2)
LDLC SERPL CALC-MCNC: 100 MG/DL (ref 0–100)
LYMPHOCYTES # BLD AUTO: 2.37 THOUSANDS/ΜL (ref 0.6–4.47)
LYMPHOCYTES NFR BLD AUTO: 30 % (ref 14–44)
MCH RBC QN AUTO: 31.4 PG (ref 26.8–34.3)
MCHC RBC AUTO-ENTMCNC: 35.2 G/DL (ref 31.4–37.4)
MCV RBC AUTO: 89 FL (ref 82–98)
MONOCYTES # BLD AUTO: 0.83 THOUSAND/ΜL (ref 0.17–1.22)
MONOCYTES NFR BLD AUTO: 11 % (ref 4–12)
NEUTROPHILS # BLD AUTO: 4.28 THOUSANDS/ΜL (ref 1.85–7.62)
NEUTS SEG NFR BLD AUTO: 55 % (ref 43–75)
NONHDLC SERPL-MCNC: 138 MG/DL
NRBC BLD AUTO-RTO: 0 /100 WBCS
PLATELET # BLD AUTO: 342 THOUSANDS/UL (ref 149–390)
PMV BLD AUTO: 10.8 FL (ref 8.9–12.7)
POTASSIUM SERPL-SCNC: 4.4 MMOL/L (ref 3.5–5.3)
PROT SERPL-MCNC: 6.6 G/DL (ref 6.4–8.4)
RBC # BLD AUTO: 4.88 MILLION/UL (ref 3.88–5.62)
SODIUM SERPL-SCNC: 139 MMOL/L (ref 135–147)
TRIGL SERPL-MCNC: 192 MG/DL
WBC # BLD AUTO: 7.87 THOUSAND/UL (ref 4.31–10.16)

## 2022-09-20 PROCEDURE — 80053 COMPREHEN METABOLIC PANEL: CPT

## 2022-09-20 PROCEDURE — 85025 COMPLETE CBC W/AUTO DIFF WBC: CPT

## 2022-09-20 PROCEDURE — 36415 COLL VENOUS BLD VENIPUNCTURE: CPT

## 2022-09-20 PROCEDURE — 80061 LIPID PANEL: CPT

## 2022-09-20 PROCEDURE — 83036 HEMOGLOBIN GLYCOSYLATED A1C: CPT

## 2022-09-21 ENCOUNTER — OFFICE VISIT (OUTPATIENT)
Dept: INTERNAL MEDICINE CLINIC | Facility: CLINIC | Age: 72
End: 2022-09-21
Payer: COMMERCIAL

## 2022-09-21 VITALS
DIASTOLIC BLOOD PRESSURE: 72 MMHG | HEART RATE: 59 BPM | BODY MASS INDEX: 30.53 KG/M2 | RESPIRATION RATE: 16 BRPM | OXYGEN SATURATION: 99 % | SYSTOLIC BLOOD PRESSURE: 150 MMHG | HEIGHT: 72 IN | WEIGHT: 225.4 LBS

## 2022-09-21 DIAGNOSIS — R07.2 PRECORDIAL PAIN: Primary | ICD-10-CM

## 2022-09-21 DIAGNOSIS — R60.9 EDEMA, UNSPECIFIED TYPE: ICD-10-CM

## 2022-09-21 DIAGNOSIS — R06.09 DYSPNEA ON EXERTION: ICD-10-CM

## 2022-09-21 DIAGNOSIS — R94.31 ABNORMAL ECG: ICD-10-CM

## 2022-09-21 PROCEDURE — 3725F SCREEN DEPRESSION PERFORMED: CPT | Performed by: PHYSICIAN ASSISTANT

## 2022-09-21 PROCEDURE — 99214 OFFICE O/P EST MOD 30 MIN: CPT | Performed by: PHYSICIAN ASSISTANT

## 2022-09-21 RX ORDER — DIPHENOXYLATE HYDROCHLORIDE AND ATROPINE SULFATE 2.5; .025 MG/1; MG/1
1 TABLET ORAL DAILY
COMMUNITY

## 2022-09-21 NOTE — PROGRESS NOTES
Assessment/Plan:   Patient Instructions   With new onset symptoms of chest burning on exertion, relieved by rest, along with shortness of breath on exertion, increased swelling of legs, will have cardiac testing of nuclear stress testing and echocardiogram done  Testing site contacted, they will be in touch with patient  Quality Measures:       Return for Next scheduled follow up  Diagnoses and all orders for this visit:    Precordial pain  -     NM myocardial perfusion spect (stress and/or rest); Future  -     Echo complete w/ contrast if indicated; Future    Edema, unspecified type  -     NM myocardial perfusion spect (stress and/or rest); Future  -     Echo complete w/ contrast if indicated; Future    Dyspnea on exertion  -     NM myocardial perfusion spect (stress and/or rest); Future  -     Echo complete w/ contrast if indicated; Future    Abnormal ECG  -     NM myocardial perfusion spect (stress and/or rest); Future  -     Echo complete w/ contrast if indicated; Future    Other orders  -     multivitamin (THERAGRAN) TABS; Take 1 tablet by mouth daily        Subjective:      Patient ID: Chalo Mahmood is a 81 y o  male  Acute visit     Patient since he has been retired has noticed increased edema of his lower legs  He has been told in the past that some of it is due to varicose veins  He notes that when he goes to bed at night and gets up in the morning that some of the edema is improved but not all  Previously most of the edema was improved  He now has trouble wearing shoes because as the day goes on the edema worsens  He is on low-dose hydrochlorothiazide of 12 5 mg daily  He has not really noted any improvement with this medication  Of concern is that the patient states that on the weekend he was walking any noted the onset of burning substernal chest pain  He stopped walking sat down within a few minutes the pain improved  He admits that this has occurred previously on exertion  He is also noting increased shortness of breath on exertion which he did not have before  He has family history of coronary disease  Patient also with history of hyperlipidemia  Patient has history of hypertension  Denies symptoms of PND or orthopnea        ALLERGIES:  Allergies   Allergen Reactions    Amlodipine Swelling    Lisinopril      Other reaction(s): Cough  Other reaction(s): Cough       CURRENT MEDICATIONS:    Current Outpatient Medications:     aspirin 81 MG tablet, Take 81 mg by mouth daily, Disp: , Rfl:     atenolol (TENORMIN) 50 mg tablet, TAKE 1 TABLET DAILY, Disp: 90 tablet, Rfl: 3    esomeprazole (NexIUM) 20 mg capsule, Take 20 mg by mouth every morning before breakfast, Disp: , Rfl:     FLUoxetine (PROzac) 20 mg capsule, TAKE 1 CAPSULE BY MOUTH EVERY DAY, Disp: 90 capsule, Rfl: 3    meloxicam (MOBIC) 15 mg tablet, meloxicam 15 mg tablet  TAKE 1 TABLET BY MOUTH EVERY DAY, Disp: , Rfl:     multivitamin (THERAGRAN) TABS, Take 1 tablet by mouth daily, Disp: , Rfl:     olmesartan-hydrochlorothiazide (BENICAR HCT) 20-12 5 MG per tablet, TAKE 1 TABLET DAILY, Disp: 90 tablet, Rfl: 3    simvastatin (ZOCOR) 20 mg tablet, TAKE 1 TABLET DAILY, Disp: 90 tablet, Rfl: 3    tamsulosin (FLOMAX) 0 4 mg, Take 1 capsule (0 4 mg total) by mouth in the morning, Disp: 30 capsule, Rfl: 5    Cholecalciferol (VITAMIN D3) 2000 UNITS capsule, Take by mouth (Patient not taking: Reported on 9/21/2022), Disp: , Rfl:     ACTIVE PROBLEM LIST:  Patient Active Problem List   Diagnosis    Complete tear of left rotator cuff    Biceps tendinitis    Benign hypertension    Allergic rhinitis    Hyperlipidemia    Depression    Impaired fasting glucose    Vitamin D deficiency    Bilateral primary osteoarthritis of knee    Primary osteoarthritis of right knee    Primary osteoarthritis of left knee    Chronic venous insufficiency       PAST MEDICAL HISTORY:  Past Medical History:   Diagnosis Date    Arthritis 2012  Depression     Hyperlipidemia     Hypertension        PAST SURGICAL HISTORY:  Past Surgical History:   Procedure Laterality Date    ABDOMINAL SURGERY      KNEE ARTHROSCOPY      NASAL SINUS SURGERY      RI ARTHROSCOPY SHOULDER SURGICAL BICEPS TENODESIS Right 2017    Procedure: ARTHROSCOPIC BICEPS TENODESIS;  Surgeon: Leann Salas MD;  Location: [facility];  Service: Orthopedics    RI SHLDR ARTHROSCOP,SURG,W/ROTAT CUFF REPR Right 2017    Procedure: SHOULDER ARTHROSCOPY ROTATOR CUFF REPAIR ;  Surgeon: Leann Salas MD;  Location: [facility];  Service: Orthopedics    RI SURGICAL ARTHROSCOPY Ileneashish Kennedy DBRDMT 3+ Right 2017    Procedure: ARTHROSCOPY SHOULDER;  Surgeon: Leann Salas MD;  Location: [facility];  Service: Orthopedics    TONSILLECTOMY         FAMILY HISTORY:  Family History   Problem Relation Age of Onset    Cancer Father         Brain tumor    Heart disease Father     Hyperlipidemia Father     Hypertension Father     Brain cancer Father     Migraines Mother     Cancer Brother         Melanoma       SOCIAL HISTORY:  Social History     Socioeconomic History    Marital status: /Civil Union     Spouse name: Not on file    Number of children: Not on file    Years of education: Not on file    Highest education level: Not on file   Occupational History    Occupation: Part time   Tobacco Use    Smoking status: Former Smoker     Packs/day: 0 25     Years: 30 00     Pack years: 7 50     Types: Cigarettes     Start date:      Quit date:      Years since quittin 7    Smokeless tobacco: Never Used   Vaping Use    Vaping Use: Never used   Substance and Sexual Activity    Alcohol use: Not Currently     Alcohol/week: 0 0 standard drinks     Comment: social    Drug use: No    Sexual activity: Yes     Partners: Female     Birth control/protection: Condom Male     Comment: Denied high risk sexual behavior   Other Topics Concern    Not on file   Social History Narrative    Not on file     Social Determinants of Health     Financial Resource Strain: Not on file   Food Insecurity: Not on file   Transportation Needs: Not on file   Physical Activity: Not on file   Stress: Not on file   Social Connections: Not on file   Intimate Partner Violence: Not on file   Housing Stability: Not on file       Review of Systems   Constitutional: Negative for activity change, chills, fatigue and fever  HENT: Negative for congestion  Eyes: Negative for discharge  Respiratory: Positive for shortness of breath  Negative for cough and chest tightness  Cardiovascular: Positive for chest pain and leg swelling  Negative for palpitations  Gastrointestinal: Negative for abdominal pain  Genitourinary: Negative for difficulty urinating  Musculoskeletal: Negative for arthralgias and myalgias  Skin: Negative for rash  Allergic/Immunologic: Negative for immunocompromised state  Neurological: Negative for dizziness, syncope, weakness, light-headedness and headaches  Hematological: Negative for adenopathy  Does not bruise/bleed easily  Psychiatric/Behavioral: Negative for dysphoric mood  The patient is not nervous/anxious  Objective:  Vitals:    09/21/22 0943   BP: 150/72   BP Location: Left arm   Patient Position: Sitting   Cuff Size: Adult   Pulse: 59   Resp: 16   SpO2: 99%   Weight: 102 kg (225 lb 6 4 oz)   Height: 6' (1 829 m)     Body mass index is 30 57 kg/m²  Physical Exam  Vitals and nursing note reviewed  Constitutional:       General: He is not in acute distress  Appearance: He is well-developed  HENT:      Head: Normocephalic and atraumatic  Eyes:      Extraocular Movements: Extraocular movements intact  Pupils: Pupils are equal, round, and reactive to light  Neck:      Thyroid: No thyromegaly  Vascular: No carotid bruit or JVD  Cardiovascular:      Rate and Rhythm: Normal rate and regular rhythm        Heart sounds: Normal heart sounds  Pulmonary:      Effort: Pulmonary effort is normal  No respiratory distress  Breath sounds: Normal breath sounds  Comments: Slight decrease of breath sounds in lower bases  No definite crackles  Musculoskeletal:      Cervical back: Neck supple  Right lower leg: Edema (  2+ pitting to the mid pretibial region) present  Left lower leg: Edema ( 2+To the mid pretibial region) present  Lymphadenopathy:      Cervical: No cervical adenopathy  Skin:     General: Skin is warm and dry  Findings: No rash  Neurological:      General: No focal deficit present  Mental Status: He is alert and oriented to person, place, and time  Mental status is at baseline     Psychiatric:         Mood and Affect: Mood normal          Behavior: Behavior normal            RESULTS:    Recent Results (from the past 1008 hour(s))   ECG 12 lead    Collection Time: 08/25/22 12:16 PM   Result Value Ref Range    Ventricular Rate 56 BPM    Atrial Rate 56 BPM    WY Interval 216 ms    QRSD Interval 86 ms    QT Interval 388 ms    QTC Interval 374 ms    P [place] -13 degrees    QRS Axis 3 degrees    T Wave Axis 26 degrees   Protime-INR    Collection Time: 09/07/22 12:04 PM   Result Value Ref Range    Protime 12 4 11 6 - 14 5 seconds    INR 0 94 0 84 - 1 19   APTT    Collection Time: 09/07/22 12:04 PM   Result Value Ref Range    PTT 27 23 - 37 seconds   CBC and differential    Collection Time: 09/07/22 12:04 PM   Result Value Ref Range    WBC 7 45 4 31 - 10 16 Thousand/uL    RBC 5 14 3 88 - 5 62 Million/uL    Hemoglobin 15 4 12 0 - 17 0 g/dL    Hematocrit 45 2 36 5 - 49 3 %    MCV 88 82 - 98 fL    MCH 30 0 26 8 - 34 3 pg    MCHC 34 1 31 4 - 37 4 g/dL    RDW 15 3 (H) 11 6 - 15 1 %    MPV 10 2 8 9 - 12 7 fL    Platelets 159 167 - 612 Thousands/uL    nRBC 0 /100 WBCs    Neutrophils Relative 63 43 - 75 %    Immat GRANS % 0 0 - 2 %    Lymphocytes Relative 23 14 - 44 %    Monocytes Relative 10 4 - 12 % Eosinophils Relative 3 0 - 6 %    Basophils Relative 1 0 - 1 %    Neutrophils Absolute 4 67 1 85 - 7 62 Thousands/µL    Immature Grans Absolute 0 02 0 00 - 0 20 Thousand/uL    Lymphocytes Absolute 1 73 0 60 - 4 47 Thousands/µL    Monocytes Absolute 0 72 0 17 - 1 22 Thousand/µL    Eosinophils Absolute 0 22 0 00 - 0 61 Thousand/µL    Basophils Absolute 0 09 0 00 - 0 10 Thousands/µL   Comprehensive metabolic panel    Collection Time: 09/20/22  7:18 AM   Result Value Ref Range    Sodium 139 135 - 147 mmol/L    Potassium 4 4 3 5 - 5 3 mmol/L    Chloride 104 96 - 108 mmol/L    CO2 30 21 - 32 mmol/L    ANION GAP 5 4 - 13 mmol/L    BUN 25 5 - 25 mg/dL    Creatinine 1 23 0 60 - 1 30 mg/dL    Glucose, Fasting 112 (H) 65 - 99 mg/dL    Calcium 9 1 8 3 - 10 1 mg/dL    AST 19 5 - 45 U/L    ALT 36 12 - 78 U/L    Alkaline Phosphatase 49 46 - 116 U/L    Total Protein 6 6 6 4 - 8 4 g/dL    Albumin 3 5 3 5 - 5 0 g/dL    Total Bilirubin 0 94 0 20 - 1 00 mg/dL    eGFR 59 ml/min/1 73sq m   CBC and differential    Collection Time: 09/20/22  7:18 AM   Result Value Ref Range    WBC 7 87 4 31 - 10 16 Thousand/uL    RBC 4 88 3 88 - 5 62 Million/uL    Hemoglobin 15 3 12 0 - 17 0 g/dL    Hematocrit 43 5 36 5 - 49 3 %    MCV 89 82 - 98 fL    MCH 31 4 26 8 - 34 3 pg    MCHC 35 2 31 4 - 37 4 g/dL    RDW 15 9 (H) 11 6 - 15 1 %    MPV 10 8 8 9 - 12 7 fL    Platelets 927 672 - 126 Thousands/uL    nRBC 0 /100 WBCs    Neutrophils Relative 55 43 - 75 %    Immat GRANS % 0 0 - 2 %    Lymphocytes Relative 30 14 - 44 %    Monocytes Relative 11 4 - 12 %    Eosinophils Relative 3 0 - 6 %    Basophils Relative 1 0 - 1 %    Neutrophils Absolute 4 28 1 85 - 7 62 Thousands/µL    Immature Grans Absolute 0 02 0 00 - 0 20 Thousand/uL    Lymphocytes Absolute 2 37 0 60 - 4 47 Thousands/µL    Monocytes Absolute 0 83 0 17 - 1 22 Thousand/µL    Eosinophils Absolute 0 27 0 00 - 0 61 Thousand/µL    Basophils Absolute 0 10 0 00 - 0 10 Thousands/µL   Hemoglobin A1C Collection Time: 09/20/22  7:18 AM   Result Value Ref Range    Hemoglobin A1C 6 2 (H) Normal 3 8-5 6%; PreDiabetic 5 7-6 4%; Diabetic >=6 5%; Glycemic control for adults with diabetes <7 0% %     mg/dl   Lipid panel    Collection Time: 09/20/22  7:18 AM   Result Value Ref Range    Cholesterol 180 See Comment mg/dL    Triglycerides 192 (H) See Comment mg/dL    HDL, Direct 42 >=40 mg/dL    LDL Calculated 100 0 - 100 mg/dL    Non-HDL-Chol (CHOL-HDL) 138 mg/dl       This note was created with voice recognition software  Phonic, grammatical and spelling errors may be present within the note as a result

## 2022-09-21 NOTE — PATIENT INSTRUCTIONS
With new onset symptoms of chest burning on exertion, relieved by rest, along with shortness of breath on exertion, increased swelling of legs, will have cardiac testing of nuclear stress testing and echocardiogram done  Testing site contacted, they will be in touch with patient

## 2022-09-22 ENCOUNTER — APPOINTMENT (OUTPATIENT)
Dept: RADIOLOGY | Facility: HOSPITAL | Age: 72
End: 2022-09-22
Payer: COMMERCIAL

## 2022-09-22 ENCOUNTER — HOSPITAL ENCOUNTER (OUTPATIENT)
Dept: NON INVASIVE DIAGNOSTICS | Facility: CLINIC | Age: 72
Discharge: HOME | End: 2022-09-22
Payer: COMMERCIAL

## 2022-09-22 ENCOUNTER — HOSPITAL ENCOUNTER (OUTPATIENT)
Facility: HOSPITAL | Age: 72
Setting detail: OBSERVATION
Discharge: HOME | End: 2022-09-23
Attending: EMERGENCY MEDICINE | Admitting: STUDENT IN AN ORGANIZED HEALTH CARE EDUCATION/TRAINING PROGRAM
Payer: COMMERCIAL

## 2022-09-22 VITALS
SYSTOLIC BLOOD PRESSURE: 104 MMHG | DIASTOLIC BLOOD PRESSURE: 72 MMHG | BODY MASS INDEX: 30.48 KG/M2 | HEART RATE: 50 BPM | OXYGEN SATURATION: 97 % | HEIGHT: 72 IN | WEIGHT: 225 LBS

## 2022-09-22 VITALS
SYSTOLIC BLOOD PRESSURE: 104 MMHG | BODY MASS INDEX: 30.48 KG/M2 | WEIGHT: 225 LBS | HEIGHT: 72 IN | HEART RATE: 55 BPM | DIASTOLIC BLOOD PRESSURE: 72 MMHG

## 2022-09-22 DIAGNOSIS — R07.2 PRECORDIAL PAIN: ICD-10-CM

## 2022-09-22 DIAGNOSIS — R07.9 EXERTIONAL CHEST PAIN: ICD-10-CM

## 2022-09-22 DIAGNOSIS — R60.9 EDEMA, UNSPECIFIED TYPE: ICD-10-CM

## 2022-09-22 DIAGNOSIS — R06.09 DYSPNEA ON EXERTION: Primary | ICD-10-CM

## 2022-09-22 DIAGNOSIS — R06.09 DYSPNEA ON EXERTION: ICD-10-CM

## 2022-09-22 DIAGNOSIS — R94.39 ABNORMAL CARDIOVASCULAR STRESS TEST: ICD-10-CM

## 2022-09-22 DIAGNOSIS — I20.8 STABLE ANGINA (HCC): ICD-10-CM

## 2022-09-22 DIAGNOSIS — R94.31 ABNORMAL ECG: ICD-10-CM

## 2022-09-22 PROBLEM — I20.89 STABLE ANGINA: Status: ACTIVE | Noted: 2022-09-22

## 2022-09-22 LAB
2HR DELTA HS TROPONIN: 4 NG/L
4HR DELTA HS TROPONIN: -6 NG/L
ANION GAP SERPL CALCULATED.3IONS-SCNC: 10 MMOL/L (ref 4–13)
AORTIC ROOT: 3.7 CM
APICAL FOUR CHAMBER EJECTION FRACTION: 57 %
APTT PPP: 28 SECONDS (ref 23–37)
ASCENDING AORTA: 3.1 CM
AV LVOT PEAK GRADIENT: 2 MMHG
AV PEAK GRADIENT: 4 MMHG
BASOPHILS # BLD AUTO: 0.11 THOUSANDS/ΜL (ref 0–0.1)
BASOPHILS NFR BLD AUTO: 1 % (ref 0–1)
BUN SERPL-MCNC: 22 MG/DL (ref 5–25)
CALCIUM SERPL-MCNC: 9.2 MG/DL (ref 8.3–10.1)
CARDIAC TROPONIN I PNL SERPL HS: 12 NG/L
CARDIAC TROPONIN I PNL SERPL HS: 18 NG/L
CARDIAC TROPONIN I PNL SERPL HS: 22 NG/L
CHLORIDE SERPL-SCNC: 104 MMOL/L (ref 96–108)
CO2 SERPL-SCNC: 26 MMOL/L (ref 21–32)
CREAT SERPL-MCNC: 0.99 MG/DL (ref 0.6–1.3)
E WAVE DECELERATION TIME: 191 MS
EOSINOPHIL # BLD AUTO: 0.39 THOUSAND/ΜL (ref 0–0.61)
EOSINOPHIL NFR BLD AUTO: 4 % (ref 0–6)
ERYTHROCYTE [DISTWIDTH] IN BLOOD BY AUTOMATED COUNT: 16 % (ref 11.6–15.1)
FRACTIONAL SHORTENING: 37 % (ref 28–44)
GFR SERPL CREATININE-BSD FRML MDRD: 76 ML/MIN/1.73SQ M
GLUCOSE SERPL-MCNC: 111 MG/DL (ref 65–140)
HCT VFR BLD AUTO: 45.2 % (ref 36.5–49.3)
HGB BLD-MCNC: 15.4 G/DL (ref 12–17)
IMM GRANULOCYTES # BLD AUTO: 0.03 THOUSAND/UL (ref 0–0.2)
IMM GRANULOCYTES NFR BLD AUTO: 0 % (ref 0–2)
INR PPP: 1 (ref 0.84–1.19)
INTERVENTRICULAR SEPTUM IN DIASTOLE (PARASTERNAL SHORT AXIS VIEW): 1.1 CM
INTERVENTRICULAR SEPTUM: 1.1 CM (ref 0.6–1.1)
LAAS-AP2: 21 CM2
LAAS-AP4: 17.4 CM2
LEFT ATRIUM AREA SYSTOLE SINGLE PLANE A4C: 15.2 CM2
LEFT ATRIUM SIZE: 4.4 CM
LEFT INTERNAL DIMENSION IN SYSTOLE: 3.1 CM (ref 2.1–4)
LEFT VENTRICULAR INTERNAL DIMENSION IN DIASTOLE: 4.9 CM (ref 3.5–6)
LEFT VENTRICULAR POSTERIOR WALL IN END DIASTOLE: 1.1 CM
LEFT VENTRICULAR STROKE VOLUME: 76 ML
LVSV (TEICH): 76 ML
LYMPHOCYTES # BLD AUTO: 2.35 THOUSANDS/ΜL (ref 0.6–4.47)
LYMPHOCYTES NFR BLD AUTO: 26 % (ref 14–44)
MAX HR PERCENT: 81 %
MAX HR: 121 BPM
MCH RBC QN AUTO: 30.4 PG (ref 26.8–34.3)
MCHC RBC AUTO-ENTMCNC: 34.1 G/DL (ref 31.4–37.4)
MCV RBC AUTO: 89 FL (ref 82–98)
MONOCYTES # BLD AUTO: 0.83 THOUSAND/ΜL (ref 0.17–1.22)
MONOCYTES NFR BLD AUTO: 9 % (ref 4–12)
MV E'TISSUE VEL-SEP: 9 CM/S
MV PEAK A VEL: 0.78 M/S
MV PEAK E VEL: 66 CM/S
MV STENOSIS PRESSURE HALF TIME: 55 MS
MV VALVE AREA P 1/2 METHOD: 4 CM2
NEUTROPHILS # BLD AUTO: 5.37 THOUSANDS/ΜL (ref 1.85–7.62)
NEUTS SEG NFR BLD AUTO: 60 % (ref 43–75)
NRBC BLD AUTO-RTO: 0 /100 WBCS
NT-PROBNP SERPL-MCNC: 54 PG/ML
NUC STRESS DIASTOLIC VOLUME INDEX: 91 ML/M2
NUC STRESS EJECTION FRACTION: 68 %
NUC STRESS SYSTOLIC VOLUME INDEX: 29 ML/M2
PLATELET # BLD AUTO: 366 THOUSANDS/UL (ref 149–390)
PMV BLD AUTO: 10.4 FL (ref 8.9–12.7)
POTASSIUM SERPL-SCNC: 4.9 MMOL/L (ref 3.5–5.3)
PROTHROMBIN TIME: 13 SECONDS (ref 11.6–14.5)
RATE PRESSURE PRODUCT: NORMAL
RBC # BLD AUTO: 5.07 MILLION/UL (ref 3.88–5.62)
RIGHT ATRIAL 2D VOLUME: 61 ML
RIGHT ATRIUM AREA SYSTOLE A4C: 20.2 CM2
RIGHT VENTRICLE ID DIMENSION: 4.1 CM
SL CV LEFT ATRIUM LENGTH A2C: 5.1 CM
SL CV PED ECHO LEFT VENTRICLE DIASTOLIC VOLUME (MOD BIPLANE) 2D: 112 ML
SL CV PED ECHO LEFT VENTRICLE SYSTOLIC VOLUME (MOD BIPLANE) 2D: 37 ML
SL CV REST NUCLEAR ISOTOPE DOSE: 10.73 MCI
SL CV STRESS NUCLEAR ISOTOPE DOSE: 31.1 MCI
SL CV STRESS RECOVERY BP: NORMAL MMHG
SL CV STRESS RECOVERY HR: 61 BPM
SL CV STRESS RECOVERY O2 SAT: 98 %
SL CV STRESS STAGE REACHED: 2
SODIUM SERPL-SCNC: 140 MMOL/L (ref 135–147)
STRESS ANGINA INDEX: 1
STRESS BASELINE BP: NORMAL MMHG
STRESS BASELINE HR: 50 BPM
STRESS O2 SAT REST: 97 %
STRESS PEAK HR: 121 BPM
STRESS POST ESTIMATED WORKLOAD: 7 METS
STRESS POST EXERCISE DUR MIN: 6 MIN
STRESS POST O2 SAT PEAK: 97 %
STRESS POST PEAK BP: 184 MMHG
STRESS/REST PERFUSION RATIO: 1.07
TR MAX PG: 39 MMHG
TR PEAK VELOCITY: 3.1 M/S
TRICUSPID VALVE PEAK REGURGITATION VELOCITY: 3.11 M/S
WBC # BLD AUTO: 9.08 THOUSAND/UL (ref 4.31–10.16)

## 2022-09-22 PROCEDURE — G1004 CDSM NDSC: HCPCS

## 2022-09-22 PROCEDURE — 93017 CV STRESS TEST TRACING ONLY: CPT

## 2022-09-22 PROCEDURE — 80048 BASIC METABOLIC PNL TOTAL CA: CPT | Performed by: EMERGENCY MEDICINE

## 2022-09-22 PROCEDURE — 93005 ELECTROCARDIOGRAM TRACING: CPT

## 2022-09-22 PROCEDURE — 71046 X-RAY EXAM CHEST 2 VIEWS: CPT

## 2022-09-22 PROCEDURE — 78452 HT MUSCLE IMAGE SPECT MULT: CPT

## 2022-09-22 PROCEDURE — 93306 TTE W/DOPPLER COMPLETE: CPT

## 2022-09-22 PROCEDURE — 84484 ASSAY OF TROPONIN QUANT: CPT | Performed by: EMERGENCY MEDICINE

## 2022-09-22 PROCEDURE — A9502 TC99M TETROFOSMIN: HCPCS

## 2022-09-22 PROCEDURE — 99220 PR INITIAL OBSERVATION CARE/DAY 70 MINUTES: CPT | Performed by: STUDENT IN AN ORGANIZED HEALTH CARE EDUCATION/TRAINING PROGRAM

## 2022-09-22 PROCEDURE — 99284 EMERGENCY DEPT VISIT MOD MDM: CPT

## 2022-09-22 PROCEDURE — 83880 ASSAY OF NATRIURETIC PEPTIDE: CPT | Performed by: EMERGENCY MEDICINE

## 2022-09-22 PROCEDURE — 78452 HT MUSCLE IMAGE SPECT MULT: CPT | Performed by: INTERNAL MEDICINE

## 2022-09-22 PROCEDURE — 85610 PROTHROMBIN TIME: CPT | Performed by: EMERGENCY MEDICINE

## 2022-09-22 PROCEDURE — 85025 COMPLETE CBC W/AUTO DIFF WBC: CPT | Performed by: EMERGENCY MEDICINE

## 2022-09-22 PROCEDURE — 93018 CV STRESS TEST I&R ONLY: CPT | Performed by: INTERNAL MEDICINE

## 2022-09-22 PROCEDURE — 85730 THROMBOPLASTIN TIME PARTIAL: CPT | Performed by: EMERGENCY MEDICINE

## 2022-09-22 PROCEDURE — 99285 EMERGENCY DEPT VISIT HI MDM: CPT | Performed by: EMERGENCY MEDICINE

## 2022-09-22 PROCEDURE — 93306 TTE W/DOPPLER COMPLETE: CPT | Performed by: INTERNAL MEDICINE

## 2022-09-22 PROCEDURE — 93016 CV STRESS TEST SUPVJ ONLY: CPT | Performed by: INTERNAL MEDICINE

## 2022-09-22 PROCEDURE — 36415 COLL VENOUS BLD VENIPUNCTURE: CPT | Performed by: EMERGENCY MEDICINE

## 2022-09-22 RX ORDER — FLUOXETINE HYDROCHLORIDE 20 MG/1
20 CAPSULE ORAL DAILY
Status: DISCONTINUED | OUTPATIENT
Start: 2022-09-23 | End: 2022-09-23 | Stop reason: HOSPADM

## 2022-09-22 RX ORDER — HYDROCHLOROTHIAZIDE 12.5 MG/1
12.5 TABLET ORAL DAILY
Status: DISCONTINUED | OUTPATIENT
Start: 2022-09-23 | End: 2022-09-23

## 2022-09-22 RX ORDER — PRAVASTATIN SODIUM 40 MG
40 TABLET ORAL
Status: DISCONTINUED | OUTPATIENT
Start: 2022-09-22 | End: 2022-09-23 | Stop reason: HOSPADM

## 2022-09-22 RX ORDER — PANTOPRAZOLE SODIUM 20 MG/1
20 TABLET, DELAYED RELEASE ORAL
Status: DISCONTINUED | OUTPATIENT
Start: 2022-09-23 | End: 2022-09-23 | Stop reason: HOSPADM

## 2022-09-22 RX ORDER — ASPIRIN 81 MG/1
81 TABLET, CHEWABLE ORAL DAILY
Status: DISCONTINUED | OUTPATIENT
Start: 2022-09-23 | End: 2022-09-23 | Stop reason: HOSPADM

## 2022-09-22 RX ORDER — ENOXAPARIN SODIUM 100 MG/ML
40 INJECTION SUBCUTANEOUS DAILY
Status: DISCONTINUED | OUTPATIENT
Start: 2022-09-23 | End: 2022-09-23

## 2022-09-22 RX ORDER — ATENOLOL 50 MG/1
50 TABLET ORAL DAILY
Status: DISCONTINUED | OUTPATIENT
Start: 2022-09-23 | End: 2022-09-23 | Stop reason: HOSPADM

## 2022-09-22 RX ORDER — TAMSULOSIN HYDROCHLORIDE 0.4 MG/1
0.4 CAPSULE ORAL DAILY
Status: DISCONTINUED | OUTPATIENT
Start: 2022-09-22 | End: 2022-09-23 | Stop reason: HOSPADM

## 2022-09-22 RX ORDER — LOSARTAN POTASSIUM 25 MG/1
25 TABLET ORAL DAILY
Status: DISCONTINUED | OUTPATIENT
Start: 2022-09-23 | End: 2022-09-23 | Stop reason: HOSPADM

## 2022-09-22 RX ADMIN — PRAVASTATIN SODIUM 40 MG: 40 TABLET ORAL at 19:07

## 2022-09-22 RX ADMIN — TAMSULOSIN HYDROCHLORIDE 0.4 MG: 0.4 CAPSULE ORAL at 19:07

## 2022-09-22 NOTE — ASSESSMENT & PLAN NOTE
81year old male patient past medical history of hyperlipidemia, hypertension, patient has been having chest pain on exertion, relieved by rest, associated with bilateral extremity swelling  Patient had outpatient abnormal nuclear stress test today and was referred to emergency room for admission for possible cardiac catheterization tomorrow  Patient was chest pain-free after stress test as per report  Today's labs reviewed and noted grossly unremarkable for acute finding pain  Troponin negative x1  EKG noted with sinus bradycardia with first-degree block  Echo report noted with normal EF 55%, aortic valve is trileaflet  Currently patient is chest pain-free  Hemodynamically stable  Will continue with aspirin, statin  Keep NPO after midnight tonight empirically for possible cardiac catheterization tomorrow  Continue telemetry monitor  Follow-up with Cardiology consult  Patient is agreeable with above plan

## 2022-09-22 NOTE — ASSESSMENT & PLAN NOTE
Present on admission history of hypertension  Blood pressure control  Resume home dose of atenolol, Benicar equivalent

## 2022-09-22 NOTE — Clinical Note
The ECG shows a sinus bradycardia   ECG rate  = 53 bpm  Patient seen and examined care d/w HS4.    58 yo M with stage 4 colon cancer (mets to liver and lungs, received palliative folfox 2/16/21) presenting with abdominal pain, N/V, and distension. Admitted for medical management of LBO due to obstruction 2/2 transverse colon mass.     #Kleb bacteremia: had sepsis (febrile and tachycardic) on 3/13 presumed GI source. UA negative. BCx growing klebsiella. GI PCR negative. Blood Cx on 3/15 no growth to date.  zosyn --> ctx 1g q24h, D 5/10  #Large bowel obstruction, s/p colonic stent by GI, tolerating diet, having stool output, diarrhea however improving, surgery rec surgery next week f/u re: timing and dc home prior vs stay here  #Metastatic colon CA: no plans for inpatient chemo, c/w PO pain control   #Anemia of chronic disease, monitor hgb   #Hypokalemia: now improved s/p repletion    Pre-op, good METS (was working up till January) w/ no cardiac complaints, repeat EKG, RCRI score of 1, patient is medically optimized and may proceed w/ OR as planned

## 2022-09-22 NOTE — ED PROVIDER NOTES
History  Chief Complaint   Patient presents with    Evaluation of Abnormal Diagnostic Test     Sent by cardiology after echo and stress; pt c/o edema in lower ext and dyspnea with exertion      HPI    Prior to Admission Medications   Prescriptions Last Dose Informant Patient Reported? Taking?    Cholecalciferol (VITAMIN D3) 2000 UNITS capsule  Self Yes No   Sig: Take by mouth   Patient not taking: Reported on 9/21/2022   FLUoxetine (PROzac) 20 mg capsule  Self No No   Sig: TAKE 1 CAPSULE BY MOUTH EVERY DAY   aspirin 81 MG tablet  Self Yes No   Sig: Take 81 mg by mouth daily   atenolol (TENORMIN) 50 mg tablet  Self No No   Sig: TAKE 1 TABLET DAILY   esomeprazole (NexIUM) 20 mg capsule  Self Yes No   Sig: Take 20 mg by mouth every morning before breakfast   meloxicam (MOBIC) 15 mg tablet   Yes No   Sig: meloxicam 15 mg tablet   TAKE 1 TABLET BY MOUTH EVERY DAY   multivitamin (THERAGRAN) TABS  Self Yes No   Sig: Take 1 tablet by mouth daily   olmesartan-hydrochlorothiazide (BENICAR HCT) 20-12 5 MG per tablet   No No   Sig: TAKE 1 TABLET DAILY   simvastatin (ZOCOR) 20 mg tablet  Self No No   Sig: TAKE 1 TABLET DAILY   tamsulosin (FLOMAX) 0 4 mg  Self No No   Sig: Take 1 capsule (0 4 mg total) by mouth in the morning      Facility-Administered Medications: None       Past Medical History:   Diagnosis Date    Arthritis 2012    Depression 2012    Hyperlipidemia     Hypertension        Past Surgical History:   Procedure Laterality Date    ABDOMINAL SURGERY      KNEE ARTHROSCOPY      NASAL SINUS SURGERY      [facility] ARTHROSCOPY SHOULDER SURGICAL BICEPS TENODESIS Right 1/25/2017    Procedure: ARTHROSCOPIC BICEPS TENODESIS;  Surgeon: Ceasar Ceron MD;  [facility];  Service: Orthopedics    [facility] SHLDR ARTHROSCOP,SURG,W/ROTAT CUFF REPR Right 1/25/2017    Procedure: SHOULDER ARTHROSCOPY ROTATOR CUFF REPAIR ;  Surgeon: Ceasar Ceron MD;  [facility];  Service: Orthopedics    HI SURGICAL ARTHROSCOPY Renato Rosado DBRDMT 3+ Right 2017    Procedure: ARTHROSCOPY SHOULDER;  Surgeon: Afia Florian MD;  [facility];  Service: Orthopedics    TONSILLECTOMY         Family History   Problem Relation Age of Onset    Cancer Father         Brain tumor    Heart disease Father     Hyperlipidemia Father     Hypertension Father     Brain cancer Father     Migraines Mother     Cancer Brother         Melanoma     I have reviewed and agree with the history as documented  E-Cigarette/Vaping    E-Cigarette Use Never User      E-Cigarette/Vaping Substances    Nicotine No     THC No     CBD No     Flavoring No     Other No     Unknown No      Social History     Tobacco Use    Smoking status: Former Smoker     Packs/day: 0 25     Years: 30 00     Pack years: 7 50     Types: Cigarettes     Start date:      Quit date:      Years since quittin 7    Smokeless tobacco: Never Used   Vaping Use    Vaping Use: Never used   Substance Use Topics    Alcohol use: Not Currently     Alcohol/week: 0 0 standard drinks     Comment: social    Drug use: No       Review of Systems    Physical Exam  Physical Exam  Vitals and nursing note reviewed  Constitutional:       General: He is not in acute distress  Appearance: He is well-developed  HENT:      Head: Normocephalic and atraumatic  Eyes:      Conjunctiva/sclera: Conjunctivae normal       Pupils: Pupils are equal, round, and reactive to light  Neck:      Trachea: No tracheal deviation  Cardiovascular:      Rate and Rhythm: Normal rate and regular rhythm  Pulses:           Radial pulses are 2+ on the right side  Heart sounds: Normal heart sounds  Pulmonary:      Effort: Pulmonary effort is normal  No respiratory distress  Breath sounds: Normal breath sounds  Abdominal:      General: There is no distension  Palpations: Abdomen is soft  Tenderness: There is no abdominal tenderness     Musculoskeletal: Cervical back: Normal range of motion  Right lower le+ Pitting Edema present  Left lower le+ Pitting Edema present  Skin:     General: Skin is warm and dry  Neurological:      Mental Status: He is alert and oriented to person, place, and time  GCS: GCS eye subscore is 4  GCS verbal subscore is 5  GCS motor subscore is 6     Psychiatric:         Behavior: Behavior normal          Vital Signs  ED Triage Vitals   Temperature Pulse Respirations Blood Pressure SpO2   22 1635 22 1635 22 1635 22 1635 22 1635   (!) 97 °F (36 1 °C) 80 18 149/70 99 %      Temp Source Heart Rate Source Patient Position - Orthostatic VS BP Location FiO2 (%)   22 16322 1730 22 18322 --   Oral Monitor Lying Right arm       Pain Score       --                  Vitals:    22 1635 22 1730 22 18322   BP: 149/70 127/72 109/55 (!) 139/104   Pulse: 80 63 55 64   Patient Position - Orthostatic VS:   Lying          Visual Acuity      ED Medications  Medications       Diagnostic Studies  Results Reviewed     Procedure Component Value Units Date/Time    HS Troponin I 2hr [[id number]]  (Normal) Collected: 22 190    Lab Status: Final result Specimen: Blood from Arm, Right Updated: 22     hs TnI 2hr 22 ng/L      Delta 2hr hsTnI 4 ng/L     HS Troponin I 4hr [[id number]]     Lab Status: No result Specimen: Blood     HS Troponin 0hr (reflex protocol) [[id number]]  (Normal) Collected: 22    Lab Status: Final result Specimen: Blood from Arm, Right Updated: 22 1753     hs TnI 0hr 18 ng/L     Basic metabolic panel [[id number]] Collected: 22    Lab Status: Final result Specimen: Blood from Arm, Right Updated: 22 174     Sodium 140 mmol/L      Potassium 4 9 mmol/L      Chloride 104 mmol/L      CO2 26 mmol/L      ANION GAP 10 mmol/L      BUN 22 mg/dL      Creatinine 0 99 mg/dL      Glucose 111 mg/dL Calcium 9 2 mg/dL      eGFR 76 ml/min/1 73sq m     Narrative:      National Kid[employer] guidelines for Chronic Kidney Disease (CKD):     Stage 1 with normal or high GFR (GFR > 90 mL/min/1 73 square meters)    Stage 2 Mild CKD (GFR = 60-89 mL/min/1 73 square meters)    Stage 3A Moderate CKD (GFR = 45-59 mL/min/1 73 square meters)    Stage 3B Moderate CKD (GFR = 30-44 mL/min/1 73 square meters)    Stage 4 Severe CKD (GFR = 15-29 mL/min/1 73 square meters)    Stage 5 End Stage CKD (GFR <15 mL/min/1 73 square meters)  Note: GFR calculation is accurate only with a steady state creatinine    NT-BNP PRO [[id number]]  (Normal) Collected: 09/22/22 1718    Lab Status: Final result Specimen: Blood from Arm, Right Updated: 09/22/22 1749     NT-proBNP 54 pg/mL     Protime-INR [[id number]]  (Normal) Collected: 09/22/22 1718    Lab Status: Final result Specimen: Blood from Arm, Right Updated: 09/22/22 1735     Protime 13 0 seconds      INR 1 00    APTT [[id number]]  (Normal) Collected: 09/22/22 1718    Lab Status: Final result Specimen: Blood from Arm, Right Updated: 09/22/22 1735     PTT 28 seconds     CBC and differential [[id number]]  (Abnormal) Collected: 09/22/22 1718    Lab Status: Final result Specimen: Blood from Arm, Right Updated: 09/22/22 1724     WBC 9 08 Thousand/uL      RBC 5 07 Million/uL      Hemoglobin 15 4 g/dL      Hematocrit 45 2 %      MCV 89 fL      MCH 30 4 pg      MCHC 34 1 g/dL      RDW 16 0 %      MPV 10 4 fL      Platelets 455 Thousands/uL      nRBC 0 /100 WBCs      Neutrophils Relative 60 %      Immat GRANS % 0 %      Lymphocytes Relative 26 %      Monocytes Relative 9 %      Eosinophils Relative 4 %      Basophils Relative 1 %      Neutrophils Absolute 5 37 Thousands/µL      Immature Grans Absolute 0 03 Thousand/uL      Lymphocytes Absolute 2 35 Thousands/µL      Monocytes Absolute 0 83 Thousand/µL      Eosinophils Absolute 0 39 Thousand/µL      Basophils Absolute 0 11 Thousands/µL XR chest 2 views    (Results Pending)              Procedures  ECG 12 Lead Documentation Only    Date/Time: 9/22/2022 4:57 PM  Performed by: Nila Ling MD  Authorized by: Nila Ling MD     Indications / Diagnosis:  STAUFFER  ECG reviewed by me, the ED Provider: yes    Patient location:  ED  Previous ECG:     Previous ECG:  Compared to current    Comparison ECG info:  08/25/22    Similarity:  No change  Interpretation:     Interpretation: normal    Rate:     ECG rate:  67    ECG rate assessment: normal    Rhythm:     Rhythm: sinus rhythm    Ectopy:     Ectopy: none    QRS:     QRS axis:  Normal    QRS intervals:  Normal  Conduction:     Conduction: normal    ST segments:     ST segments:  Normal  T waves:     T waves: normal               ED Course                                             MDM  Number of Diagnoses or Management Options  Abnormal cardiovascular stress test: new and requires workup  Dyspnea on exertion: new and requires workup  Exertional chest pain: new and requires workup  Diagnosis management comments: This is a 11year-old male who presents here today for evaluation of abnormal stress test   He says for several years he has had problems with lower extremity edema and has been on medications for this by his PCP  However, for several weeks he has had dyspnea on exertion with occasional central chest pain with significant exertion  He has how far he is able to walk and on how quickly he is trying to walk but says it is overall much less than normal   He says the edema went from just his ankles to now mildly to his feet and lower legs  He denies any other areas of edema, weight gain, fevers, URI symptoms, symptoms at rest, orthopnea or PND  He does not have chest pain at rest, denies palpitations, syncope or presyncope  He denies any prior problems with his heart or lungs    He says he is supposed to have shoulder surgery in early October so given new dyspnea on exertion and exertional chest pain followed up with his doctor yesterday in order to see a cardiologist prior to the surgery to make sure was safe to do  He said he had testing today and was told to come here because it was abnormal   He has a history of smoking, but he quit about 40 years ago  Echo showed EF of 39%, normal diastolic dysfunction and no wall motion abnormalities  Stress EKG showed "Horizontal ST depression (II, III, aVF, V3, V4, V5 and V6) is noted at only 81% of age predicted maximal heart rate  The stress ECG is consistent with ischemia after maximal exercise, with reproduction of symptoms (chest pain)  " and perfusion showed "There is a left ventricular perfusion defect that is medium in size with mild reduction in uptake present in the mid to apical anterior and the apical cap segments that is partially reversible " The patient he did develop chest pain during the procedure but it resolved by the time it was done  He denies any symptoms at this time  He does take a baby aspirin daily  Review of systems:  Otherwise negative unless stated above    He is well appearing, no acute distress  He has 1+ edema bilateral lower legs  Exam is otherwise unremarkable  I did discuss with Dr Tana Delgado from cardiology recommends admission to the hospital NPO after midnight for cardiac catheterization tomorrow  Chest x-ray was reviewed by myself, and shows no acute abnormalities  Lab work is unremarkable         Amount and/or Complexity of Data Reviewed  Clinical lab tests: reviewed and ordered  Tests in the radiology section of CPT®: ordered and reviewed  Independent visualization of images, tracings, or specimens: yes        Disposition  Final diagnoses:   Dyspnea on exertion   Exertional chest pain   Abnormal cardiovascular stress test     Time reflects when diagnosis was documented in both MDM as applicable and the Disposition within this note     Time User Action Codes Description Comment    9/22/2022  4:56 PM Hansel Barney Add [R06 00] Dyspnea on exertion     9/22/2022  4:56 PM Hansel Barney Add [R07 9] Exertional chest pain     9/22/2022  4:56 PM Hansel Barney Add [R94 39] Abnormal cardiovascular stress test     9/22/2022  6:56 PM Lito Goodson Add [I20 8] Stable angina [facility])       ED Disposition     ED Disposition   Admit    Condition   Stable    Date/Time   Thu Sep 22, 2022  4:56 PM    Comment   Case was discussed with Dr Carlos Marie and the patient's admission status was agreed to be Admission Status: observation status to the service of Dr Carlos Marie              Follow-up Information    None         Current Discharge Medication List      CONTINUE these medications which have NOT CHANGED    Details   aspirin 81 MG tablet Take 81 mg by mouth daily      atenolol (TENORMIN) 50 mg tablet TAKE 1 TABLET DAILY  Qty: 90 tablet, Refills: 3    Associated Diagnoses: Essential hypertension      Cholecalciferol (VITAMIN D3) 2000 UNITS capsule Take by mouth      esomeprazole (NexIUM) 20 mg capsule Take 20 mg by mouth every morning before breakfast      FLUoxetine (PROzac) 20 mg capsule TAKE 1 CAPSULE BY MOUTH EVERY DAY  Qty: 90 capsule, Refills: 3    Associated Diagnoses: Other depression      meloxicam (MOBIC) 15 mg tablet meloxicam 15 mg tablet   TAKE 1 TABLET BY MOUTH EVERY DAY      multivitamin (THERAGRAN) TABS Take 1 tablet by mouth daily      olmesartan-hydrochlorothiazide (BENICAR HCT) 20-12 5 MG per tablet TAKE 1 TABLET DAILY  Qty: 90 tablet, Refills: 3    Associated Diagnoses: Benign hypertension      simvastatin (ZOCOR) 20 mg tablet TAKE 1 TABLET DAILY  Qty: 90 tablet, Refills: 3    Associated Diagnoses: Hyperlipidemia, unspecified hyperlipidemia type      tamsulosin (FLOMAX) 0 4 mg Take 1 capsule (0 4 mg total) by mouth in the morning  Qty: 30 capsule, Refills: 5    Associated Diagnoses: Benign prostatic hyperplasia without lower urinary tract symptoms             No discharge procedures on file      PDMP Review     None          ED Provider  Electronically Signed by           Christophe Anderson MD  09/22/22 5501

## 2022-09-22 NOTE — H&P
[address] 1950, 81 y o  male MRN: [id number]  Unit/Bed#: ED 19 Encounter: [id number]  Primary Care Provider: Susannah Azevedo MD   Date and time admitted to hospital: 9/22/2022  4:38 PM    * Stable angina [facility])  Assessment & Plan   81year old male patient past medical history of hyperlipidemia, hypertension, patient has been having chest pain on exertion, relieved by rest, associated with bilateral extremity swelling  Patient had outpatient abnormal nuclear stress test today and was referred to emergency room for admission for possible cardiac catheterization tomorrow  Patient was chest pain-free after stress test as per report  Today's labs reviewed and noted grossly unremarkable for acute finding pain  Troponin negative x1  EKG noted with sinus bradycardia with first-degree block  Echo report noted with normal EF 55%, aortic valve is trileaflet  Currently patient is chest pain-free  Hemodynamically stable  Will continue with aspirin, statin  Keep NPO after midnight tonight empirically for possible cardiac catheterization tomorrow  Continue telemetry monitor  Follow-up with Cardiology consult  Patient is agreeable with above plan  Depression  Assessment & Plan  Present on admission history of depression  Denies suicidal, homicidal ideation  Resume home dose of Prozac  Outpatient follow-up  Hyperlipidemia  Assessment & Plan  Present on admission with history of hyperlipidemia  Resume home dose of simvastatin  Benign hypertension  Assessment & Plan  Present on admission history of hypertension  Blood pressure control  Resume home dose of atenolol, Benicar equivalent  VTE Pharmacologic Prophylaxis: VTE Score: 4 Moderate Risk (Score 3-4) - Pharmacological DVT Prophylaxis Ordered: enoxaparin (Lovenox)  Code Status: Level 1 - Full Code   Discussion with family: Updated  (daughter) at bedside      Anticipated Length of Stay: Patient will be admitted on an observation basis with an anticipated length of stay of less than 2 midnights secondary to abnormal stress test     Total Time for Visit, including Counseling / Coordination of Care: 60 minutes Greater than 50% of this total time spent on direct patient counseling and coordination of care  Chief Complaint: abnormal stress test  Pt has been having episodes of chest pain on exertion  Currently asymptomatic  History of Present Illness:    Sha Du is a 81 y o  male with a PMH of hyperlipidemia, depression, hypertension, arthritis, vitamin-D deficiency, venous insufficiency, patient has been having episodes of burning chest pain on exertion associated with bilateral lower extremity edema for quite some time and had an outpatient nuclear stress test today which was found abnormal and he was referred to emergency room for likely cardiac catheterization tomorrow  Currently my encounter patient appears comfortable not in distress  Currently denies chest pain, dyspnea, fever, chills, nausea, vomiting, diaphoresis, abdominal pain, dysuria, diarrhea, any new complaints  Former smoker  Reports drinking alcohol rarely  Denies any substance use  No other events reported  Review of Systems:  Review of Systems   Constitutional: Negative for chills, diaphoresis, fatigue and fever  HENT: Negative for congestion  Eyes: Negative for visual disturbance  Respiratory: Negative for shortness of breath  Cardiovascular: Negative for chest pain and palpitations  Gastrointestinal: Negative for abdominal pain, nausea and vomiting  Endocrine: Negative for polyuria  Genitourinary: Negative for dysuria  Musculoskeletal: Negative for neck stiffness  Neurological: Negative for weakness  Psychiatric/Behavioral: Negative for agitation         Past Medical and Surgical History:   Past Medical History:   Diagnosis Date    Arthritis 2012    Depression 2012    Hyperlipidemia  Hypertension        Past Surgical History:   Procedure Laterality Date    ABDOMINAL SURGERY      KNEE ARTHROSCOPY      NASAL SINUS SURGERY      [facility] SHOULD[facility] SURGICAL BICEPS TENODESIS Right 1/25/2017    Procedure: ARTHROSCOPIC BICEPS TENODESIS;  Surgeon: Sariah Hernandez MD;  [facility];  Service: Orthopedics    [facility] SHLDR ARTHROSCOP,SURG,W/ROTAT CUFF REPR Right 1/25/2017    Procedure: SHOULDER ARTHROSCOPY ROTATOR CUFF REPAIR ;  Surgeon: Sariah Hernandez MD;  [facility];  Service: Orthopedics    [facility] SURGICAL ARTHROSCOPY Pierre Call DBRDMT 3+ Right 1/25/2017    Procedure: ARTHROSCOPY SHOULDER;  Surgeon: Sariah Hernandez MD;  [facility];  Service: Orthopedics    TONSILLECTOMY         Meds/Allergies:  Prior to Admission medications    Medication Sig Start Date End Date Taking?  Authorizing Provider   aspirin 81 MG tablet Take 81 mg by mouth daily    Historical Provider, MD   atenolol (TENORMIN) 50 mg tablet TAKE 1 TABLET DAILY 2/18/22   Jere Hinojosa MD   Cholecalciferol (VITAMIN D3) 2000 UNITS capsule Take by mouth  Patient not taking: Reported on 9/21/2022 6/6/16   Historical Provider, MD   esomeprazole (NexIUM) 20 mg capsule Take 20 mg by mouth every morning before breakfast    Historical Provider, MD   FLUoxetine (PROzac) 20 mg capsule TAKE 1 CAPSULE BY MOUTH EVERY DAY 2/14/22   Jere Hinojosa MD   meloxicam (MOBIC) 15 mg tablet meloxicam 15 mg tablet   TAKE 1 TABLET BY MOUTH EVERY DAY    Historical Provider, MD   multivitamin (THERAGRAN) TABS Take 1 tablet by mouth daily    Historical Provider, MD   olmesartan-hydrochlorothiazide (BENICAR HCT) 20-12 5 MG per tablet TAKE 1 TABLET DAILY 8/1/22   Jere Hinojosa MD   simvastatin (ZOCOR) 20 mg tablet TAKE 1 TABLET DAILY 12/10/21   Jere Hinojosa MD   tamsulosin [facility]) 0 4 mg Take 1 capsule (0 4 mg total) by mouth in the morning 5/10/22   Jere Hinojosa MD     I have reviewed home medications with patient personally  Allergies: Allergies   Allergen Reactions    Amlodipine Swelling    Lisinopril      Other reaction(s): Cough  Other reaction(s): Cough       Social History:  Marital Status: /Civil Union   Occupation:  Retired    Substance Use History:   Social History     Substance and Sexual Activity   Alcohol Use Not Currently    Alcohol/week: 0 0 standard drinks    Comment: social     Social History     Tobacco Use   Smoking Status Former Smoker    Packs/day: 0 25    Years: 30 00    Pack years: 7 50    Types: Cigarettes    Start date:     Quit date:     Years since quittin 7   Smokeless Tobacco Never Used     Social History     Substance and Sexual Activity   Drug Use No       Family History:  Family History   Problem Relation Age of Onset    Cancer Father         Brain tumor    Heart disease Father     Hyperlipidemia Father     Hypertension Father     Brain cancer Father     Migraines Mother     Cancer Brother         Melanoma       Physical Exam:     Vitals:   Blood Pressure: 109/55 (22)  Pulse: 55 (22)  Temperature: (!) 97 °F (36 1 °C) (22 1635)  Temp Source: Oral (22 1635)  Respirations: 20 (22)  SpO2: 94 % (22)    Physical Exam  Constitutional:       General: He is not in acute distress  Appearance: Normal appearance  He is not ill-appearing  HENT:      Head: Normocephalic and atraumatic  Eyes:      Pupils: Pupils are equal, round, and reactive to light  Cardiovascular:      Rate and Rhythm: Regular rhythm  Bradycardia present  Pulses: Normal pulses  Heart sounds: Normal heart sounds  Pulmonary:      Effort: Pulmonary effort is normal  No respiratory distress  Breath sounds: Normal breath sounds  No stridor  No wheezing or rhonchi  Abdominal:      General: Bowel sounds are normal  There is no distension  Palpations: Abdomen is soft  Tenderness: There is no abdominal tenderness  Musculoskeletal:      Cervical back: Normal range of motion and neck supple  Right lower leg: Edema present  Left lower leg: Edema present  Neurological:      General: No focal deficit present  Mental Status: He is alert and oriented to person, place, and time  Mental status is at baseline  Psychiatric:         Mood and Affect: Mood normal          Behavior: Behavior normal          Additional Data:     Lab Results:  Results from last 7 days   Lab Units 09/22/22  1718   WBC Thousand/uL 9 08   HEMOGLOBIN g/dL 15 4   HEMATOCRIT % 45 2   PLATELETS Thousands/uL 366   NEUTROS PCT % 60   LYMPHS PCT % 26   MONOS PCT % 9   EOS PCT % 4     Results from last 7 days   Lab Units 09/22/22  1718 09/20/22  0718   SODIUM mmol/L 140 139   POTASSIUM mmol/L 4 9 4 4   CHLORIDE mmol/L 104 104   CO2 mmol/L 26 30   BUN mg/dL 22 25   CREATININE mg/dL 0 99 1 23   ANION GAP mmol/L 10 5   CALCIUM mg/dL 9 2 9 1   ALBUMIN g/dL  --  3 5   TOTAL BILIRUBIN mg/dL  --  0 94   ALK PHOS U/L  --  49   ALT U/L  --  36   AST U/L  --  19   GLUCOSE RANDOM mg/dL 111  --      Results from last 7 days   Lab Units 09/22/22  1718   INR  1 00         Results from last 7 days   Lab Units 09/20/22  0718   HEMOGLOBIN A1C % 6 2*           EKG and Other Studies Reviewed on Admission:   · EKG: Sinus bradycardia with first-degree AV block  ** Please Note: This note has been constructed using a voice recognition system   **

## 2022-09-23 ENCOUNTER — TELEPHONE (OUTPATIENT)
Dept: CARDIOLOGY CLINIC | Facility: CLINIC | Age: 72
End: 2022-09-23

## 2022-09-23 VITALS
DIASTOLIC BLOOD PRESSURE: 71 MMHG | TEMPERATURE: 98 F | SYSTOLIC BLOOD PRESSURE: 115 MMHG | OXYGEN SATURATION: 95 % | HEART RATE: 56 BPM | RESPIRATION RATE: 18 BRPM

## 2022-09-23 LAB
ANION GAP SERPL CALCULATED.3IONS-SCNC: 5 MMOL/L (ref 4–13)
ATRIAL RATE: 67 BPM
BUN SERPL-MCNC: 23 MG/DL (ref 5–25)
CALCIUM SERPL-MCNC: 9.3 MG/DL (ref 8.3–10.1)
CHEST PAIN STATEMENT: NORMAL
CHLORIDE SERPL-SCNC: 103 MMOL/L (ref 96–108)
CO2 SERPL-SCNC: 30 MMOL/L (ref 21–32)
CREAT SERPL-MCNC: 1.21 MG/DL (ref 0.6–1.3)
GFR SERPL CREATININE-BSD FRML MDRD: 60 ML/MIN/1.73SQ M
GLUCOSE P FAST SERPL-MCNC: 112 MG/DL (ref 65–99)
GLUCOSE SERPL-MCNC: 112 MG/DL (ref 65–140)
KCT BLD-ACNC: 212 SEC (ref 89–137)
KCT BLD-ACNC: 218 SEC (ref 89–137)
MAX DIASTOLIC BP: 76 MMHG
MAX HEART RATE: 123 BPM
MAX PREDICTED HEART RATE: 150 BPM
MAX. SYSTOLIC BP: 184 MMHG
P AXIS: 37 DEGREES
POTASSIUM SERPL-SCNC: 4.4 MMOL/L (ref 3.5–5.3)
PR INTERVAL: 196 MS
PROTOCOL NAME: NORMAL
QRS AXIS: 40 DEGREES
QRSD INTERVAL: 88 MS
QT INTERVAL: 382 MS
QTC INTERVAL: 403 MS
REASON FOR TERMINATION: NORMAL
SODIUM SERPL-SCNC: 138 MMOL/L (ref 135–147)
SPECIMEN SOURCE: ABNORMAL
SPECIMEN SOURCE: ABNORMAL
T WAVE AXIS: 25 DEGREES
TARGET HR FORMULA: NORMAL
TIME IN EXERCISE PHASE: NORMAL
VENTRICULAR RATE: 67 BPM

## 2022-09-23 PROCEDURE — 93010 ELECTROCARDIOGRAM REPORT: CPT | Performed by: INTERNAL MEDICINE

## 2022-09-23 PROCEDURE — 93458 L HRT ARTERY/VENTRICLE ANGIO: CPT | Performed by: INTERNAL MEDICINE

## 2022-09-23 PROCEDURE — 85347 COAGULATION TIME ACTIVATED: CPT

## 2022-09-23 PROCEDURE — C1887 CATHETER, GUIDING: HCPCS | Performed by: INTERNAL MEDICINE

## 2022-09-23 PROCEDURE — 99153 MOD SED SAME PHYS/QHP EA: CPT | Performed by: INTERNAL MEDICINE

## 2022-09-23 PROCEDURE — C1769 GUIDE WIRE: HCPCS | Performed by: INTERNAL MEDICINE

## 2022-09-23 PROCEDURE — 93454 CORONARY ARTERY ANGIO S&I: CPT | Performed by: INTERNAL MEDICINE

## 2022-09-23 PROCEDURE — 80048 BASIC METABOLIC PNL TOTAL CA: CPT | Performed by: STUDENT IN AN ORGANIZED HEALTH CARE EDUCATION/TRAINING PROGRAM

## 2022-09-23 PROCEDURE — C9600 PERC DRUG-EL COR STENT SING: HCPCS | Performed by: INTERNAL MEDICINE

## 2022-09-23 PROCEDURE — C1894 INTRO/SHEATH, NON-LASER: HCPCS | Performed by: INTERNAL MEDICINE

## 2022-09-23 PROCEDURE — C1874 STENT, COATED/COV W/DEL SYS: HCPCS | Performed by: INTERNAL MEDICINE

## 2022-09-23 PROCEDURE — 99204 OFFICE O/P NEW MOD 45 MIN: CPT

## 2022-09-23 PROCEDURE — C1725 CATH, TRANSLUMIN NON-LASER: HCPCS | Performed by: INTERNAL MEDICINE

## 2022-09-23 PROCEDURE — 99152 MOD SED SAME PHYS/QHP 5/>YRS: CPT | Performed by: INTERNAL MEDICINE

## 2022-09-23 PROCEDURE — 92928 PRQ TCAT PLMT NTRAC ST 1 LES: CPT | Performed by: INTERNAL MEDICINE

## 2022-09-23 PROCEDURE — 99217 PR OBSERVATION CARE DISCHARGE MANAGEMENT: CPT | Performed by: STUDENT IN AN ORGANIZED HEALTH CARE EDUCATION/TRAINING PROGRAM

## 2022-09-23 PROCEDURE — NC001 PR NO CHARGE: Performed by: STUDENT IN AN ORGANIZED HEALTH CARE EDUCATION/TRAINING PROGRAM

## 2022-09-23 DEVICE — STENT ONYXNG40015UX ONYX 4.00X15RX
Type: IMPLANTABLE DEVICE | Site: CORONARY | Status: FUNCTIONAL
Brand: ONYX FRONTIER™

## 2022-09-23 RX ORDER — SODIUM CHLORIDE 9 MG/ML
75 INJECTION, SOLUTION INTRAVENOUS CONTINUOUS
Status: DISPENSED | OUTPATIENT
Start: 2022-09-23 | End: 2022-09-23

## 2022-09-23 RX ORDER — ONDANSETRON 2 MG/ML
4 INJECTION INTRAMUSCULAR; INTRAVENOUS EVERY 6 HOURS PRN
Status: DISCONTINUED | OUTPATIENT
Start: 2022-09-23 | End: 2022-09-23 | Stop reason: HOSPADM

## 2022-09-23 RX ORDER — CLOPIDOGREL BISULFATE 75 MG/1
TABLET ORAL AS NEEDED
Status: DISCONTINUED | OUTPATIENT
Start: 2022-09-23 | End: 2022-09-23 | Stop reason: HOSPADM

## 2022-09-23 RX ORDER — FENTANYL CITRATE 50 UG/ML
INJECTION, SOLUTION INTRAMUSCULAR; INTRAVENOUS AS NEEDED
Status: DISCONTINUED | OUTPATIENT
Start: 2022-09-23 | End: 2022-09-23 | Stop reason: HOSPADM

## 2022-09-23 RX ORDER — LOSARTAN POTASSIUM 25 MG/1
25 TABLET ORAL DAILY
Qty: 30 TABLET | Refills: 0 | Status: SHIPPED | OUTPATIENT
Start: 2022-09-24 | End: 2022-10-14 | Stop reason: SDUPTHER

## 2022-09-23 RX ORDER — CLOPIDOGREL BISULFATE 75 MG/1
75 TABLET ORAL DAILY
Qty: 60 TABLET | Refills: 0 | Status: SHIPPED | OUTPATIENT
Start: 2022-09-24 | End: 2022-10-14 | Stop reason: SDUPTHER

## 2022-09-23 RX ORDER — HEPARIN SODIUM 1000 [USP'U]/ML
INJECTION, SOLUTION INTRAVENOUS; SUBCUTANEOUS AS NEEDED
Status: DISCONTINUED | OUTPATIENT
Start: 2022-09-23 | End: 2022-09-23 | Stop reason: HOSPADM

## 2022-09-23 RX ORDER — CLOPIDOGREL BISULFATE 75 MG/1
75 TABLET ORAL DAILY
Status: DISCONTINUED | OUTPATIENT
Start: 2022-09-24 | End: 2022-09-23 | Stop reason: HOSPADM

## 2022-09-23 RX ORDER — MIDAZOLAM HYDROCHLORIDE 2 MG/2ML
INJECTION, SOLUTION INTRAMUSCULAR; INTRAVENOUS AS NEEDED
Status: DISCONTINUED | OUTPATIENT
Start: 2022-09-23 | End: 2022-09-23 | Stop reason: HOSPADM

## 2022-09-23 RX ORDER — LIDOCAINE HYDROCHLORIDE 10 MG/ML
INJECTION, SOLUTION EPIDURAL; INFILTRATION; INTRACAUDAL; PERINEURAL AS NEEDED
Status: DISCONTINUED | OUTPATIENT
Start: 2022-09-23 | End: 2022-09-23 | Stop reason: HOSPADM

## 2022-09-23 RX ORDER — PRAVASTATIN SODIUM 40 MG
40 TABLET ORAL
Qty: 30 TABLET | Refills: 0 | Status: SHIPPED | OUTPATIENT
Start: 2022-09-23 | End: 2022-10-14 | Stop reason: SDUPTHER

## 2022-09-23 RX ORDER — VERAPAMIL HCL 2.5 MG/ML
AMPUL (ML) INTRAVENOUS AS NEEDED
Status: DISCONTINUED | OUTPATIENT
Start: 2022-09-23 | End: 2022-09-23 | Stop reason: HOSPADM

## 2022-09-23 RX ADMIN — LOSARTAN POTASSIUM 25 MG: 25 TABLET, FILM COATED ORAL at 09:06

## 2022-09-23 RX ADMIN — FLUOXETINE 20 MG: 20 CAPSULE ORAL at 09:06

## 2022-09-23 RX ADMIN — TAMSULOSIN HYDROCHLORIDE 0.4 MG: 0.4 CAPSULE ORAL at 09:06

## 2022-09-23 RX ADMIN — PRAVASTATIN SODIUM 40 MG: 40 TABLET ORAL at 16:51

## 2022-09-23 RX ADMIN — ASPIRIN 81 MG: 81 TABLET, CHEWABLE ORAL at 09:06

## 2022-09-23 RX ADMIN — ATENOLOL 50 MG: 50 TABLET ORAL at 09:06

## 2022-09-23 RX ADMIN — PANTOPRAZOLE SODIUM 20 MG: 20 TABLET, DELAYED RELEASE ORAL at 05:09

## 2022-09-23 NOTE — DISCHARGE SUMMARY
Medical Problems             Resolved Problems  Date Reviewed: 9/23/2022   None               Discharging Physician / Practitioner: Doris Mata DO  PCP: Jerlene Bernheim, MD  Admission Date:   Admission Orders (From admission, onward)     Ordered        09/22/22 1737  Place in Observation  Once                      Discharge Date: 09/23/22    Consultations During Hospital Stay:  · Cardiology    Procedures Performed:   · PCI status post stent to LAD    Significant Findings / Test Results:   · See above    Incidental Findings:   · See above    Test Results Pending at Discharge (will require follow up): · None     Outpatient Tests Requested:  · None    Complications:  None    Reason for Admission:  Abnormal nuclear stress test    Hospital Course:   Cole Lanier is a 81 y o  male patient who originally presented to the hospital on 9/22/2022 due to abnormal outpatient nuclear stress test and referred to the ED for admission for possible cardiac catheterization  Patient was chest free  Troponins negative x3  EKG normal sinus rhythm with first-degree block  Echo EF 55%  Underwent cardiac catheterization status post drug-eluting stent placed to LAD  Patient was monitored post catheterization per protocol  No complications  Plavix price checked for 12 dollars  Discharged on aspirin, Plavix, Arb, beta-blocker, statin  Cardiac rehab referral placed  Outpatient follow-up with PCP and Cardiology within 1-2 weeks  Please see above list of diagnoses and related plan for additional information  Condition at Discharge: good    Discharge Day Visit / Exam:   * Please refer to separate progress note for these details *    Discussion with Family: Updated  (daughter) at bedside  Discharge instructions/Information to patient and family:   See after visit summary for information provided to patient and family        Provisions for Follow-Up Care:  See after visit summary for information related to follow-up care and any pertinent home health orders  Disposition:   Home    Planned Readmission:  None     Discharge Statement:  I spent 45 minutes discharging the patient  This time was spent on the day of discharge  I had direct contact with the patient on the day of discharge  Greater than 50% of the total time was spent examining patient, answering all patient questions, arranging and discussing plan of care with patient as well as directly providing post-discharge instructions  Additional time then spent on discharge activities  Discharge Medications:  See after visit summary for reconciled discharge medications provided to patient and/or family        **Please Note: This note may have been constructed using a voice recognition system**

## 2022-09-23 NOTE — UTILIZATION REVIEW
Initial Clinical Review    Admission: Date/Time/Statement:   Admission Orders (From admission, onward)     Ordered        09/22/22 1737  Place in Observation  Once                      Orders Placed This Encounter   Procedures    Place in Observation     Standing Status:   Standing     Number of Occurrences:   1     Order Specific Question:   Level of Care     Answer:   Med Surg [16]     ED Arrival Information     Expected   -    Arrival   9/22/2022 16:26    Acuity   Emergent            Means of arrival   Walk-In    Escorted by   Family Member    Service   Hospitalist    Admission type   Emergency            Arrival complaint   edema/chest discomfort           Chief Complaint   Patient presents with    Evaluation of Abnormal Diagnostic Test     Sent by cardiology after echo and stress; pt c/o edema in lower ext and dyspnea with exertion        Initial Presentation: 81 y o  male to the Ed from home with complaints of abnormal stress test, lowe extremity edema, dyspnea on exertion  Stress EKG showed "Horizontal ST depression (II, III, aVF, V3, V4, V5 and V6) is noted at only 81% of age predicted maximal heart rate  The stress ECG is consistent with ischemia after maximal exercise, with reproduction of symptoms (chest pain)  " and perfusion showed "There is a left ventricular perfusion defect that is medium in size with mild reduction in uptake present in the mid to apical anterior and the apical cap segments that is partially reversible "  Developed chest pain during procedure, but that resolved  He arrives with 1+ bilateral lower extremity edema  Admitted under observation for stable angina, depression  Check card cath  Keep NPO after MN  Date:    9/23  Cardiac cath  9/23:  · Ost LAD lesion is 85% stenosed  Single-vessel CAD of the ostial LAD    Successful 4 0/15 mm on X drug-eluting stent to the ostial LAD          ED Triage Vitals   Temperature Pulse Respirations Blood Pressure SpO2   09/22/22 1635 09/22/22 1635 09/22/22 1635 09/22/22 1635 09/22/22 1635   (!) 97 °F (36 1 °C) 80 18 149/70 99 %      Temp Source Heart Rate Source Patient Position - Orthostatic VS BP Location FiO2 (%)   09/22/22 1635 09/22/22 1730 09/22/22 1830 09/22/22 1730 --   Oral Monitor Lying Right arm       Pain Score       09/23/22 0100       No Pain          Wt Readings from Last 1 Encounters:   09/22/22 102 kg (225 lb)     Additional Vital Signs:   Date/Time Temp Pulse Resp BP MAP (mmHg) SpO2 O2 Device Patient Position - Orthostatic VS   09/23/22 07:23:08 97 7 °F (36 5 °C) 63 18 135/67 90 95 % -- --   09/23/22 03:12:19 97 9 °F (36 6 °C) 65 18 134/66 89 94 % -- --   09/23/22 0100 -- -- -- -- -- 92 % None (Room air) --   09/22/22 23:50:38 97 8 °F (36 6 °C) 60 -- 134/65 88 95 % None (Room air) --   09/22/22 20:18:11 97 8 °F (36 6 °C) 64 17 139/104 Abnormal  116 95 % None (Room air) --   09/22/22 1830 -- 55 20 109/55 79 94 % None (Room air) Lying   09/22/22 1730 -- 63 22 127/72 90 93 % None (Room air) --   09/22/22 1635 97 °F (36 1 °C) Abnormal  80 18 149/70 -- 99 % -- --       Pertinent Labs/Diagnostic Test Results:   9/22 Card stress test: Stress ECG: Horizontal ST depression (II, III, aVF, V3, V4, V5 and V6) is noted at only 81% of age predicted maximal heart rate  The stress ECG is consistent with ischemia after maximal exercise, with reproduction of symptoms (chest pain)    Stress Function: Left ventricular function post-stress is normal  Post-stress ejection fraction is 68 %    Perfusion: There is a left ventricular perfusion defect that is medium in size with mild reduction in uptake present in the mid to apical anterior and the apical cap segments that is partially reversible    Stress Combined Conclusion: The ECG and SPECT imaging portions of the stress study are concordant with findings concerning for stress induced myocardial ischemia    Report given to pt and asked to go to the ER straight from the lab   Pt was chest pain free at the end of the test   9/22 ECHO: Left Ventricle: Left ventricular cavity size is normal  Wall thickness is upper normal  Systolic function is normal (55%)  Wall motion is normal  Diastolic function is normal     Right Ventricle: Right ventricular cavity size is normal  Systolic function is normal     Left Atrium: The atrium is mildly dilated    Mitral Valve: There is mild annular calcification  There is mild regurgitation    Tricuspid Valve: There is mild regurgitation  The right ventricular systolic pressure is mildly elevated (42 mm Hg)    Pulmonic Valve: There is mild regurgitation           XR chest 2 views   Final Result by Andreea Tavares MD (09/23 8403)      No acute cardiopulmonary disease                    Workstation performed: [id number]               Results from last 7 days   Lab Units 09/22/22  1718 09/20/22  0718   WBC Thousand/uL 9 08 7 87   HEMOGLOBIN g/dL 15 4 15 3   HEMATOCRIT % 45 2 43 5   PLATELETS Thousands/uL 366 342   NEUTROS ABS Thousands/µL 5 37 4 28         Results from last 7 days   Lab Units 09/22/22  1718 09/20/22  0718   SODIUM mmol/L 140 139   POTASSIUM mmol/L 4 9 4 4   CHLORIDE mmol/L 104 104   CO2 mmol/L 26 30   ANION GAP mmol/L 10 5   BUN mg/dL 22 25   CREATININE mg/dL 0 99 1 23   EGFR ml/min/1 73sq m 76 59   CALCIUM mg/dL 9 2 9 1     Results from last 7 days   Lab Units 09/20/22  0718   AST U/L 19   ALT U/L 36   ALK PHOS U/L 49   TOTAL PROTEIN g/dL 6 6   ALBUMIN g/dL 3 5   TOTAL BILIRUBIN mg/dL 0 94         Results from last 7 days   Lab Units 09/22/22  1718   GLUCOSE RANDOM mg/dL 111         Results from last 7 days   Lab Units 09/20/22  0718   HEMOGLOBIN A1C % 6 2*   EAG mg/dl 131       Results from last 7 days   Lab Units 09/22/22  2154 09/22/22  1908 09/22/22  1718   HS TNI 0HR ng/L  --   --  18   HS TNI 2HR ng/L  --  22  --    HSTNI D2 ng/L  --  4  --    HS TNI 4HR ng/L 12  --   --    HSTNI D4 ng/L -6  --   --          Results from last 7 days   Lab Units 09/22/22  1718   PROTIME seconds 13 0   INR  1 00   PTT seconds 28       Results from last 7 days   Lab Units 09/22/22  1718   NT-PRO BNP pg/mL 54       ED Treatment:   Medication Administration from 09/22/2022 1626 to 09/22/2022 2002       Date/Time Order Dose Route Action Comments     09/22/2022 1907 pravastatin (PRAVACHOL) tablet 40 mg 40 mg Oral Given      09/22/2022 1907 tamsulosin (FLOMAX) capsule 0 4 mg 0 4 mg Oral Given         Past Medical History:   Diagnosis Date    Arthritis 2012    Depression 2012    Hyperlipidemia     Hypertension        Admitting Diagnosis: Dyspnea on exertion [R06 00]  Stable angina (HCC) [I20 8]  Abnormal cardiovascular stress test [R94 39]  Exertional chest pain [R07 9]  Age/Sex: 81 y o  male  Admission Orders:  Scheduled Medications:  aspirin, 81 mg, Oral, Daily  atenolol, 50 mg, Oral, Daily  enoxaparin, 40 mg, Subcutaneous, Daily  FLUoxetine, 20 mg, Oral, Daily  losartan, 25 mg, Oral, Daily   And  hydrochlorothiazide, 12 5 mg, Oral, Daily  pantoprazole, 20 mg, Oral, Early Morning  pravastatin, 40 mg, Oral, Daily With Dinner  tamsulosin, 0 4 mg, Oral, Daily      Continuous IV Infusions:     PRN Meds:       IP CONSULT TO CARDIOLOGY    Network Utilization Review Department  ATTENTION: Please call with any questions or concerns to [phone] and carefully listen to the prompts so that you are directed to the right person  All voicemails are confidential   [facility] all requests for admission clinical reviews, approved or denied determinations and any other requests to dedicated fax number below belonging to the campus where the patient is receiving treatment   List of dedicated fax numbers for the Facilities:  [address] DENIALS (Administrative/Medical Necessity) [phone]   [address] (Maternity/NICU/Pediatrics) [address] [place] [address],[address] [place] [address] [phone]   Zayra Alba [address] [facility] [address] [facility] [place] [address] [address] [place] [address] [address] [place] [address] [phone]

## 2022-09-23 NOTE — ASSESSMENT & PLAN NOTE
Present on admission history of depression  Denies suicidal, homicidal ideation  Resume home dose of Prozac  Outpatient follow-up

## 2022-09-23 NOTE — PLAN OF CARE
Problem: PAIN - ADULT  Goal: Verbalizes/displays adequate comfort level or baseline comfort level  Description: Interventions:  - Encourage patient to monitor pain and request assistance  - Assess pain using appropriate pain scale  - Administer analgesics based on type and severity of pain and evaluate response  - Implement non-pharmacological measures as appropriate and evaluate response  - Consider cultural and social influences on pain and pain management  - Notify physician/advanced practitioner if interventions unsuccessful or patient reports new pain  Outcome: Progressing     Problem: INFECTION - ADULT  Goal: Absence or prevention of progression during hospitalization  Description: INTERVENTIONS:  - Assess and monitor for signs and symptoms of infection  - Monitor lab/diagnostic results  - Monitor all insertion sites, i e  indwelling lines, tubes, and drains  - Monitor endotracheal if appropriate and nasal secretions for changes in amount and color  - [place] appropriate cooling/warming therapies per order  - Administer medications as ordered  - Instruct and encourage patient and family to use good hand hygiene technique  - Identify and instruct in appropriate isolation precautions for identified infection/condition  Outcome: Progressing  Goal: Absence of fever/infection during neutropenic period  Description: INTERVENTIONS:  - Monitor WBC    Outcome: Progressing     Problem: SAFETY ADULT  Goal: Patient will remain free of falls  Description: INTERVENTIONS:  - Educate patient/family on patient safety including physical limitations  - Instruct patient to call for assistance with activity   - Consult OT/PT to assist with strengthening/mobility   - Keep Call bell within reach  - Keep bed low and locked with side rails adjusted as appropriate  - Keep care items and personal belongings within reach  - Initiate and maintain comfort rounds  - Make Fall Risk Sign visible to staff  - Offer Toileting every  Hours, in advance of need  - Initiate/Maintain alarm  - Obtain necessary fall risk management equipment:   - Apply yellow socks and bracelet for high fall risk patients  - Consider moving patient to room near nurses station  Outcome: Progressing  Goal: Maintain or return to baseline ADL function  Description: INTERVENTIONS:  -  Assess patient's ability to carry out ADLs; assess patient's baseline for ADL function and identify physical deficits which impact ability to perform ADLs (bathing, care of mouth/teeth, toileting, grooming, dressing, etc )  - Assess/evaluate cause of self-care deficits   - Assess range of motion  - Assess patient's mobility; develop plan if impaired  - Assess patient's need for assistive devices and provide as appropriate  - Encourage maximum independence but intervene and supervise when necessary  - Involve family in performance of ADLs  - Assess for home care needs following discharge   - Consider OT consult to assist with ADL evaluation and planning for discharge  - Provide patient education as appropriate  Outcome: Progressing  Goal: Maintains/Returns to pre admission functional level  Description: INTERVENTIONS:  - Perform BMAT or MOVE assessment daily    - Set and communicate daily mobility goal to care team and patient/family/caregiver  - Collaborate with rehabilitation services on mobility goals if consulted  - Perform Range of Motion  times a day  - Reposition patient every  hours    - Dangle patient  times a day  - Stand patient  times a day  - Ambulate patient  times a day  - Out of bed to chair  times a day   - Out of bed for meals  times a day  - Out of bed for toileting  - Record patient progress and toleration of activity level   Outcome: Progressing     Problem: DISCHARGE PLANNING  Goal: Discharge to home or other facility with appropriate resources  Description: INTERVENTIONS:  - Identify barriers to discharge w/patient and caregiver  - Arrange for needed discharge resources and transportation as appropriate  - Identify discharge learning needs (meds, wound care, etc )  - Arrange for interpretive services to assist at discharge as needed  - Refer to Case Management Department for coordinating discharge planning if the patient needs post-hospital services based on physician/advanced practitioner order or complex needs related to functional status, cognitive ability, or social support system  Outcome: Progressing     Problem: Knowledge Deficit  Goal: Patient/family/caregiver demonstrates understanding of disease process, treatment plan, medications, and discharge instructions  Description: Complete learning assessment and assess knowledge base    Interventions:  - Provide teaching at level of understanding  - Provide teaching via preferred learning methods  Outcome: Progressing     Problem: CARDIOVASCULAR - ADULT  Goal: Maintains optimal cardiac output and hemodynamic stability  Description: INTERVENTIONS:  - Monitor I/O, vital signs and rhythm  - Monitor for S/S and trends of decreased cardiac output  - Administer and titrate ordered vasoactive medications to optimize hemodynamic stability  - Assess quality of pulses, skin color and temperature  - Assess for signs of decreased coronary artery perfusion  - Instruct patient to report change in severity of symptoms  Outcome: Progressing  Goal: Absence of cardiac dysrhythmias or at baseline rhythm  Description: INTERVENTIONS:  - Continuous cardiac monitoring, vital signs, obtain 12 lead EKG if ordered  - Administer antiarrhythmic and heart rate control medications as ordered  - Monitor electrolytes and administer replacement therapy as ordered  Outcome: Progressing

## 2022-09-23 NOTE — ASSESSMENT & PLAN NOTE
81year old male patient past medical history of hyperlipidemia, hypertension, patient has been having chest pain on exertion, relieved by rest, associated with bilateral extremity swelling  Patient had outpatient abnormal nuclear stress test today and was referred to emergency room for admission for possible cardiac catheterization tomorrow  Patient was chest pain-free after stress test as per report  Today's labs reviewed and noted grossly unremarkable for acute finding pain  Troponin negative x1  EKG noted with sinus bradycardia with first-degree block  Echo report noted with normal EF 55%, aortic valve is trileaflet  Currently patient is chest pain-free  Hemodynamically stable  Will continue with aspirin, statin  Status post cardiac catheterization, FRANCISCO J to LAD  Continue telemetry monitor  Dual-platelet therapy with aspirin and Plavix, sent for price check  Patient is agreeable with above plan

## 2022-09-23 NOTE — TELEPHONE ENCOUNTER
----- Message from Sophia Aguiar PA-C sent at 2022 12:17 PM EDT -----  [address] f/u  Patient clinical visit in 1 week at the [address] [place] [address] location: [place] office       Schedule visit with Dr Genoveva Laureano     Type of Visit: VISIT TYPE: in-person office visit  Test ordered: None    Additional Details:  Follow up cardiac cath with stent to ostial LAD

## 2022-09-23 NOTE — CONSULTS
Consultation - Cardiology   Jose D Gilliland 81 y o  male MRN: [id number]  Unit/Bed#: -01 Encounter: [id number]  22  10:17 AM    Assessment/ Plan:  1  Progressive new onset angina with abnormal stress test   EKG and perfusion abnormalities on outpatient stress   Troponin's negative x3   EKG: sinus bradycardia with 1st degree AV block   TTE shows EF 55%   Cardiac cath performed with stent placed to ostial LAD   DAPT with ASA 81 mg and Plavix 75 mg   Continue Losartan 25 mg, Atenolol 50 mg and Pravastatin 40 mg  2  Hypertension    Currently 114/77   Continue Losartan 25 mg and Atenolol 50 mg   Continue to monitor    3  Hyperlipidemia  · Continue Pravastatin 40 mg    4  Depression   Continue Prozac   Denies suicidal/homicidal ideation   Management per Internal medicine    Patient is cleared from a cardiology standpoint for discharge to home; DAPT with ASA 81 mg and Plavix 75 mg  Dicussed with patients family and Internal Medinice  Will follow up with patient at office in 1 week    History of Present Illness   Physician Requesting Consult: Enrico Landeros DO    Reason for Consult / Principal Problem: Abnormal stress test    HPI: Jose D Gilliland is a 81y o  year old male with PMH of HLD, HTN and depression who presents to ED after completing an abnormal stress test  Reports episodes of burning CP with exertion and bilateral LE edema  Currently denies CP, SOB, or diaphoresis at this time  Past history of smoking; rare use of alcohol; denies substance abuse  Inpatient consult to Cardiology  Consult performed by: Lynda Bullock PA-C  Consult ordered by: Jovon Douglas MD        EK22 Normal sinus rhythm      Review of Systems   Constitutional: Negative for chills, diaphoresis and fever  HENT: Negative for ear pain and sore throat  Eyes: Negative for pain and visual disturbance  Respiratory: Negative for cough and shortness of breath      Cardiovascular: Positive for chest pain (with exertion) and leg swelling  Negative for palpitations  Gastrointestinal: Negative for abdominal pain and vomiting  Genitourinary: Negative for dysuria and hematuria  Musculoskeletal: Negative for arthralgias and back pain  Skin: Negative for color change and rash  Neurological: Negative for seizures and syncope  Psychiatric/Behavioral: Negative for confusion  All other systems reviewed and are negative        Historical Information   Past Medical History:   Diagnosis Date    Arthritis 2012    Depression     Hyperlipidemia     Hypertension      Past Surgical History:   Procedure Laterality Date    ABDOMINAL SURGERY      KNEE ARTHROSCOPY      NASAL SINUS SURGERY      MI ARTHROSCOPY SHOULDER SURGICAL BICEPS TENODESIS Right 2017    Procedure: ARTHROSCOPIC BICEPS TENODESIS;  Surgeon: Chad Shepard MD;  Location: [facility];  Service: Orthopedics    MI SHLDR ARTHROSCOP,SURG,W/ROTAT CUFF REPR Right 2017    Procedure: SHOULDER ARTHROSCOPY ROTATOR CUFF REPAIR ;  Surgeon: Chad Shepard MD;  [facility];  Service: Orthopedics    MI SURGICAL ARTHROSCOPY Natasha Fears DBRDMT 3+ Right 2017    Procedure: ARTHROSCOPY SHOULDER;  Surgeon: Chad Shepard MD;  Location[facility];  Service: Orthopedics    TONSILLECTOMY       Social History     Substance and Sexual Activity   Alcohol Use Not Currently    Alcohol/week: 0 0 standard drinks    Comment: social     Social History     Substance and Sexual Activity   Drug Use No     Social History     Tobacco Use   Smoking Status Former Smoker    Packs/day: 0 25    Years: 30 00    Pack years: 7 50    Types: Cigarettes    Start date: 5    Quit date: 18    Years since quittin 7   Smokeless Tobacco Never Used       Family History:   Family History   Problem Relation Age of Onset    Cancer Father         Brain tumor    Heart disease Father     Hyperlipidemia Father     Hypertension Father     Brain cancer Father     Migraines Mother     Cancer Brother         Melanoma       Meds/Allergies   all current active meds have been reviewed  Allergies   Allergen Reactions    Amlodipine Swelling    Lisinopril      Other reaction(s): Cough  Other reaction(s): Cough       Objective   Vitals: Blood pressure 131/67, pulse 56, temperature 98 2 °F (36 8 °C), resp  rate 18, SpO2 97 %  , There is no height or weight on file to calculate BMI ,   Orthostatic Blood Pressures    Flowsheet Row Most Recent Value   Blood Pressure 131/67 filed at 09/23/2022 3684   Patient Position - Orthostatic VS Lying filed at 09/23/2022 4841          Systolic (29UUW), NFQ:109 , Min:104 , TAX:372     Diastolic (05DCC), RHU:37, Min:55, Max:104      No intake or output data in the 24 hours ending 09/23/22 1017    Invasive Devices  Report    Peripheral Intravenous Line  Duration           Peripheral IV 09/22/22 Right Forearm <1 day                    Physical Exam:  Physical Exam  Vitals and nursing note reviewed  Constitutional:       Appearance: He is well-developed  HENT:      Head: Normocephalic and atraumatic  Eyes:      Conjunctiva/sclera: Conjunctivae normal    Cardiovascular:      Rate and Rhythm: Normal rate and regular rhythm  Pulses: Normal pulses  Heart sounds: Normal heart sounds  No murmur heard  No friction rub  Pulmonary:      Effort: Pulmonary effort is normal  No respiratory distress  Breath sounds: Normal breath sounds  No wheezing or rales  Chest:      Chest wall: No tenderness  Abdominal:      Palpations: Abdomen is soft  Tenderness: There is no abdominal tenderness  Musculoskeletal:      Cervical back: Neck supple  Right lower leg: No edema  Left lower leg: No edema  Skin:     General: Skin is warm and dry  Neurological:      Mental Status: He is alert and oriented to person, place, and time     Psychiatric:         Mood and Affect: Mood normal           Lab Results:     Cardiac Profile:   Results from last 7 days   Lab Units 09/22/22  2154 09/22/22  1908 09/22/22  1718   HS TNI 0HR ng/L  --   --  18   HS TNI 2HR ng/L  --  22  --    HSTNI D2 ng/L  --  4  --    HS TNI 4HR ng/L 12  --   --    HSTNI D4 ng/L -6  --   --    NT-PRO BNP pg/mL  --   --  54       CBC with diff:   Results from last 7 days   Lab Units 09/22/22 1718 09/20/22  0718   WBC Thousand/uL 9 08 7 87   HEMOGLOBIN g/dL 15 4 15 3   HEMATOCRIT % 45 2 43 5   MCV fL 89 89   PLATELETS Thousands/uL 366 342   MCH pg 30 4 31 4   MCHC g/dL 34 1 35 2   RDW % 16 0* 15 9*   MPV fL 10 4 10 8   NRBC AUTO /100 WBCs 0 0         CMP:   Results from last 7 days   Lab Units 09/23/22  0906 09/22/22 1718 09/20/22  0718   POTASSIUM mmol/L 4 4 4 9 4 4   CHLORIDE mmol/L 103 104 104   CO2 mmol/L 30 26 30   BUN mg/dL 23 22 25   CREATININE mg/dL 1 21 0 99 1 23   CALCIUM mg/dL 9 3 9 2 9 1   AST U/L  --   --  19   ALT U/L  --   --  36   ALK PHOS U/L  --   --  49   EGFR ml/min/1 73sq m 60 76 59

## 2022-09-23 NOTE — PLAN OF CARE
Problem: PAIN - ADULT  Goal: Verbalizes/displays adequate comfort level or baseline comfort level  Description: Interventions:  - Encourage patient to monitor pain and request assistance  - Assess pain using appropriate pain scale  - Administer analgesics based on type and severity of pain and evaluate response  - Implement non-pharmacological measures as appropriate and evaluate response  - Consider cultural and social influences on pain and pain management  - Notify physician/advanced practitioner if interventions unsuccessful or patient reports new pain  Outcome: Progressing     Problem: INFECTION - ADULT  Goal: Absence or prevention of progression during hospitalization  Description: INTERVENTIONS:  - Assess and monitor for signs and symptoms of infection  - Monitor lab/diagnostic results  - Monitor all insertion sites, i e  indwelling lines, tubes, and drains  - Monitor endotracheal if appropriate and nasal secretions for changes in amount and color  - [place] appropriate cooling/warming therapies per order  - Administer medications as ordered  - Instruct and encourage patient and family to use good hand hygiene technique  - Identify and instruct in appropriate isolation precautions for identified infection/condition  Outcome: Progressing  Goal: Absence of fever/infection during neutropenic period  Description: INTERVENTIONS:  - Monitor WBC    Outcome: Progressing     Problem: SAFETY ADULT  Goal: Patient will remain free of falls  Description: INTERVENTIONS:  - Educate patient/family on patient safety including physical limitations  - Instruct patient to call for assistance with activity   - Consult OT/PT to assist with strengthening/mobility   - Keep Call bell within reach  - Keep bed low and locked with side rails adjusted as appropriate  - Keep care items and personal belongings within reach  - Initiate and maintain comfort rounds  - Make Fall Risk Sign visible to staff  - Obtain necessary fall risk management equipment  - Apply yellow socks and bracelet for high fall risk patients  - Consider moving patient to room near nurses station  Outcome: Progressing  Goal: Maintain or return to baseline ADL function  Description: INTERVENTIONS:  -  Assess patient's ability to carry out ADLs; assess patient's baseline for ADL function and identify physical deficits which impact ability to perform ADLs (bathing, care of mouth/teeth, toileting, grooming, dressing, etc )  - Assess/evaluate cause of self-care deficits   - Assess range of motion  - Assess patient's mobility; develop plan if impaired  - Assess patient's need for assistive devices and provide as appropriate  - Encourage maximum independence but intervene and supervise when necessary  - Involve family in performance of ADLs  - Assess for home care needs following discharge   - Consider OT consult to assist with ADL evaluation and planning for discharge  - Provide patient education as appropriate  Outcome: Progressing  Goal: Maintains/Returns to pre admission functional level  Description: INTERVENTIONS:  - Perform BMAT or MOVE assessment daily    - Set and communicate daily mobility goal to care team and patient/family/caregiver  - Collaborate with rehabilitation services on mobility goals if consulted  - Perform Range of Motion 3 times a day    - Dangle patient 3 times a day  - Stand patient 3 times a day  - Ambulate patient 3 times a day  - Out of bed to chair 3 times a day   - Out of bed for meals 3 times a day  - Out of bed for toileting  - Record patient progress and toleration of activity level   Outcome: Progressing     Problem: DISCHARGE PLANNING  Goal: Discharge to home or other facility with appropriate resources  Description: INTERVENTIONS:  - Identify barriers to discharge w/patient and caregiver  - Arrange for needed discharge resources and transportation as appropriate  - Identify discharge learning needs (meds, wound care, etc )  - Arrange for interpretive services to assist at discharge as needed  - Refer to Case Management Department for coordinating discharge planning if the patient needs post-hospital services based on physician/advanced practitioner order or complex needs related to functional status, cognitive ability, or social support system  Outcome: Progressing     Problem: Knowledge Deficit  Goal: Patient/family/caregiver demonstrates understanding of disease process, treatment plan, medications, and discharge instructions  Description: Complete learning assessment and assess knowledge base    Interventions:  - Provide teaching at level of understanding  - Provide teaching via preferred learning methods  Outcome: Progressing     Problem: CARDIOVASCULAR - ADULT  Goal: Maintains optimal cardiac output and hemodynamic stability  Description: INTERVENTIONS:  - Monitor I/O, vital signs and rhythm  - Monitor for S/S and trends of decreased cardiac output  - Administer and titrate ordered vasoactive medications to optimize hemodynamic stability  - Assess quality of pulses, skin color and temperature  - Assess for signs of decreased coronary artery perfusion  - Instruct patient to report change in severity of symptoms  Outcome: Progressing  Goal: Absence of cardiac dysrhythmias or at baseline rhythm  Description: INTERVENTIONS:  - Continuous cardiac monitoring, vital signs, obtain 12 lead EKG if ordered  - Administer antiarrhythmic and heart rate control medications as ordered  - Monitor electrolytes and administer replacement therapy as ordered  Outcome: Progressing

## 2022-09-23 NOTE — PROGRESS NOTES
[address] [facility]  Progress Note Julee [address] 1950, 81 y o  male MRN: [id number]  Unit/Bed#: -01 Encounter: [id number]  Primary Care Provider: Joanne Esparza MD   Date and time admitted to hospital: 9/22/2022  4:38 PM    Depression  Assessment & Plan  Present on admission history of depression  Denies suicidal, homicidal ideation  Resume home dose of Prozac  Outpatient follow-up  Hyperlipidemia  Assessment & Plan  Present on admission with history of hyperlipidemia  Resume home dose of simvastatin  Benign hypertension  Assessment & Plan  Present on admission history of hypertension  Blood pressure control  Resume home dose of atenolol, Benicar equivalent  * Stable angina [facility])  Assessment & Plan   81year old male patient past medical history of hyperlipidemia, hypertension, patient has been having chest pain on exertion, relieved by rest, associated with bilateral extremity swelling  Patient had outpatient abnormal nuclear stress test today and was referred to emergency room for admission for possible cardiac catheterization tomorrow  Patient was chest pain-free after stress test as per report  Today's labs reviewed and noted grossly unremarkable for acute finding pain  Troponin negative x1  EKG noted with sinus bradycardia with first-degree block  Echo report noted with normal EF 55%, aortic valve is trileaflet  Currently patient is chest pain-free  Hemodynamically stable  Will continue with aspirin, statin  Status post cardiac catheterization, FRANCISCO J to LAD  Continue telemetry monitor  Dual-platelet therapy with aspirin and Plavix, sent for price check  Patient is agreeable with above plan  VTE Pharmacologic Prophylaxis: VTE Score: 4 Low Risk (Score 0-2) - Encourage Ambulation  Patient Centered Rounds: I performed bedside rounds with nursing staff today    Discussions with Specialists or Other Care Team Provider:  Cardiology    Education and Discussions with Family / Patient: Updated  (daughter) at bedside  Time Spent for Care: 45 minutes  More than 50% of total time spent on counseling and coordination of care as described above  Current Length of Stay: 0 day(s)  Current Patient Status: Observation   Certification Statement: Likely discharge later today  Discharge Plan: discharged later today    Code Status: Level 1 - Full Code    Subjective:   Patient has no acute complaints at this time  Seen after cardiac catheterization  Chest pain free  Denies nausea, vomiting diarrhea constipation, fevers or chills  All other review systems negative this time  Objective:     Vitals:   Temp (24hrs), Av 8 °F (36 6 °C), Min:97 °F (36 1 °C), Max:98 2 °F (36 8 °C)    Temp:  [97 °F (36 1 °C)-98 2 °F (36 8 °C)] 97 5 °F (36 4 °C)  HR:  [55-80] 56  Resp:  [17-22] 18  BP: (109-149)/() 113/70  SpO2:  [92 %-99 %] 96 %  There is no height or weight on file to calculate BMI  Input and Output Summary (last 24 hours): Intake/Output Summary (Last 24 hours) at 2022 1539  Last data filed at 2022 1132  Gross per 24 hour   Intake --   Output 5 ml   Net -5 ml       Physical Exam:   Physical Exam  Vitals reviewed  Constitutional:       General: He is not in acute distress  Appearance: He is well-developed  He is not diaphoretic  HENT:      Head: Normocephalic and atraumatic  Mouth/Throat:      Pharynx: No oropharyngeal exudate  Eyes:      General: No scleral icterus  Extraocular Movements: Extraocular movements intact  Conjunctiva/sclera: Conjunctivae normal    Neck:      Vascular: No JVD  Trachea: No tracheal deviation  Cardiovascular:      Rate and Rhythm: Normal rate and regular rhythm  Heart sounds: No murmur heard  No friction rub  No gallop  Pulmonary:      Effort: Pulmonary effort is normal  No respiratory distress  Breath sounds: No stridor  No wheezing     Abdominal: General: There is no distension  Palpations: Abdomen is soft  There is no mass  Tenderness: There is no abdominal tenderness  There is no right CVA tenderness or left CVA tenderness  Musculoskeletal:         General: No tenderness  Normal range of motion  Right lower leg: No edema  Left lower leg: No edema  Skin:     General: Skin is warm and dry  Coloration: Skin is not pale  Findings: No erythema  Neurological:      Mental Status: He is alert and oriented to person, place, and time  Psychiatric:         Behavior: Behavior normal          Thought Content: Thought content normal           Additional Data:     Labs:  Results from last 7 days   Lab Units 09/22/22  1718   WBC Thousand/uL 9 08   HEMOGLOBIN g/dL 15 4   HEMATOCRIT % 45 2   PLATELETS Thousands/uL 366   NEUTROS PCT % 60   LYMPHS PCT % 26   MONOS PCT % 9   EOS PCT % 4     Results from last 7 days   Lab Units 09/23/22  0906 09/22/22  1718 09/20/22  0718   SODIUM mmol/L 138   < > 139   POTASSIUM mmol/L 4 4   < > 4 4   CHLORIDE mmol/L 103   < > 104   CO2 mmol/L 30   < > 30   BUN mg/dL 23   < > 25   CREATININE mg/dL 1 21   < > 1 23   ANION GAP mmol/L 5   < > 5   CALCIUM mg/dL 9 3   < > 9 1   ALBUMIN g/dL  --   --  3 5   TOTAL BILIRUBIN mg/dL  --   --  0 94   ALK PHOS U/L  --   --  49   ALT U/L  --   --  36   AST U/L  --   --  19   GLUCOSE RANDOM mg/dL 112   < >  --     < > = values in this interval not displayed       Results from last 7 days   Lab Units 09/22/22  1718   INR  1 00         Results from last 7 days   Lab Units 09/20/22  0718   HEMOGLOBIN A1C % 6 2*           Lines/Drains:  Invasive Devices  Report    Peripheral Intravenous Line  Duration           Peripheral IV 09/22/22 Right Forearm <1 day                  Telemetry:  Telemetry Orders (From admission, onward)             48 Hour Telemetry Monitoring  Continuous x 48 hours        References:    Telemetry Guidelines   Question:  Reason for 48 Hour Telemetry Answer:  Acute MI, chest pain - R/O MI, or unstable angina                 Telemetry Reviewed: Normal Sinus Rhythm  Indication for Continued Telemetry Use: Acute MI/Unstable Angina/Rule out ACS             Imaging: Reviewed radiology reports from this admission including: ECHO    Recent Cultures (last 7 days):         Last 24 Hours Medication List:   Current Facility-Administered Medications   Medication Dose Route Frequency Provider Last Rate    aspirin  81 mg Oral Daily Lito VILLALTA MD      atenolol  50 mg Oral Daily Lito VILLALTA MD      [START ON 9/24/2022] clopidogrel  75 mg Oral Daily Jaime Brooks PA-C      FLUoxetine  20 mg Oral Daily Lito VILLALTA MD      losartan  25 mg Oral Daily Lito VILLALTA MD      ondansetron  4 mg Intravenous Q6H PRN Yessica Brar PA-C      pantoprazole  20 mg Oral Early Morning Lito VILLALTA MD      pravastatin  40 mg Oral Daily With Dinner Tracy Ling MD      sodium chloride  75 mL/hr Intravenous Continuous Yessica Brar PA-C      tamsulosin  0 4 mg Oral Daily Tracy Ling MD          Today, Patient Was Seen By: Randell Oviedo DO    **Please Note: This note may have been constructed using a voice recognition system  **

## 2022-09-26 ENCOUNTER — TELEPHONE (OUTPATIENT)
Dept: INTERNAL MEDICINE CLINIC | Facility: CLINIC | Age: 72
End: 2022-09-26

## 2022-09-26 ENCOUNTER — TELEPHONE (OUTPATIENT)
Dept: CARDIOLOGY CLINIC | Facility: CLINIC | Age: 72
End: 2022-09-26

## 2022-09-26 ENCOUNTER — TRANSITIONAL CARE MANAGEMENT (OUTPATIENT)
Dept: INTERNAL MEDICINE CLINIC | Facility: CLINIC | Age: 72
End: 2022-09-26

## 2022-09-26 NOTE — TELEPHONE ENCOUNTER
Patient Pura Chester [phone] contacting office after having left heart cardiac cath  Patient is occasionally having discomfort after eating like heartburn  Patient states he also is having left sided aching  Is this normal after cath ?

## 2022-09-26 NOTE — TELEPHONE ENCOUNTER
This sounds moreso related to heartburn since it correlates with food, however, if he starts to develop significant chest burning similar to that prior to his stent placement then he should go to the ED for further evaluation

## 2022-09-27 ENCOUNTER — OFFICE VISIT (OUTPATIENT)
Dept: CARDIOLOGY CLINIC | Facility: CLINIC | Age: 72
End: 2022-09-27
Payer: COMMERCIAL

## 2022-09-27 VITALS
DIASTOLIC BLOOD PRESSURE: 62 MMHG | OXYGEN SATURATION: 95 % | HEART RATE: 67 BPM | WEIGHT: 215 LBS | HEIGHT: 72 IN | RESPIRATION RATE: 16 BRPM | SYSTOLIC BLOOD PRESSURE: 142 MMHG | BODY MASS INDEX: 29.12 KG/M2

## 2022-09-27 DIAGNOSIS — I10 PRIMARY HYPERTENSION: ICD-10-CM

## 2022-09-27 DIAGNOSIS — I25.10 CORONARY ARTERY DISEASE INVOLVING NATIVE CORONARY ARTERY OF NATIVE HEART WITHOUT ANGINA PECTORIS: Primary | ICD-10-CM

## 2022-09-27 DIAGNOSIS — E78.2 MIXED HYPERLIPIDEMIA: ICD-10-CM

## 2022-09-27 PROCEDURE — 1111F DSCHRG MED/CURRENT MED MERGE: CPT | Performed by: NURSE PRACTITIONER

## 2022-09-27 PROCEDURE — 99214 OFFICE O/P EST MOD 30 MIN: CPT | Performed by: NURSE PRACTITIONER

## 2022-09-27 PROCEDURE — 3078F DIAST BP <80 MM HG: CPT | Performed by: NURSE PRACTITIONER

## 2022-09-27 PROCEDURE — 1160F RVW MEDS BY RX/DR IN RCRD: CPT | Performed by: NURSE PRACTITIONER

## 2022-09-27 PROCEDURE — 3077F SYST BP >= 140 MM HG: CPT | Performed by: NURSE PRACTITIONER

## 2022-09-27 NOTE — PROGRESS NOTES
Cardiology Office Note    Karel Garcia 81 y o  male MRN: [id number]    09/27/22          Assessment/Plan:    1  Coronary artery disease s/p FRANCISCO J x1 to ostial LAD  -complaints of heartburn type pain intermittently, likely related to heartburn as patient has recently ran out of Nexium, will resume and call us if this does not resolve it  -continue aspirin, Plavix, atenolol and pravastatin  -recommend cardiac diet  -patient start cardiac rehab in the near future    2  Hypertension   -blood pressure is overall well controlled  -continue atenolol and losartan  -patient courage to monitor his blood pressures at home    3  Hyperlipidemia  -most recent lipid panel reviewed  -continue pravastatin    Follow up: 4 months or sooner as needed    1  Coronary artery disease involving native coronary artery of native heart without angina pectoris     2  Mixed hyperlipidemia     3  Primary hypertension         HPI: Karel Garcia is a 81y o  year old male with past medical history of CAD s/p FRANCISCO J x1 to ostial LAD, hypertension, hyperlipidemia and depression who presents today for hospital follow-up  He was admitted to [facility] [place] on 09/23/2022 for chest pain after having an abnormal outpatient stress test and being referred to the emergency room for further evaluation  He underwent cardiac catheterization which revealed 85% ostial LAD lesion that received FRANCISCO J x1  A TTE also performed at that time revealed normal systolic function with an EF 55%, no regional wall motion abnormalities were noted  EKG also revealed sinus rhythm with first-degree AV block  He denies chest pain, dyspnea on exertion or rest, or palpitations and was instructed to call  the office or seek medical attention if any such symptoms develop  He does state that he does intermittently feel heartburn type pain, and states that he recently ran out of his Nexium recently    He will resume this and if it is heartburn type chest discomfort does not resolve patient notify the office  He also has bilateral lower extremity ankle edema, which he states he has been dealing with for years  He states that is improved once he elevates his legs at night  He was also instructed that if that should get worse he should notify us soon as possible  All medications reviewed and patient is tolerating medications without side effects  Social history:   Former smoker, quit 40 years ago      Patient Active Problem List   Diagnosis    Complete tear of left rotator cuff    Biceps tendinitis    Benign hypertension    Allergic rhinitis    Hyperlipidemia    Depression    Impaired fasting glucose    Vitamin D deficiency    Bilateral primary osteoarthritis of knee    Primary osteoarthritis of right knee    Primary osteoarthritis of left knee    Chronic venous insufficiency    Stable angina (HCC)       Allergies   Allergen Reactions    Amlodipine Swelling    Lisinopril      Other reaction(s): Cough  Other reaction(s): Cough         Current Outpatient Medications:     aspirin 81 MG tablet, Take 81 mg by mouth daily, Disp: , Rfl:     atenolol (TENORMIN) 50 mg tablet, TAKE 1 TABLET DAILY, Disp: 90 tablet, Rfl: 3    Cholecalciferol (VITAMIN D3) 2000 UNITS capsule, Take by mouth (Patient not taking: Reported on 9/21/2022), Disp: , Rfl:     clopidogrel (PLAVIX) 75 mg tablet, Take 1 tablet (75 mg total) by mouth daily, Disp: 60 tablet, Rfl: 0    esomeprazole (NexIUM) 20 mg capsule, Take 20 mg by mouth every morning before breakfast, Disp: , Rfl:     FLUoxetine (PROzac) 20 mg capsule, TAKE 1 CAPSULE BY MOUTH EVERY DAY, Disp: 90 capsule, Rfl: 3    losartan (COZAAR) 25 mg tablet, Take 1 tablet (25 mg total) by mouth daily, Disp: 30 tablet, Rfl: 0    multivitamin (THERAGRAN) TABS, Take 1 tablet by mouth daily, Disp: , Rfl:     pravastatin (PRAVACHOL) 40 mg tablet, Take 1 tablet (40 mg total) by mouth daily with dinner, Disp: 30 tablet, Rfl: 0    tamsulosin (FLOMAX) 0 4 mg, Take 1 capsule (0 4 mg total) by mouth in the morning, Disp: 30 capsule, Rfl: 5    Past Medical History:   Diagnosis Date    Arthritis 2012    Depression 2012    Hyperlipidemia     Hypertension        Family History   Problem Relation Age of Onset    Cancer Father         Brain tumor    Heart disease Father     Hyperlipidemia Father     Hypertension Father     Brain cancer Father     Migraines Mother     Cancer Brother         Melanoma       Past Surgical History:   Procedure Laterality Date    ABDOMINAL SURGERY      CARDIAC CATHETERIZATION N/A 9/23/2022    Procedure: Cardiac catheterization;  Surgeon: Merlyn Sandoval MD;  Location: [address] [place] [address] CATH LAB; Service: Cardiology    CARDIAC CATHETERIZATION N/A 9/23/2022    Procedure: Cardiac Coronary Angiogram;  Surgeon: Merlyn Sandoval MD;  Location: [address] [place] [address] CATH LAB; Service: Cardiology    CARDIAC CATHETERIZATION Left 9/23/2022    Procedure: Cardiac Left Heart Cath;  Surgeon: Merlyn Sandoval MD;  Location: [facility] CARDI[facility] C[facility]; Service: Cardiology    CARDIAC CATHETERIZATION N/A 9/23/2022    Procedure: Cardiac pci;  Surgeon: Merlyn Sandoval MD;  Location: [address] [place] [address] CATH LAB;   Service: Cardiology    KNEE ARTHROSCOPY      NASAL SINUS SURGERY      VT ARTHROSCOPY SHOULDER SURGICAL BICEPS TENODESIS Right 1/25/2017    Procedure: ARTHROSCOPIC BICEPS TENODESIS;  Surgeon: Stan [place] MD;  Location: [facility];  Service: Orthopedics    VT SHLDR ARTHROSCOP,SURG,W/ROTAT CUFF REPR Right 1/25/2017    Procedure: SHOULDER ARTHROSCOPY ROTATOR CUFF REPAIR ;  Surgeon: Stan [place] MD;  Location: [facility];  Service: Orthopedics    VT SURGICAL ARTHROSCOPY Gunjan Plate DBRDMT 3+ Right 1/25/2017    Procedure: ARTHROSCOPY SHOULDER;  Surgeon: Stan [place] MD;  [facility];  Service: Orthopedics    TONSILLECTOMY         Social History     Socioeconomic History    Marital status: /Civil Union     Spouse name: Not on file    Number of children: Not on file    Years of education: Not on file    Highest education level: Not on file   Occupational History    Occupation: Part time   Tobacco Use    Smoking status: Former Smoker     Packs/day: 0 25     Years: 30 00     Pack years: 7 50     Types: Cigarettes     Start date: 5     Quit date:      Years since quittin 7    Smokeless tobacco: Never Used   Vaping Use    Vaping Use: Never used   Substance and Sexual Activity    Alcohol use: Not Currently     Alcohol/week: 0 0 standard drinks     Comment: social    Drug use: No    Sexual activity: Yes     Partners: Female     Birth control/protection: Condom Male     Comment: Denied high risk sexual behavior   Other Topics Concern    Not on file   Social History Narrative    Not on file     Social Determinants of Health     Financial Resource Strain: Not on file   Food Insecurity: Not on file   Transportation Needs: Not on file   Physical Activity: Not on file   Stress: Not on file   Social Connections: Not on file   Intimate Partner Violence: Not on file   Housing Stability: Not on file       Review of symptoms:   Constitution:  Negative  HEENT:  Negative  Cardiovascular:  Negative  Respiratory:  Negative  Skin:  Negative  Gastrointestinal:  Negative  Genitourinary:  Negative  Musculoskeletal:  Negative  Neurological:  Negative  Endocrine:  Negative  Psychological:  Negative    Vitals: There were no vitals taken for this visit  Physical Exam:     GEN: Alert and oriented x 3, in no acute distress  Well appearing and well nourished  HEENT: Sclera anicteric, conjunctivae pink, mucous membranes moist    NECK: Supple, no carotid bruits, no significant JVD  Trachea midline  HEART: Regular rhythm, normal S1 and S2, no murmurs, clicks, gallops or rubs  LUNGS: Clear to auscultation bilaterally; no wheezes, rales, or rhonchi  No increased work of breathing or signs of respiratory distress     ABDOMEN: Soft, nontender, nondistended, normoactive bowel sounds  EXTREMITIES: Skin warm and well perfused, no clubbing, cyanosis, +1 bilateral ankle edema  NEURO: No focal findings  Normal speech  Mood and affect normal    SKIN: Normal without suspicious lesions on exposed skin

## 2022-09-29 ENCOUNTER — OFFICE VISIT (OUTPATIENT)
Dept: INTERNAL MEDICINE CLINIC | Facility: CLINIC | Age: 72
End: 2022-09-29
Payer: COMMERCIAL

## 2022-09-29 VITALS
RESPIRATION RATE: 16 BRPM | HEART RATE: 61 BPM | SYSTOLIC BLOOD PRESSURE: 114 MMHG | HEIGHT: 72 IN | OXYGEN SATURATION: 100 % | TEMPERATURE: 98.2 F | WEIGHT: 216.6 LBS | BODY MASS INDEX: 29.34 KG/M2 | DIASTOLIC BLOOD PRESSURE: 70 MMHG

## 2022-09-29 DIAGNOSIS — I10 BENIGN HYPERTENSION: ICD-10-CM

## 2022-09-29 DIAGNOSIS — I25.10 CORONARY ARTERY DISEASE INVOLVING NATIVE CORONARY ARTERY OF NATIVE HEART WITHOUT ANGINA PECTORIS: Primary | ICD-10-CM

## 2022-09-29 DIAGNOSIS — Z95.5 STATUS POST INSERTION OF DRUG-ELUTING STENT INTO LEFT ANTERIOR DESCENDING (LAD) ARTERY: ICD-10-CM

## 2022-09-29 LAB
CHEST PAIN STATEMENT: NORMAL
MAX DIASTOLIC BP: 76 MMHG
MAX HEART RATE: 123 BPM
MAX PREDICTED HEART RATE: 150 BPM
MAX. SYSTOLIC BP: 184 MMHG
PROTOCOL NAME: NORMAL
REASON FOR TERMINATION: NORMAL
TARGET HR FORMULA: NORMAL
TIME IN EXERCISE PHASE: NORMAL

## 2022-09-29 PROCEDURE — 1111F DSCHRG MED/CURRENT MED MERGE: CPT | Performed by: PHYSICIAN ASSISTANT

## 2022-09-29 PROCEDURE — 99496 TRANSJ CARE MGMT HIGH F2F 7D: CPT | Performed by: PHYSICIAN ASSISTANT

## 2022-09-29 NOTE — PATIENT INSTRUCTIONS
Continue current medical management  Follow-up as scheduled  Follow-up for any symptoms of chest pain, palpitations, shortness of breath, swelling of legs

## 2022-09-29 NOTE — PROGRESS NOTES
Assessment/Plan:   Patient Instructions   Continue current medical management  Follow-up as scheduled  Follow-up for any symptoms of chest pain, palpitations, shortness of breath, swelling of legs  Quality Measures:       Return for Next scheduled follow up  Diagnoses and all orders for this visit:    Coronary artery disease involving native coronary artery of native heart without angina pectoris    Status post insertion of drug-eluting stent into left anterior descending (LAD) artery    Benign hypertension        Subjective:      Patient ID: Chalo Mahmood is a 81 y o  male  Hospital follow-up/ transition of care     Patient was initially seen on 09/21/2022 for precordial chest pain suspicious of coronary artery disease  Arrangements were made for cardiac stress testing on 09/22/2022  Stress testing was positive, he was referred to the ER for admission, underwent cardiac catheterization finding and 85% ostial stenosis of the LAD  A FRANCISCO J stent was inserted, the patient was discharged on Plavix and aspirin and has been doing well other than some heartburn type pain  He has restarted his PPI and is feeling better  History of hypertension that is well controlled  Previous edema which prompted him to present to the office has significantly improved  No complaint of chest pain, palpitations, shortness of breath, edema, lightheadedness  TCM Call     Date and time call was made  9/26/2022  9:00 AM    Hospital care reviewed  Records reviewed    Patient was hospitial[facility] at  [facility]    Date of Admission  09/23/22    Date of discharge  09/23/22    Disposition  Home    Were the patients medications reviewed and updated  Yes    Current Symptoms  None      TCM Call     Post hospital issues  None    Should patient be enrolled in anticoag monitoring? No    Scheduled for follow up?   Yes    Did you obtain your prescribed medications  Yes    Do you need help managing your prescriptions or medications  No    Is transportation to your appointment needed  No    I have advised the patient to call PCP with any new or worsening symptoms    hesham sainz A    Living Arrangements  Family members    Support System  None    Are you recieving any outpatient services  No    Are you recieving home care services  No    Are you using any community resources  No    Current waiver services  No    Have you fallen in the last 12 months  No    Interperter language line needed  No    Counseling  Patient            ALLERGIES:  Allergies   Allergen Reactions    Amlodipine Swelling    Lisinopril      Other reaction(s): Cough  Other reaction(s): Cough       CURRENT MEDICATIONS:    Current Outpatient Medications:     aspirin 81 MG tablet, Take 81 mg by mouth daily, Disp: , Rfl:     atenolol (TENORMIN) 50 mg tablet, TAKE 1 TABLET DAILY, Disp: 90 tablet, Rfl: 3    clopidogrel (PLAVIX) 75 mg tablet, Take 1 tablet (75 mg total) by mouth daily, Disp: 60 tablet, Rfl: 0    esomeprazole (NexIUM) 20 mg capsule, Take 20 mg by mouth every morning before breakfast, Disp: , Rfl:     FLUoxetine (PROzac) 20 mg capsule, TAKE 1 CAPSULE BY MOUTH EVERY DAY, Disp: 90 capsule, Rfl: 3    losartan (COZAAR) 25 mg tablet, Take 1 tablet (25 mg total) by mouth daily, Disp: 30 tablet, Rfl: 0    multivitamin (THERAGRAN) TABS, Take 1 tablet by mouth daily, Disp: , Rfl:     pravastatin (PRAVACHOL) 40 mg tablet, Take 1 tablet (40 mg total) by mouth daily with dinner, Disp: 30 tablet, Rfl: 0    tamsulosin (FLOMAX) 0 4 mg, Take 1 capsule (0 4 mg total) by mouth in the morning, Disp: 30 capsule, Rfl: 5    Cholecalciferol (VITAMIN D3) 2000 UNITS capsule, Take by mouth (Patient not taking: No sig reported), Disp: , Rfl:     ACTIVE PROBLEM LIST:  Patient Active Problem List   Diagnosis    Complete tear of left rotator cuff    Biceps tendinitis    Benign hypertension    Allergic rhinitis    Hyperlipidemia    Depression    Impaired fasting glucose    Vitamin D deficiency    Bilateral primary osteoarthritis of knee    Primary osteoarthritis of right knee    Primary osteoarthritis of left knee    Chronic venous insufficiency    Stable angina (HCC)    Coronary artery disease involving native coronary artery of native heart without angina pectoris    Status post insertion of drug-eluting stent into left anterior descending (LAD) artery       PAST MEDICAL HISTORY:  Past Medical History:   Diagnosis Date    Arthritis 2012    Depression 2012    Hyperlipidemia     Hypertension        PAST SURGICAL HISTORY:  Past Surgical History:   Procedure Laterality Date    ABDOMINAL SURGERY      CARDIAC CATHETERIZATION N/A 9/23/2022    Procedure: Cardiac catheterization;  Surgeon: Alicia Lindquist MD;  [facility]; Service: Cardiology    CARDIAC CATHETERIZATION N/A 9/23/2022    Procedure: Cardiac Coronary Angiogram;  Surgeon: Alicia Lindquist MD;  Location: [address] [place] [address] CATH LAB; Service: Cardiology    CARDIAC CATHETERIZATION Left 9/23/2022    Procedure: Cardiac Left Heart Cath;  Surgeon: Alicia Lindquist MD;  [facility]; Service: Cardiology    CARDIAC CATHETERIZATION N/A 9/23/2022    Procedure: Cardiac pci;  Surgeon: Alicia Lindquist MD;  Location: [address] [place] [address] CATH LAB;   Service: Cardiology    KNEE ARTHROSCOPY      NASAL SINUS SURGERY      KS ARTHROSCOPY SHOULDER SURGICAL BICEPS TENODESIS Right 1/25/2017    Procedure: ARTHROSCOPIC BICEPS TENODESIS;  Surgeon: Delores Layne MD;  Location: [facility] MAIN [facility];  Service: Orthopedics    KS SHLDR ARTHROSCOP,SURG,W/ROTAT CUFF REPR Right 1/25/2017    Procedure: SHOULDER ARTHROSCOPY ROTATOR CUFF REPAIR ;  Surgeon: Delores Layne MD;  Location: [facility];  Service: Orthopedics    KS SURGICAL ARTHROSCOPY [facility] DBRDMT 3+ Right 1/25/2017    Procedure: ARTHROSCOPY SHOULDER;  Surgeon: Delores Layne MD;  [facility]: [facility];  Service: Orthopedics    TONSILLECTOMY         FAMILY HISTORY:  Family History   Problem Relation Age of Onset    Cancer Father         Brain tumor    Heart disease Father     Hyperlipidemia Father     Hypertension Father     Brain cancer Father     Migraines Mother     Cancer Brother         Melanoma       SOCIAL HISTORY:  Social History     Socioeconomic History    Marital status: /Civil Union     Spouse name: Not on file    Number of children: Not on file    Years of education: Not on file    Highest education level: Not on file   Occupational History    Occupation: Part time   Tobacco Use    Smoking status: Former Smoker     Packs/day: 0 25     Years: 30 00     Pack years: 7 50     Types: Cigarettes     Start date:      Quit date:      Years since quittin 7    Smokeless tobacco: Never Used   Vaping Use    Vaping Use: Never used   Substance and Sexual Activity    Alcohol use: Not Currently     Alcohol/week: 0 0 standard drinks     Comment: social    Drug use: No    Sexual activity: Yes     Partners: Female     Birth control/protection: Condom Male     Comment: Denied high risk sexual behavior   Other Topics Concern    Not on file   Social History Narrative    Not on file     Social Determinants of Health     Financial Resource Strain: Not on file   Food Insecurity: Not on file   Transportation Needs: Not on file   Physical Activity: Not on file   Stress: Not on file   Social Connections: Not on file   Intimate Partner Violence: Not on file   Housing Stability: Not on file       Review of Systems   Constitutional: Negative for activity change, chills, fatigue and fever  HENT: Negative for congestion  Eyes: Negative for discharge  Respiratory: Negative for cough, chest tightness and shortness of breath  Cardiovascular: Negative for chest pain, palpitations and leg swelling  Gastrointestinal: Negative for abdominal pain  Endocrine: Negative for polydipsia, polyphagia and polyuria  Genitourinary: Negative for difficulty urinating  Musculoskeletal: Negative for arthralgias and myalgias  Skin: Negative for rash  Allergic/Immunologic: Negative for immunocompromised state  Neurological: Negative for dizziness, syncope, weakness, light-headedness and headaches  Hematological: Negative for adenopathy  Does not bruise/bleed easily  Psychiatric/Behavioral: Negative for dysphoric mood and sleep disturbance  The patient is not nervous/anxious  Objective:  Vitals:    09/29/22 1301   BP: 114/70   BP Location: Left arm   Patient Position: Sitting   Cuff Size: Large   Pulse: 61   Resp: 16   Temp: 98 2 °F (36 8 °C)   TempSrc: Tympanic   SpO2: 100%   Weight: 98 2 kg (216 lb 9 6 oz)   Height: 6' (1 829 m)     Body mass index is 29 38 kg/m²  Physical Exam  Vitals and nursing note reviewed  Constitutional:       General: He is not in acute distress  Appearance: He is well-developed  He is not ill-appearing  HENT:      Head: Normocephalic and atraumatic  Eyes:      Extraocular Movements: Extraocular movements intact  Pupils: Pupils are equal, round, and reactive to light  Neck:      Thyroid: No thyromegaly  Vascular: No carotid bruit or JVD  Cardiovascular:      Rate and Rhythm: Normal rate and regular rhythm  Heart sounds: Normal heart sounds  Pulmonary:      Effort: Pulmonary effort is normal  No respiratory distress  Breath sounds: Normal breath sounds  Musculoskeletal:      Cervical back: Neck supple  Right lower leg: Edema (  Trace pedal) present  Left lower leg: Edema ( trace pedal) present  Lymphadenopathy:      Cervical: No cervical adenopathy  Skin:     General: Skin is warm and dry  Findings: No rash  Neurological:      General: No focal deficit present  Mental Status: He is alert and oriented to person, place, and time  Mental status is at baseline     Psychiatric:         Mood and Affect: Mood normal  Behavior: Behavior normal            RESULTS:    Recent Results (from the past 1008 hour(s))   ECG 12 lead    Collection Time: 08/25/22 12:16 PM   Result Value Ref Range    Ventricular Rate 56 BPM    Atrial Rate 56 BPM    OR Interval 216 ms    QRSD Interval 86 ms    QT Interval 388 ms    QTC Interval 374 ms    P [place] -13 degrees    QRS Axis 3 degrees    T Wave Axis 26 degrees   Protime-INR    Collection Time: 09/07/22 12:04 PM   Result Value Ref Range    Protime 12 4 11 6 - 14 5 seconds    INR 0 94 0 84 - 1 19   APTT    Collection Time: 09/07/22 12:04 PM   Result Value Ref Range    PTT 27 23 - 37 seconds   CBC and differential    Collection Time: 09/07/22 12:04 PM   Result Value Ref Range    WBC 7 45 4 31 - 10 16 Thousand/uL    RBC 5 14 3 88 - 5 62 Million/uL    Hemoglobin 15 4 12 0 - 17 0 g/dL    Hematocrit 45 2 36 5 - 49 3 %    MCV 88 82 - 98 fL    MCH 30 0 26 8 - 34 3 pg    MCHC 34 1 31 4 - 37 4 g/dL    RDW 15 3 (H) 11 6 - 15 1 %    MPV 10 2 8 9 - 12 7 fL    Platelets 055 430 - 575 Thousands/uL    nRBC 0 /100 WBCs    Neutrophils Relative 63 43 - 75 %    Immat GRANS % 0 0 - 2 %    Lymphocytes Relative 23 14 - 44 %    Monocytes Relative 10 4 - 12 %    Eosinophils Relative 3 0 - 6 %    Basophils Relative 1 0 - 1 %    Neutrophils Absolute 4 67 1 85 - 7 62 Thousands/µL    Immature Grans Absolute 0 02 0 00 - 0 20 Thousand/uL    Lymphocytes Absolute 1 73 0 60 - 4 47 Thousands/µL    Monocytes Absolute 0 72 0 17 - 1 22 Thousand/µL    Eosinophils Absolute 0 22 0 00 - 0 61 Thousand/µL    Basophils Absolute 0 09 0 00 - 0 10 Thousands/µL   Comprehensive metabolic panel    Collection Time: 09/20/22  7:18 AM   Result Value Ref Range    Sodium 139 135 - 147 mmol/L    Potassium 4 4 3 5 - 5 3 mmol/L    Chloride 104 96 - 108 mmol/L    CO2 30 21 - 32 mmol/L    ANION GAP 5 4 - 13 mmol/L    BUN 25 5 - 25 mg/dL    Creatinine 1 23 0 60 - 1 30 mg/dL    Glucose, Fasting 112 (H) 65 - 99 mg/dL    Calcium 9 1 8 3 - 10 1 mg/dL    AST 19 5 - 45 U/L    ALT 36 12 - 78 U/L    Alkaline Phosphatase 49 46 - 116 U/L    Total Protein 6 6 6 4 - 8 4 g/dL    Albumin 3 5 3 5 - 5 0 g/dL    Total Bilirubin 0 94 0 20 - 1 00 mg/dL    eGFR 59 ml/min/1 73sq m   CBC and differential    Collection Time: 09/20/22  7:18 AM   Result Value Ref Range    WBC 7 87 4 31 - 10 16 Thousand/uL    RBC 4 88 3 88 - 5 62 Million/uL    Hemoglobin 15 3 12 0 - 17 0 g/dL    Hematocrit 43 5 36 5 - 49 3 %    MCV 89 82 - 98 fL    MCH 31 4 26 8 - 34 3 pg    MCHC 35 2 31 4 - 37 4 g/dL    RDW 15 9 (H) 11 6 - 15 1 %    MPV 10 8 8 9 - 12 7 fL    Platelets 870 716 - 538 Thousands/uL    nRBC 0 /100 WBCs    Neutrophils Relative 55 43 - 75 %    Immat GRANS % 0 0 - 2 %    Lymphocytes Relative 30 14 - 44 %    Monocytes Relative 11 4 - 12 %    Eosinophils Relative 3 0 - 6 %    Basophils Relative 1 0 - 1 %    Neutrophils Absolute 4 28 1 85 - 7 62 Thousands/µL    Immature Grans Absolute 0 02 0 00 - 0 20 Thousand/uL    Lymphocytes Absolute 2 37 0 60 - 4 47 Thousands/µL    Monocytes Absolute 0 83 0 17 - 1 22 Thousand/µL    Eosinophils Absolute 0 27 0 00 - 0 61 Thousand/µL    Basophils Absolute 0 10 0 00 - 0 10 Thousands/µL   Hemoglobin A1C    Collection Time: 09/20/22  7:18 AM   Result Value Ref Range    Hemoglobin A1C 6 2 (H) Normal 3 8-5 6%; PreDiabetic 5 7-6 4%; Diabetic >=6 5%; Glycemic control for adults with diabetes <7 0% %     mg/dl   Lipid panel    Collection Time: 09/20/22  7:18 AM   Result Value Ref Range    Cholesterol 180 See Comment mg/dL    Triglycerides 192 (H) See Comment mg/dL    HDL, Direct 42 >=40 mg/dL    LDL Calculated 100 0 - 100 mg/dL    Non-HDL-Chol (CHOL-HDL) 138 mg/dl   Stress strip    Collection Time: 09/22/22  1:04 PM   Result Value Ref Range    Protocol Name ALAN     Time In Exercise Phase 00:06:00     MAX   SYSTOLIC  mmHg    Max Diastolic Bp 76 mmHg    Max Heart Rate 123 BPM    Max Predicted Heart Rate 150 BPM    Reason for Termination Pathological ST depression Test Indication Abnormal ECG  Precordial Pain  Pre-Op Evaluation       Target Hr Formular (220 - Age)*85%     Arrhy During Ex      ECG Interp Before Ex      ECG Interp during Ex      Ex Summary Comment      Chest Pain Statement non-limiting     Overall Hr Response To Exercise      Overall BP Response To Exercise     Stress strip    Collection Time: 09/22/22  1:04 PM   Result Value Ref Range    Protocol Name ALAN     Time In Exercise Phase 00:06:00     MAX   SYSTOLIC  mmHg    Max Diastolic Bp 76 mmHg    Max Heart Rate 123 BPM    Max Predicted Heart Rate 150 BPM    Reason for Termination Pathological ST depression     Test Indication Abnormal ECG  Precordial Pain  Pre-Op Evaluation       Target Hr Formular (220 - Age)*85%     Arrhy During Ex      ECG Interp Before Ex      ECG Interp during Ex      Ex Summary Comment      Chest Pain Statement non-limiting     Overall Hr Response To Exercise      Overall BP Response To Exercise     Echo complete w/ contrast if indicated    Collection Time: 09/22/22  1:59 PM   Result Value Ref Range    LA size 4 4 cm    Triscuspid Valve Regurgitation Peak Gradient 39 0 mmHg    Tricuspid valve peak regurgitation velocity 3 11 m/s    LVPWd 1 10 cm    Left Atrium Area-systolic Apical Two Chamber 21 cm2    Left Atrium Area-systolic Four Chamber 88 4 cm2    MV E' Tissue Velocity Septal 9 cm/s    RA 2D Volume 61 0 mL    TR Peak Edgar 3 1 m/s    IVSd 4 05 cm    LV DIASTOLIC VOLUME (MOD BIPLANE) 2D 112 mL    LEFT VENTRICLE SYSTOLIC VOLUME (MOD BIPLANE) 2D 37 mL    Left ventricular stroke volume (2D) 76 00 mL    A4C EF 57 %    LA length (A2C) 5 10 cm    LVIDd 4 90 cm    IVS 1 1 cm    LVIDS 3 10 cm    FS 37 28 - 44 %    Asc Ao 3 1 cm    Ao root 3 70 cm    RVID d 4 1 cm    AV LVOT peak gradient 2 mmHg    MV valve area p 1/2 method 4 00 cm2    E wave deceleration time 191 ms    AV peak gradient 4 mmHg    MV Peak E Edgar 66 cm/s    MV Peak A Edgar 0 78 m/s    CATY A4C 15 2 cm2    RAA A4C 20 2 cm2 MV stenosis pressure 1/2 time 55 ms    LVSV, 2D 76 mL   NM myocardial perfusion spect (stress and/or rest)    Collection Time: 09/22/22  2:26 PM   Result Value Ref Range    Rest Nuclear Isotope Dose 10 73 mCi    Stress Nuclear Isotope Dose 31 10 mCi    Baseline HR 50 bpm    Baseline /72 mmHg    O2 sat rest 97 %    Stress peak  bpm    Post peak  mmHg    Rate Pressure Product 22,264 0     O2 sat peak 97 %    Recovery HR 61 bpm    Recovery /86 mmHg    O2 sat recovery 98 %    Max  bpm    Max HR Percent 81 %    Exercise duration (min) 6 min    Estimated workload 7 0 METS    Angina Index 1     Stress Stage Reached 2 0     Stress/rest perfusion ratio 4 97     End diastolic index (mL/m2) 90 8 mL/m2    EF (%) 68 %    End systolic index (mL/m2) 96 2 mL/m2   ECG 12 lead    Collection Time: 09/22/22  4:36 PM   Result Value Ref Range    Ventricular Rate 67 BPM    Atrial Rate 67 BPM    NH Interval 196 ms    QRSD Interval 88 ms    QT Interval 382 ms    QTC Interval 403 ms    P [place] 37 degrees    QRS Axis 40 degrees    T Wave Axis 25 degrees   CBC and differential    Collection Time: 09/22/22  5:18 PM   Result Value Ref Range    WBC 9 08 4 31 - 10 16 Thousand/uL    RBC 5 07 3 88 - 5 62 Million/uL    Hemoglobin 15 4 12 0 - 17 0 g/dL    Hematocrit 45 2 36 5 - 49 3 %    MCV 89 82 - 98 fL    MCH 30 4 26 8 - 34 3 pg    MCHC 34 1 31 4 - 37 4 g/dL    RDW 16 0 (H) 11 6 - 15 1 %    MPV 10 4 8 9 - 12 7 fL    Platelets 339 825 - 881 Thousands/uL    nRBC 0 /100 WBCs    Neutrophils Relative 60 43 - 75 %    Immat GRANS % 0 0 - 2 %    Lymphocytes Relative 26 14 - 44 %    Monocytes Relative 9 4 - 12 %    Eosinophils Relative 4 0 - 6 %    Basophils Relative 1 0 - 1 %    Neutrophils Absolute 5 37 1 85 - 7 62 Thousands/µL    Immature Grans Absolute 0 03 0 00 - 0 20 Thousand/uL    Lymphocytes Absolute 2 35 0 60 - 4 47 Thousands/µL    Monocytes Absolute 0 83 0 17 - 1 22 Thousand/µL    Eosinophils Absolute 0 39 0 00 - 0 61 Thousand/µL    Basophils Absolute 0 11 (H) 0 00 - 0 10 Thousands/µL   Basic metabolic panel    Collection Time: 09/22/22  5:18 PM   Result Value Ref Range    Sodium 140 135 - 147 mmol/L    Potassium 4 9 3 5 - 5 3 mmol/L    Chloride 104 96 - 108 mmol/L    CO2 26 21 - 32 mmol/L    ANION GAP 10 4 - 13 mmol/L    BUN 22 5 - 25 mg/dL    Creatinine 0 99 0 60 - 1 30 mg/dL    Glucose 111 65 - 140 mg/dL    Calcium 9 2 8 3 - 10 1 mg/dL    eGFR 76 ml/min/1 73sq m   HS Troponin 0hr (reflex protocol)    Collection Time: 09/22/22  5:18 PM   Result Value Ref Range    hs TnI 0hr 18 "Refer to ACS Flowchart"- see link ng/L   NT-BNP PRO    Collection Time: 09/22/22  5:18 PM   Result Value Ref Range    NT-proBNP 54 <125 pg/mL   Protime-INR    Collection Time: 09/22/22  5:18 PM   Result Value Ref Range    Protime 13 0 11 6 - 14 5 seconds    INR 1 00 0 84 - 1 19   APTT    Collection Time: 09/22/22  5:18 PM   Result Value Ref Range    PTT 28 23 - 37 seconds   HS Troponin I 2hr    Collection Time: 09/22/22  7:08 PM   Result Value Ref Range    hs TnI 2hr 22 "Refer to ACS Flowchart"- see link ng/L    Delta 2hr hsTnI 4 <20 ng/L   HS Troponin I 4hr    Collection Time: 09/22/22  9:54 PM   Result Value Ref Range    hs TnI 4hr 12 "Refer to ACS Flowchart"- see link ng/L    Delta 4hr hsTnI -6 <20 ng/L   Basic metabolic panel    Collection Time: 09/23/22  9:06 AM   Result Value Ref Range    Sodium 138 135 - 147 mmol/L    Potassium 4 4 3 5 - 5 3 mmol/L    Chloride 103 96 - 108 mmol/L    CO2 30 21 - 32 mmol/L    ANION GAP 5 4 - 13 mmol/L    BUN 23 5 - 25 mg/dL    Creatinine 1 21 0 60 - 1 30 mg/dL    Glucose 112 65 - 140 mg/dL    Glucose, Fasting 112 (H) 65 - 99 mg/dL    Calcium 9 3 8 3 - 10 1 mg/dL    eGFR 60 ml/min/1 73sq m   POCT activated clotting time    Collection Time: 09/23/22 11:14 AM   Result Value Ref Range    Activated Clotting Time, i-STAT 212 (H) 89 - 137 sec    Specimen Type VENOUS    POCT activated clotting time    Collection Time: 09/23/22 11:27 AM   Result Value Ref Range    Activated Clotting Time, i-STAT 218 (H) 89 - 137 sec    Specimen Type VENOUS        This note was created with voice recognition software  Phonic, grammatical and spelling errors may be present within the note as a result

## 2022-10-11 ENCOUNTER — TELEPHONE (OUTPATIENT)
Dept: PULMONOLOGY | Facility: CLINIC | Age: 72
End: 2022-10-11

## 2022-10-11 NOTE — TELEPHONE ENCOUNTER
Left message for pt to call office to schedule appt for fu ct scan after 1/13/23 with dr Paul Almanzar

## 2022-10-14 ENCOUNTER — OFFICE VISIT (OUTPATIENT)
Dept: INTERNAL MEDICINE CLINIC | Facility: CLINIC | Age: 72
End: 2022-10-14
Payer: COMMERCIAL

## 2022-10-14 VITALS
TEMPERATURE: 97.9 F | HEART RATE: 64 BPM | OXYGEN SATURATION: 96 % | RESPIRATION RATE: 18 BRPM | SYSTOLIC BLOOD PRESSURE: 146 MMHG | DIASTOLIC BLOOD PRESSURE: 78 MMHG | BODY MASS INDEX: 29.53 KG/M2 | WEIGHT: 218 LBS | HEIGHT: 72 IN

## 2022-10-14 DIAGNOSIS — I10 BENIGN HYPERTENSION: ICD-10-CM

## 2022-10-14 DIAGNOSIS — I20.8 STABLE ANGINA (HCC): ICD-10-CM

## 2022-10-14 DIAGNOSIS — I25.10 CORONARY ARTERY DISEASE INVOLVING NATIVE CORONARY ARTERY OF NATIVE HEART WITHOUT ANGINA PECTORIS: Primary | ICD-10-CM

## 2022-10-14 DIAGNOSIS — Z23 FLU VACCINE NEED: ICD-10-CM

## 2022-10-14 PROCEDURE — 90662 IIV NO PRSV INCREASED AG IM: CPT | Performed by: INTERNAL MEDICINE

## 2022-10-14 PROCEDURE — 99214 OFFICE O/P EST MOD 30 MIN: CPT | Performed by: INTERNAL MEDICINE

## 2022-10-14 PROCEDURE — 90471 IMMUNIZATION ADMIN: CPT | Performed by: INTERNAL MEDICINE

## 2022-10-14 RX ORDER — CLOPIDOGREL BISULFATE 75 MG/1
75 TABLET ORAL DAILY
Qty: 90 TABLET | Refills: 1 | Status: SHIPPED | OUTPATIENT
Start: 2022-10-14

## 2022-10-14 RX ORDER — LOSARTAN POTASSIUM 25 MG/1
25 TABLET ORAL DAILY
Qty: 90 TABLET | Refills: 1 | Status: SHIPPED | OUTPATIENT
Start: 2022-10-14

## 2022-10-14 RX ORDER — PRAVASTATIN SODIUM 40 MG
40 TABLET ORAL
Qty: 90 TABLET | Refills: 1 | Status: SHIPPED | OUTPATIENT
Start: 2022-10-14

## 2022-10-14 NOTE — PROGRESS NOTES
Assessment/Plan:       Appears to be stable at this point  Continue current medications  He is going to start checking his pressure at home and if not controlled, he will let us know  Continue diet and exercise  No other changes at this point  Follow-up with Cardiology  Quality Measures:       Return in about 4 weeks (around 11/11/2022) for Recheck  No problem-specific Assessment & Plan notes found for this encounter  Diagnoses and all orders for this visit:    Coronary artery disease involving native coronary artery of native heart without angina pectoris    Stable angina (HCC)  -     clopidogrel (PLAVIX) 75 mg tablet; Take 1 tablet (75 mg total) by mouth daily  -     losartan (COZAAR) 25 mg tablet; Take 1 tablet (25 mg total) by mouth daily  -     pravastatin (PRAVACHOL) 40 mg tablet; Take 1 tablet (40 mg total) by mouth daily with dinner    Benign hypertension    Flu vaccine need  -     influenza vaccine, high-dose, PF 0 7 mL (FLUZONE HIGH-DOSE)        Subjective:      Patient ID: Ole Handy is a 81 y o  male  Patient comes in today for follow-up  He is doing well after his stent placement  Tolerating the new medicines  Blood pressure is running a little high today  He is taking the meds as directed  He had not been checking his pressure at home  Feels okay otherwise  Cholesterol needs to be a little lower but his medication was changed  Heart disease is stable now  Just needs to know when he can reschedule his shoulder surgery        ALLERGIES:  Allergies   Allergen Reactions   • Amlodipine Swelling   • Lisinopril      Other reaction(s): Cough  Other reaction(s): Cough       CURRENT MEDICATIONS:    Current Outpatient Medications:   •  aspirin 81 MG tablet, Take 81 mg by mouth daily, Disp: , Rfl:   •  atenolol (TENORMIN) 50 mg tablet, TAKE 1 TABLET DAILY, Disp: 90 tablet, Rfl: 3  •  clopidogrel (PLAVIX) 75 mg tablet, Take 1 tablet (75 mg total) by mouth daily, Disp: 90 tablet, Rfl: 1  •  esomeprazole (NexIUM) 20 mg capsule, Take 20 mg by mouth every morning before breakfast, Disp: , Rfl:   •  FLUoxetine (PROzac) 20 mg capsule, TAKE 1 CAPSULE BY MOUTH EVERY DAY, Disp: 90 capsule, Rfl: 3  •  losartan (COZAAR) 25 mg tablet, Take 1 tablet (25 mg total) by mouth daily, Disp: 90 tablet, Rfl: 1  •  multivitamin (THERAGRAN) TABS, Take 1 tablet by mouth daily, Disp: , Rfl:   •  pravastatin (PRAVACHOL) 40 mg tablet, Take 1 tablet (40 mg total) by mouth daily with dinner, Disp: 90 tablet, Rfl: 1  •  tamsulosin (FLOMAX) 0 4 mg, Take 1 capsule (0 4 mg total) by mouth in the morning, Disp: 30 capsule, Rfl: 5  •  Cholecalciferol (VITAMIN D3) 2000 UNITS capsule, Take by mouth (Patient not taking: No sig reported), Disp: , Rfl:     ACTIVE PROBLEM LIST:  Patient Active Problem List   Diagnosis   • Complete tear of left rotator cuff   • Biceps tendinitis   • Benign hypertension   • Allergic rhinitis   • Hyperlipidemia   • Depression   • Impaired fasting glucose   • Vitamin D deficiency   • Bilateral primary osteoarthritis of knee   • Primary osteoarthritis of right knee   • Primary osteoarthritis of left knee   • Chronic venous insufficiency   • Stable angina (HCC)   • Coronary artery disease involving native coronary artery of native heart without angina pectoris   • Status post insertion of drug-eluting stent into left anterior descending (LAD) artery       PAST MEDICAL HISTORY:  Past Medical History:   Diagnosis Date   • Arthritis 2012   • Depression 2012   • Hyperlipidemia    • Hypertension        PAST SURGICAL HISTORY:  Past Surgical History:   Procedure Laterality Date   • ABDOMINAL SURGERY     • CARDIAC CATHETERIZATION N/A 9/23/2022    Procedure: Cardiac catheterization;  Surgeon: Ahmet Stoddard MD;  Location: [facility];   Service: Cardiology   • CARDIAC CATHETERIZATION N/A 9/23/2022    Procedure: Cardiac Coronary Angiogram;  Surgeon: Ahmet Stoddard MD;  Location: [address] [place] [address] CATH LAB; Service: Cardiology   • CARDIAC CATHETERIZATION Left 2022    Procedure: Cardiac Left Heart Cath;  Surgeon: Shawna Manriquez MD;  Location: [facility]; Service: Cardiology   • CARDIAC CATHETERIZATION N/A 2022    Procedure: Cardiac pci;  Surgeon: Shawna Manriquez MD;  Location: [address] CATH LAB;   Service: Cardiology   • KNEE ARTHROSCOPY     • NASAL SINUS SURGERY     • AL ARTHROSCOPY SHOULDER SURGICAL BICEPS TENODESIS Right 2017    Procedure: ARTHROSCOPIC BICEPS TENODESIS;  Surgeon: Angélica Hilliard MD;  Location: [facility];  Service: Orthopedics   • AL SHLDR ARTHROSCOP,SURG,W/ROTAT CUFF REPR Right 2017    Procedure: SHOULDER ARTHROSCOPY ROTATOR CUFF REPAIR ;  Surgeon: Angélica Hilliard MD;  Location: [facility];  Service: Orthopedics   • AL SURGICAL ARTHROSCOPY Belmiguel Arturo DBRDMT 3+ Right 2017    Procedure: ARTHROSCOPY SHOULDER;  Surgeon: Angélica Hilliard MD;  Location: [facility];  Service: Orthopedics   • TONSILLECTOMY         FAMILY HISTORY:  Family History   Problem Relation Age of Onset   • Cancer Father         Brain tumor   • Heart disease Father    • Hyperlipidemia Father    • Hypertension Father    • Brain cancer Father    • Migraines Mother    • Cancer Brother         Melanoma       SOCIAL HISTORY:  Social History     Socioeconomic History   • Marital status: /Civil Union     Spouse name: Not on file   • Number of children: Not on file   • Years of education: Not on file   • Highest education level: Not on file   Occupational History   • Occupation: Part time   Tobacco Use   • Smoking status: Former Smoker     Packs/day: 0 25     Years: 30 00     Pack years: 7 50     Types: Cigarettes     Start date:      Quit date:      Years since quittin 8   • Smokeless tobacco: Never Used   Vaping Use   • Vaping Use: Never used   Substance and Sexual Activity   • Alcohol use: Not Currently     Alcohol/week: 0 0 standard drinks     Comment: social   • Drug use: No   • Sexual activity: Yes     Partners: Female     Birth control/protection: Condom Male     Comment: Denied high risk sexual behavior   Other Topics Concern   • Not on file   Social History Narrative   • Not on file     Social Determinants of Health     Financial Resource Strain: Not on file   Food Insecurity: Not on file   Transportation Needs: Not on file   Physical Activity: Not on file   Stress: Not on file   Social Connections: Not on file   Intimate Partner Violence: Not on file   Housing Stability: Not on file       Review of Systems   Respiratory: Negative for shortness of breath  Cardiovascular: Negative for chest pain  Gastrointestinal: Negative for abdominal pain  Objective:  Vitals:    10/14/22 0819   BP: 146/78   BP Location: Left arm   Patient Position: Sitting   Cuff Size: Adult   Pulse: 64   Resp: 18   Temp: 97 9 °F (36 6 °C)   TempSrc: Tympanic   SpO2: 96%   Weight: 98 9 kg (218 lb)   Height: 6' (1 829 m)     Body mass index is 29 57 kg/m²  Physical Exam  Vitals and nursing note reviewed  Constitutional:       Appearance: He is well-developed  Cardiovascular:      Rate and Rhythm: Normal rate and regular rhythm  Heart sounds: Normal heart sounds  Pulmonary:      Effort: Pulmonary effort is normal       Breath sounds: Normal breath sounds  Abdominal:      Palpations: Abdomen is soft  Tenderness: There is no abdominal tenderness  Neurological:      Mental Status: He is alert and oriented to person, place, and time             RESULTS:    Recent Results (from the past 1008 hour(s))   Protime-INR    Collection Time: 09/07/22 12:04 PM   Result Value Ref Range    Protime 12 4 11 6 - 14 5 seconds    INR 0 94 0 84 - 1 19   APTT    Collection Time: 09/07/22 12:04 PM   Result Value Ref Range    PTT 27 23 - 37 seconds   CBC and differential    Collection Time: 09/07/22 12:04 PM   Result Value Ref Range    WBC 7 45 4 31 - 10 16 Thousand/uL    RBC 5 14 3 88 - 5 62 Million/uL    Hemoglobin 15 4 12 0 - 17 0 g/dL    Hematocrit 45 2 36 5 - 49 3 %    MCV 88 82 - 98 fL    MCH 30 0 26 8 - 34 3 pg    MCHC 34 1 31 4 - 37 4 g/dL    RDW 15 3 (H) 11 6 - 15 1 %    MPV 10 2 8 9 - 12 7 fL    Platelets 383 060 - 005 Thousands/uL    nRBC 0 /100 WBCs    Neutrophils Relative 63 43 - 75 %    Immat GRANS % 0 0 - 2 %    Lymphocytes Relative 23 14 - 44 %    Monocytes Relative 10 4 - 12 %    Eosinophils Relative 3 0 - 6 %    Basophils Relative 1 0 - 1 %    Neutrophils Absolute 4 67 1 85 - 7 62 Thousands/µL    Immature Grans Absolute 0 02 0 00 - 0 20 Thousand/uL    Lymphocytes Absolute 1 73 0 60 - 4 47 Thousands/µL    Monocytes Absolute 0 72 0 17 - 1 22 Thousand/µL    Eosinophils Absolute 0 22 0 00 - 0 61 Thousand/µL    Basophils Absolute 0 09 0 00 - 0 10 Thousands/µL   Comprehensive metabolic panel    Collection Time: 09/20/22  7:18 AM   Result Value Ref Range    Sodium 139 135 - 147 mmol/L    Potassium 4 4 3 5 - 5 3 mmol/L    Chloride 104 96 - 108 mmol/L    CO2 30 21 - 32 mmol/L    ANION GAP 5 4 - 13 mmol/L    BUN 25 5 - 25 mg/dL    Creatinine 1 23 0 60 - 1 30 mg/dL    Glucose, Fasting 112 (H) 65 - 99 mg/dL    Calcium 9 1 8 3 - 10 1 mg/dL    AST 19 5 - 45 U/L    ALT 36 12 - 78 U/L    Alkaline Phosphatase 49 46 - 116 U/L    Total Protein 6 6 6 4 - 8 4 g/dL    Albumin 3 5 3 5 - 5 0 g/dL    Total Bilirubin 0 94 0 20 - 1 00 mg/dL    eGFR 59 ml/min/1 73sq m   CBC and differential    Collection Time: 09/20/22  7:18 AM   Result Value Ref Range    WBC 7 87 4 31 - 10 16 Thousand/uL    RBC 4 88 3 88 - 5 62 Million/uL    Hemoglobin 15 3 12 0 - 17 0 g/dL    Hematocrit 43 5 36 5 - 49 3 %    MCV 89 82 - 98 fL    MCH 31 4 26 8 - 34 3 pg    MCHC 35 2 31 4 - 37 4 g/dL    RDW 15 9 (H) 11 6 - 15 1 %    MPV 10 8 8 9 - 12 7 fL    Platelets 508 494 - 094 Thousands/uL    nRBC 0 /100 WBCs    Neutrophils Relative 55 43 - 75 %    Immat GRANS % 0 0 - 2 %    Lymphocytes Relative 30 14 - 44 %    Monocytes Relative 11 4 - 12 % Eosinophils Relative 3 0 - 6 %    Basophils Relative 1 0 - 1 %    Neutrophils Absolute 4 28 1 85 - 7 62 Thousands/µL    Immature Grans Absolute 0 02 0 00 - 0 20 Thousand/uL    Lymphocytes Absolute 2 37 0 60 - 4 47 Thousands/µL    Monocytes Absolute 0 83 0 17 - 1 22 Thousand/µL    Eosinophils Absolute 0 27 0 00 - 0 61 Thousand/µL    Basophils Absolute 0 10 0 00 - 0 10 Thousands/µL   Hemoglobin A1C    Collection Time: 09/20/22  7:18 AM   Result Value Ref Range    Hemoglobin A1C 6 2 (H) Normal 3 8-5 6%; PreDiabetic 5 7-6 4%; Diabetic >=6 5%; Glycemic control for adults with diabetes <7 0% %     mg/dl   Lipid panel    Collection Time: 09/20/22  7:18 AM   Result Value Ref Range    Cholesterol 180 See Comment mg/dL    Triglycerides 192 (H) See Comment mg/dL    HDL, Direct 42 >=40 mg/dL    LDL Calculated 100 0 - 100 mg/dL    Non-HDL-Chol (CHOL-HDL) 138 mg/dl   Stress strip    Collection Time: 09/22/22  1:04 PM   Result Value Ref Range    Protocol Name ALAN     Time In Exercise Phase 00:06:00     MAX  SYSTOLIC  mmHg    Max Diastolic Bp 76 mmHg    Max Heart Rate 123 BPM    Max Predicted Heart Rate 150 BPM    Reason for Termination Pathological ST depression     Test Indication Abnormal ECG  Precordial Pain  Pre-Op Evaluation       Target Hr Formular (220 - Age)*85%     Arrhy During Ex      ECG Interp Before Ex      ECG Interp during Ex      Ex Summary Comment      Chest Pain Statement non-limiting     Overall Hr Response To Exercise      Overall BP Response To Exercise     Stress strip    Collection Time: 09/22/22  1:04 PM   Result Value Ref Range    Protocol Name ALAN     Time In Exercise Phase 00:06:00     MAX   SYSTOLIC  mmHg    Max Diastolic Bp 76 mmHg    Max Heart Rate 123 BPM    Max Predicted Heart Rate 150 BPM    Reason for Termination Pathological ST depression     Test Indication Abnormal ECG  Precordial Pain  Pre-Op Evaluation       Target Hr Formular (220 - Age)*85%     Arrhy During Ex ECG Interp Before Ex      ECG Interp during Ex      Ex Summary Comment      Chest Pain Statement non-limiting     Overall Hr Response To Exercise      Overall BP Response To Exercise     Echo complete w/ contrast if indicated    Collection Time: 09/22/22  1:59 PM   Result Value Ref Range    LA size 4 4 cm    Triscuspid Valve Regurgitation Peak Gradient 39 0 mmHg    Tricuspid valve peak regurgitation velocity 3 11 m/s    LVPWd 1 10 cm    Left Atrium Area-systolic Apical Two Chamber 21 cm2    Left Atrium Area-systolic Four Chamber 66 4 cm2    MV E' Tissue Velocity Septal 9 cm/s    RA 2D Volume 61 0 mL    TR Peak Edgar 3 1 m/s    IVSd 4 62 cm    LV DIASTOLIC VOLUME (MOD BIPLANE) 2D 112 mL    LEFT VENTRICLE SYSTOLIC VOLUME (MOD BIPLANE) 2D 37 mL    Left ventricular stroke volume (2D) 76 00 mL    A4C EF 57 %    LA length (A2C) 5 10 cm    LVIDd 4 90 cm    IVS 1 1 cm    LVIDS 3 10 cm    FS 37 28 - 44 %    Asc Ao 3 1 cm    Ao root 3 70 cm    RVID d 4 1 cm    AV LVOT peak gradient 2 mmHg    MV valve area p 1/2 method 4 00 cm2    E wave deceleration time 191 ms    AV peak gradient 4 mmHg    MV Peak E Edgar 66 cm/s    MV Peak A Edgar 0 78 m/s    CATY A4C 15 2 cm2    RAA A4C 20 2 cm2    MV stenosis pressure 1/2 time 55 ms    LVSV, 2D 76 mL   NM myocardial perfusion spect (stress and/or rest)    Collection Time: 09/22/22  2:26 PM   Result Value Ref Range    Rest Nuclear Isotope Dose 10 73 mCi    Stress Nuclear Isotope Dose 31 10 mCi    Baseline HR 50 bpm    Baseline /72 mmHg    O2 sat rest 97 %    Stress peak  bpm    Post peak  mmHg    Rate Pressure Product 22,264 0     O2 sat peak 97 %    Recovery HR 61 bpm    Recovery /86 mmHg    O2 sat recovery 98 %    Max  bpm    Max HR Percent 81 %    Exercise duration (min) 6 min    Estimated workload 7 0 METS    Angina Index 1     Stress Stage Reached 2 0     Stress/rest perfusion ratio 4 21     End diastolic index (mL/m2) 64 5 mL/m2    EF (%) 68 %    End systolic index (mL/m2) 29 0 mL/m2   ECG 12 lead    Collection Time: 09/22/22  4:36 PM   Result Value Ref Range    Ventricular Rate 67 BPM    Atrial Rate 67 BPM    VT Interval 196 ms    QRSD Interval 88 ms    QT Interval 382 ms    QTC Interval 403 ms    P [place] 37 degrees    QRS Axis 40 degrees    T Wave Axis 25 degrees   CBC and differential    Collection Time: 09/22/22  5:18 PM   Result Value Ref Range    WBC 9 08 4 31 - 10 16 Thousand/uL    RBC 5 07 3 88 - 5 62 Million/uL    Hemoglobin 15 4 12 0 - 17 0 g/dL    Hematocrit 45 2 36 5 - 49 3 %    MCV 89 82 - 98 fL    MCH 30 4 26 8 - 34 3 pg    MCHC 34 1 31 4 - 37 4 g/dL    RDW 16 0 (H) 11 6 - 15 1 %    MPV 10 4 8 9 - 12 7 fL    Platelets 354 886 - 395 Thousands/uL    nRBC 0 /100 WBCs    Neutrophils Relative 60 43 - 75 %    Immat GRANS % 0 0 - 2 %    Lymphocytes Relative 26 14 - 44 %    Monocytes Relative 9 4 - 12 %    Eosinophils Relative 4 0 - 6 %    Basophils Relative 1 0 - 1 %    Neutrophils Absolute 5 37 1 85 - 7 62 Thousands/µL    Immature Grans Absolute 0 03 0 00 - 0 20 Thousand/uL    Lymphocytes Absolute 2 35 0 60 - 4 47 Thousands/µL    Monocytes Absolute 0 83 0 17 - 1 22 Thousand/µL    Eosinophils Absolute 0 39 0 00 - 0 61 Thousand/µL    Basophils Absolute 0 11 (H) 0 00 - 0 10 Thousands/µL   Basic metabolic panel    Collection Time: 09/22/22  5:18 PM   Result Value Ref Range    Sodium 140 135 - 147 mmol/L    Potassium 4 9 3 5 - 5 3 mmol/L    Chloride 104 96 - 108 mmol/L    CO2 26 21 - 32 mmol/L    ANION GAP 10 4 - 13 mmol/L    BUN 22 5 - 25 mg/dL    Creatinine 0 99 0 60 - 1 30 mg/dL    Glucose 111 65 - 140 mg/dL    Calcium 9 2 8 3 - 10 1 mg/dL    eGFR 76 ml/min/1 73sq m   HS Troponin 0hr (reflex protocol)    Collection Time: 09/22/22  5:18 PM   Result Value Ref Range    hs TnI 0hr 18 "Refer to ACS Flowchart"- see link ng/L   NT-BNP PRO    Collection Time: 09/22/22  5:18 PM   Result Value Ref Range    NT-proBNP 54 <125 pg/mL   Protime-INR    Collection Time: 09/22/22 5:18 PM   Result Value Ref Range    Protime 13 0 11 6 - 14 5 seconds    INR 1 00 0 84 - 1 19   APTT    Collection Time: 09/22/22  5:18 PM   Result Value Ref Range    PTT 28 23 - 37 seconds   HS Troponin I 2hr    Collection Time: 09/22/22  7:08 PM   Result Value Ref Range    hs TnI 2hr 22 "Refer to ACS Flowchart"- see link ng/L    Delta 2hr hsTnI 4 <20 ng/L   HS Troponin I 4hr    Collection Time: 09/22/22  9:54 PM   Result Value Ref Range    hs TnI 4hr 12 "Refer to ACS Flowchart"- see link ng/L    Delta 4hr hsTnI -6 <20 ng/L   Basic metabolic panel    Collection Time: 09/23/22  9:06 AM   Result Value Ref Range    Sodium 138 135 - 147 mmol/L    Potassium 4 4 3 5 - 5 3 mmol/L    Chloride 103 96 - 108 mmol/L    CO2 30 21 - 32 mmol/L    ANION GAP 5 4 - 13 mmol/L    BUN 23 5 - 25 mg/dL    Creatinine 1 21 0 60 - 1 30 mg/dL    Glucose 112 65 - 140 mg/dL    Glucose, Fasting 112 (H) 65 - 99 mg/dL    Calcium 9 3 8 3 - 10 1 mg/dL    eGFR 60 ml/min/1 73sq m   POCT activated clotting time    Collection Time: 09/23/22 11:14 AM   Result Value Ref Range    Activated Clotting Time, i-STAT 212 (H) 89 - 137 sec    Specimen Type VENOUS    POCT activated clotting time    Collection Time: 09/23/22 11:27 AM   Result Value Ref Range    Activated Clotting Time, i-STAT 218 (H) 89 - 137 sec    Specimen Type VENOUS        This note was created with voice recognition software  Phonic, grammatical and spelling errors may be present within the note as a result

## 2022-10-18 ENCOUNTER — OFFICE VISIT (OUTPATIENT)
Dept: OBGYN CLINIC | Facility: CLINIC | Age: 72
End: 2022-10-18
Payer: COMMERCIAL

## 2022-10-18 VITALS
HEART RATE: 56 BPM | WEIGHT: 219.2 LBS | DIASTOLIC BLOOD PRESSURE: 79 MMHG | SYSTOLIC BLOOD PRESSURE: 131 MMHG | HEIGHT: 72 IN | BODY MASS INDEX: 29.69 KG/M2

## 2022-10-18 DIAGNOSIS — M17.12 PRIMARY OSTEOARTHRITIS OF LEFT KNEE: Primary | ICD-10-CM

## 2022-10-18 DIAGNOSIS — M17.11 PRIMARY OSTEOARTHRITIS OF RIGHT KNEE: ICD-10-CM

## 2022-10-18 PROCEDURE — 20610 DRAIN/INJ JOINT/BURSA W/O US: CPT | Performed by: ORTHOPAEDIC SURGERY

## 2022-10-18 PROCEDURE — 99213 OFFICE O/P EST LOW 20 MIN: CPT | Performed by: ORTHOPAEDIC SURGERY

## 2022-10-18 RX ORDER — BUPIVACAINE HYDROCHLORIDE 2.5 MG/ML
2 INJECTION, SOLUTION INFILTRATION; PERINEURAL
Status: COMPLETED | OUTPATIENT
Start: 2022-10-18 | End: 2022-10-18

## 2022-10-18 RX ORDER — TRIAMCINOLONE ACETONIDE 40 MG/ML
80 INJECTION, SUSPENSION INTRA-ARTICULAR; INTRAMUSCULAR
Status: COMPLETED | OUTPATIENT
Start: 2022-10-18 | End: 2022-10-18

## 2022-10-18 RX ADMIN — TRIAMCINOLONE ACETONIDE 80 MG: 40 INJECTION, SUSPENSION INTRA-ARTICULAR; INTRAMUSCULAR at 14:20

## 2022-10-18 RX ADMIN — BUPIVACAINE HYDROCHLORIDE 2 ML: 2.5 INJECTION, SOLUTION INFILTRATION; PERINEURAL at 14:20

## 2022-10-18 NOTE — PROGRESS NOTES
Orthopaedics Office Visit - Follow up Patient Visit    ASSESSMENT/PLAN:    Assessment:   Bilateral knee osteoarthritis     Plan:   · Bilateral knee CSI's were performed in the office without complication, post injection protocol/expectations were reviewed   · The CSI's can be repeated on a 3 month basis, as needed  · We did discuss Visco supplement injections if or when the CSI's become less effective for him  · Follow up in the office as needed if symptoms worsen or fail to improve      To Do Next Visit:  Re-evaluation     _____________________________________________________  CHIEF COMPLAINT:  Chief Complaint   Patient presents with   • Left Knee - Follow-up   • Right Knee - Follow-up         SUBJECTIVE:  Connie Leslie is a 81 y o  male who presents to the office today for a follow up regarding bilateral knee pain secondary to OA  He underwent bilateral knee CSI's on 1/18/22  He notes that the CSI's did resolve his knee pain until aprox  1 month ago  Pain will worsen when sitting to standing  He recently underwent a stent placement so he is not currently taking anything for his knee pain at this time  He also notes bilateral hip pain which he would like to eventually get evaluated  PAST MEDICAL HISTORY:  Past Medical History:   Diagnosis Date   • Arthritis 2012   • Depression 2012   • Hyperlipidemia    • Hypertension        PAST SURGICAL HISTORY:  Past Surgical History:   Procedure Laterality Date   • ABDOMINAL SURGERY     • CARDIAC CATHETERIZATION N/A 9/23/2022    Procedure: Cardiac catheterization;  Surgeon: Walt Jackson MD;  Location: [facility]; Service: Cardiology   • CARDIAC CATHETERIZATION N/A 9/23/2022    Procedure: Cardiac Coronary Angiogram;  Surgeon: Walt Jackson MD;  Location: [address] [place] [address] CATH LAB; Service: Cardiology   • CARDIAC CATHETERIZATION Left 9/23/2022    Procedure: Cardiac Left Heart Cath;  Surgeon: Walt Jackson MD;  Location: [facility];   Service: Cardiology   • CARDIAC CATHETERIZATION N/A 2022    Procedure: Cardiac pci;  Surgeon: Christa Burns MD;  Location: [address] CATH LAB;   Service: Cardiology   • KNEE ARTHROSCOPY     • NASAL SINUS SURGERY     • IL ARTHROSCOPY SHOULDER SURGICAL BICEPS TENODESIS Right 2017    Procedure: ARTHROSCOPIC BICEPS TENODESIS;  Surgeon: Carri Márquez MD;  Location: [facility];  Service: Orthopedics   • IL SHLDR ARTHROSCOP,SURG,W/ROTAT CUFF REPR Right 2017    Procedure: SHOULDER ARTHROSCOPY ROTATOR CUFF REPAIR ;  Surgeon: Carri Márquez MD;  Location: [facility];  Service: Orthopedics   • IL SURGICAL ARTHROSCOPY [place] Monday DBRDMT 3+ Right 2017    Procedure: ARTHROSCOPY SHOULDER;  Surgeon: Carri Márquez MD;  Location: [facility];  Service: Orthopedics   • TONSILLECTOMY         FAMILY HISTORY:  Family History   Problem Relation Age of Onset   • Cancer Father         Brain tumor   • Heart disease Father    • Hyperlipidemia Father    • Hypertension Father    • Brain cancer Father    • Migraines Mother    • Cancer Brother         Melanoma       SOCIAL HISTORY:  Social History     Tobacco Use   • Smoking status: Former Smoker     Packs/day: 0 25     Years: 30 00     Pack years: 7 50     Types: Cigarettes     Start date:      Quit date:      Years since quittin 8   • Smokeless tobacco: Never Used   Vaping Use   • Vaping Use: Never used   Substance Use Topics   • Alcohol use: Not Currently     Alcohol/week: 0 0 standard drinks     Comment: social   • Drug use: No       MEDICATIONS:    Current Outpatient Medications:   •  aspirin 81 MG tablet, Take 81 mg by mouth daily, Disp: , Rfl:   •  atenolol (TENORMIN) 50 mg tablet, TAKE 1 TABLET DAILY, Disp: 90 tablet, Rfl: 3  •  clopidogrel (PLAVIX) 75 mg tablet, Take 1 tablet (75 mg total) by mouth daily, Disp: 90 tablet, Rfl: 1  •  esomeprazole (NexIUM) 20 mg capsule, Take 20 mg by mouth every morning before breakfast, Disp: , Rfl:   • FLUoxetine (PROzac) 20 mg capsule, TAKE 1 CAPSULE BY MOUTH EVERY DAY, Disp: 90 capsule, Rfl: 3  •  losartan (COZAAR) 25 mg tablet, Take 1 tablet (25 mg total) by mouth daily, Disp: 90 tablet, Rfl: 1  •  multivitamin (THERAGRAN) TABS, Take 1 tablet by mouth daily, Disp: , Rfl:   •  pravastatin (PRAVACHOL) 40 mg tablet, Take 1 tablet (40 mg total) by mouth daily with dinner, Disp: 90 tablet, Rfl: 1  •  tamsulosin (FLOMAX) 0 4 mg, Take 1 capsule (0 4 mg total) by mouth in the morning, Disp: 30 capsule, Rfl: 5  •  Cholecalciferol (VITAMIN D3) 2000 UNITS capsule, Take by mouth (Patient not taking: No sig reported), Disp: , Rfl:     ALLERGIES:  Allergies   Allergen Reactions   • Amlodipine Swelling   • Lisinopril      Other reaction(s): Cough  Other reaction(s): Cough       REVIEW OF SYSTEMS:  MSK: as noted in HPI  Neuro: WNL  Pertinent items are otherwise noted in HPI  A comprehensive review of systems was otherwise negative  LABS:  HgA1c:   Lab Results   Component Value Date    HGBA1C 6 2 (H) 09/20/2022     BMP:   Lab Results   Component Value Date    GLUCOSE 95 10/09/2015    CALCIUM 9 3 09/23/2022     10/09/2015    K 4 4 09/23/2022    CO2 30 09/23/2022     09/23/2022    BUN 23 09/23/2022    CREATININE 1 21 09/23/2022     CBC: No components found for: CBC    _____________________________________________________  PHYSICAL EXAMINATION:  Vital signs: /79   Pulse 56   Ht 6' (1 829 m)   Wt 99 4 kg (219 lb 3 2 oz)   BMI 29 73 kg/m²   General: No acute distress, awake and alert  Psychiatric: Mood and affect appear appropriate  HEENT: Trachea Midline, No torticollis, no apparent facial trauma  Cardiovascular: No audible murmurs;  Extremities appear perfused  Pulmonary: No audible wheezing or stridor  Skin: No open lesions; see further details (if any) below    MUSCULOSKELETAL EXAMINATION:    Extremities: Bilateral knee     No erythema, ecchymosis or edema  Joint line tenderness   Normal knee ROM   No effusion   Stable to varus and valgus stress   Ambulates without assistance   Extremities appear warm and well perfused     _____________________________________________________  STUDIES REVIEWED:  I personally reviewed the images and interpretation is as follows: No new imaging to review     PROCEDURES PERFORMED:  Large joint arthrocentesis: R knee  Universal Protocol:  Consent: Verbal consent obtained  Written consent not obtained  Risks and benefits: risks, benefits and alternatives were discussed  Consent given by: patient  Time out: Immediately prior to procedure a "time out" was called to verify the correct patient, procedure, equipment, support staff and site/side marked as required  Site marked: the operative site was marked  Patient identity confirmed: verbally with patient    Supporting Documentation  Indications: pain   Procedure Details  Location: knee - R knee  Preparation: Patient was prepped and draped in the usual sterile fashion  Needle size: 22 G  Ultrasound guidance: no  Medications administered: 2 mL bupivacaine 0 25 %; 80 mg triamcinolone acetonide 40 mg/mL    Patient tolerance: patient tolerated the procedure well with no immediate complications  Dressing:  Sterile dressing applied    Large joint arthrocentesis: L knee  Universal Protocol:  Consent: Verbal consent obtained  Written consent not obtained  Risks and benefits: risks, benefits and alternatives were discussed  Consent given by: patient  Time out: Immediately prior to procedure a "time out" was called to verify the correct patient, procedure, equipment, support staff and site/side marked as required    Site marked: the operative site was marked  Patient identity confirmed: verbally with patient    Supporting Documentation  Indications: pain   Procedure Details  Location: knee - L knee  Preparation: Patient was prepped and draped in the usual sterile fashion  Needle size: 22 G  Ultrasound guidance: no  Medications administered: 2 mL bupivacaine 0 25 %; 80 mg triamcinolone acetonide 40 mg/mL    Patient tolerance: patient tolerated the procedure well with no immediate complications  Dressing:  Sterile dressing applied        Scribe Attestation    I,:  Susie Swan am acting as a scribe while in the presence of the attending physician :       I,:  Carolyn Cadet MD personally performed the services described in this documentation    as scribed in my presence :

## 2022-10-20 ENCOUNTER — TRANSCRIBE ORDERS (OUTPATIENT)
Dept: CARDIAC REHAB | Facility: CLINIC | Age: 72
End: 2022-10-20

## 2022-10-20 DIAGNOSIS — Z95.5 STATUS POST INSERTION OF DRUG-ELUTING STENT INTO LEFT ANTERIOR DESCENDING (LAD) ARTERY: Primary | ICD-10-CM

## 2022-10-21 ENCOUNTER — TELEPHONE (OUTPATIENT)
Dept: CARDIAC REHAB | Facility: CLINIC | Age: 72
End: 2022-10-21

## 2022-10-24 ENCOUNTER — CLINICAL SUPPORT (OUTPATIENT)
Dept: CARDIAC REHAB | Facility: CLINIC | Age: 72
End: 2022-10-24
Payer: COMMERCIAL

## 2022-10-24 DIAGNOSIS — Z95.5 STATUS POST INSERTION OF DRUG-ELUTING STENT INTO LEFT ANTERIOR DESCENDING (LAD) ARTERY: ICD-10-CM

## 2022-10-24 DIAGNOSIS — R06.09 DYSPNEA ON EXERTION: ICD-10-CM

## 2022-10-24 DIAGNOSIS — R07.9 EXERTIONAL CHEST PAIN: ICD-10-CM

## 2022-10-24 DIAGNOSIS — I20.8 STABLE ANGINA (HCC): ICD-10-CM

## 2022-10-24 PROCEDURE — 93797 PHYS/QHP OP CAR RHAB WO ECG: CPT

## 2022-10-24 NOTE — PROGRESS NOTES
Cardiac Rehabilitation Plan of Care   Initial Care Plan          Today's date: 10/24/2022   # of Exercise Sessions Completed: 1 - Eval   Patient name: Stacey Yancey      : 1950  Age: 81 y o  MRN: [id number]  Referring Physician: Nikki Paget, DO  Cardiologist: No cardiologist assigned yet; Sent to Yaya Katz MD  Provider: Marjorie Santos  Clinician: Satya Zarco MS, CEP    Dx:   Encounter Diagnoses   Name Primary? • Dyspnea on exertion    • Exertional chest pain    • Stable angina [facility])    • Status post insertion of drug-eluting stent into left anterior descending (LAD) artery      Date of onset: 22      SUMMARY OF PROGRESS: Today is Fritz's initial evaluation to begin Cardiac Rehab post DESx1 to his Ost LAD  He was experiencing exertional tightness in his chest that led to cardiac workup  Nuclear stress test came up abnormal and Fritz was immediately sent to the ED for cath where 85% stenosis of LAD was found  He reports no CP since stenting and has been feeling better  The patient does not currently follow a formal exercise program at home  He has resumed all ADLs without limitations and plans on adding more activity once he starts rehab  Depression screening using the PHQ-9 interprets the patient's score of  6  as  5-9 = Mild Depression  LAKISHA-7 screening tool for anxiety suggests 0  0-4  = Not anxious  When addressed, the patient admits to having feelings of depression that started prior to his stent  [place] reports that it has continued however does not want to seek therapy at this time  Patient reports excellent social/emotional support from his family  Information to begin using [employer] was provided as well as contact information for counseling through [employer]  PHQ-9 score will be reassessed in 30 days  The patient is a former smoker (quit 40 years ago)  He has abstained since quitting  Patient admits to 100% medication compliance   Patient reports the following physical limitations: bilat OA of his knees and L torn rotator cuff that needs surgery  The patient completed an initial submaximal TM ETT  The patient completed 10:20 minutes of stage 4 (5 8 METs) with test termination of RHR +30  Resting /74 with appropriate hemodynamic response to exercise reaching 148/70  Patient denied symptoms during exercise  Telemetry revealed NSR with interpolated PVCs at rest noted  Patient was counseled on exercise guidelines to achieve a minimum of 150 mins/wk of moderate intensity (RPE 4-6) exercise and encouraged to add 1-2 days of exercise on opposite days of cardiac rehab as tolerated  We discussed current dietary habits and goals of heart healthy eating for lipid management, weight loss and lowering A1C  Patient's goals include: weight loss with a goal of 180-185lbs, stay healthy for his grand kids, dietary changes, increase strength/endurance, improve feelings of depression  The patient's CAD risk factors include:  inactivity, stress, obesity/overweight, hypertension and hyperlipidemia  His education will focus on lifestyle modification/education specific to His needs  Patient will attend group education classes on heart healthy eating, reading food labels, stress management, risk factor reduction, understanding heart disease and common heart medications  Patient will attend 35 monitored exercise sessions, 3x/wk for 12-18 weeks beginning 10/27/22         Medication compliance: Yes   Comments: Pt reports to be compliant with medications  Fall Risk: Low   Comments: Ambulates with a steady gait with no assist device and recent fall off of laddar in the last 6 months irritating his rotator cuff    EKG Interpretation: NSR with interpolated PVCs noted at rest      EXERCISE ASSESSMENT and PLAN    Exercise Prescription:      Frequency: 3 days/week   Supplement with home exercise 2+ days/wk as tolerated       Minutes: 30-40         METS: 3-4            HR: resting + 30    RPE: 4-6         Modalities: Treadmill, UBE and Lifecycle      30 Day Goals for Exercise Progression:    Frequency: 3 days/week of cardiac rehab       Supplement with home exercise 2+ days/wk as tolerated    Minutes: 40-50                              >150 mins/wk of moderate intensity exercise   METS: 3-5   HR: resting + 30    RPE: 4-6   Modalities: Treadmill, Airdyne bike, UBE and Lifecycle    Strength trainin-3 days / week  12-15 repetitions  1-2 sets per modality   Will be added following 2-3 weeks of monitored exercise sessions   Modalities: Leg Press, Chest Press, Arm Curl and Seated Row    Home Exercise: light walking    Goals: 10% improvement in functional capacity - based on max METs achieved in fitness assessment, Increase in exercise capacity by 40% - based on peak METs tolerated in cardiac rehab exercise session, Exercise 5 days/wk, >150mins/wk of moderate intensity exercise, Resume ADLs with increased strength, Attend Rehab regularly, Start a walking program and return to regular exercise at the gym    Progression Toward Goals:  Reviewed Pt goals and determined plan of care, Patient will add home exercise at community gym in the next 30 days, Will continue to educate and progress as tolerated      Education: benefit of exercise for CAD risk factors, AHA guidelines to achieve >150 mins/wk of moderate exercise and RPE scale   Plan:education on home exercise guidelines and home exercise 30+ mins 2 days opposite CR  Readiness to change: Preparation:  (Getting ready to change)       NUTRITION ASSESSMENT AND PLAN    Weight control:    Starting weight: 211 6 lbs    Current weight:     Waist circumference:    Startin in   Current:      Diabetes: N/A  A1c: 6 2    last measured: 22    Lipid management: Discussed diet and lipid management and Last lipid profile 22  Chol 180    HDL 42      Goals:reduced BMI to < 25, LDL <100, TRG <150, decreased body fat% <25%   (M), reduced waist circumference <40 inches (M), Improved Rate Your Plate score  >15, Wt  loss 1-2 ppw,  goal of 180-185 lbs , choose lean meat (93-95%), eliminate processed meats, reduce portion sizes of meat to 3oz or less, increase intake of fish, shellfish, cook without added fat or use vegetable oil/spray, increase intake of meatless meals, use low fat dairy, reduce cheese intake or use reduced-fat, eat 3 or more servings of whole grains a day, Eat 4-5 cups of fruits and vegetables daily, use olive or canola oil in baking, choose low sodium processed foods, eliminate butter, use fat-free dressings/antoine or seldom use, choose healthy snacks: light popcorn, plain pretzels, Increase intake of nuts and seeds, seldom eat or choose low fat ice-cream, fruit juice bars or frozen yogurt , eliminate or choose low-fat sweets, daily saturated fat intake <7%/13g and seldom eat out or choose lower fat menu items    Progression Toward Goals: Reviewed Pt goals and determined plan of care, Patient will work on lowering sodium and sweets, decrease caffeine consumption, increase fruits/veggies in the next 30 days, Will continue to educate and progress as tolerated  Education: heart healthy eating  low sodium diet  hydration  nutrition for  lipid management  wt  loss   healthy choices while dining out  Plan: Education class: Reading Food Labels, Education Class: Heart Healthy Eating, switch to low fat cheeses, replace butter with soft spreads made with olive oil, canola or yogurt, replace refined grain bread with whole grain bread, replace unhealthy snacks with fruits & vegs, reduce portion sizes, reduce red meat 1x/wk, switch to skim or 1% milk, eat fewer desserts and sweets, avoid processed foods, remove salt shaker from table, use salt substitute like Mrs   Dash, increase utilization of fresh or dried herbs, eat more home cooked meals and eat out less often, will try new grains like brown rice, quinoa, farro, will replace sugar sweetened cereals with whole grain or oatmeal, monitor home blood glucose, reduce alcohol intake, drink more water, learn how to read food labels, replace sugar with stevia or truvia, keep added daily sugar <25g/day and slow down eating time  Readiness to change: Preparation:  (Getting ready to change)       PSYCHOSOCIAL ASSESSMENT AND PLAN    Emotional:  Depression assessment:  PHQ-9 = 6  5-9 = Mild Depression            Anxiety measure:  LAKISHA-7 = 0  0-4  = Not anxious  Self-reported stress level:  3  Social support: Excellent and Patient reports excellent emotional/social support from family    Goals:  Reduce perceived stress to 1-3/10, improved [facility] QOL < 27, Physical Fitness in [facility] Score < 3, Pain in [facility] Score < 3, Increased interest in doing things, improved positive thoughts of well being, increased energy and Feel less anxious    Progression Toward Goals: Reviewed Pt goals and determined plan of care, Patient will consider doing [employer] program, work on stress management in the next 30 days, Will continue to educate and progress as tolerated      Education: signs/sxs of depression, benefits of a positive support system, stress management techniques, depression and CAD and benefits of mental health counseling  Plan: Class: Stress and Your Health, Class: Relaxation, Refer to behavioral health/counseling, PHQ-9 >5 will refer to MD, Refer to [facility], Practice relaxation techniques, Exercise and Enjoy a hobby  Readiness to change: Preparation:  (Getting ready to change)       OTHER CORE COMPONENTS     Tobacco:   Social History     Tobacco Use   Smoking Status Former Smoker   • Packs/day: 0 25   • Years: 30 00   • Pack years: 7 50   • Types: Cigarettes   • Start date: 5   • Quit date:    • Years since quittin 8   Smokeless Tobacco Never Used       Tobacco Use Intervention:   N/A: Pt has a remote history of smoking    Anginal Symptoms:  None   NTG use: No prescription    Blood pressure:    Resting: 130/74   Exercise: 138/78 - 148/70    Goals: consistent BP < 130/80, reduced dietary sodium <2300mg, moderate intensity exercise >150 mins/wk and medication compliance    Progression Toward Goals: Reviewed Pt goals and determined plan of care, Patient will monitor BP at home in the next 30 days, Will continue to educate and progress as tolerated      Education:  understanding high blood pressure and it's relationship to CAD and low sodium diet and HTN  Plan: Class: Understanding Heart Disease, Class: Common Heart Medications, Avoid Processed foods, use salt substitutes and check labels for sodium content  Readiness to change: Action:  (Changing behavior)

## 2022-10-24 NOTE — PROGRESS NOTES
[facility] Cardiopulmonary Rehab  Pepe    Emotional well-being and depression is addressed in the cardiac rehab evaluation  To assess the severity of depression and anxiety, patients are given the PHQ-9 Depression Questionnaire and the LAKISHA-7 Anxiety Questionnaire  This is to inform you that your patient Africa Durán scored a 6 which is interpreted as 5-9 = Mild Depression and a 0 which is interpreted as 0-4  = Not anxious  Malgorzata Medina did admit to feelings of depression that he has been struggling with prior to his cardiac stent  Your patient was provided contact information for [facility] and has been encouraged to enroll in [employer]  A repeat PHQ -9 and LAKISHA-7 will be administered in 30 days to assess improvement

## 2022-10-24 NOTE — PROGRESS NOTES
CARDIAC REHAB ASSESSMENT    Today's date: 2022  Patient name: Hugh Ham     : 1950       MRN: [id number]  PCP: Jason Heck MD  Referring Physician: Valeria Yang DO  Cardiologist: no cardiologist yet  Surgeon: n/a   Dx:   Encounter Diagnoses   Name Primary?    • Dyspnea on exertion    • Exertional chest pain    • Stable angina (HCC)    • Status post insertion of drug-eluting stent into left anterior descending (LAD) artery        Date of onset: 22  Cultural needs: none     Weight    Wt Readings from Last 1 Encounters:   10/18/22 99 4 kg (219 lb 3 2 oz)      Height:   Ht Readings from Last 1 Encounters:   10/18/22 6' (1 829 m)     Medical History:   Past Medical History:   Diagnosis Date   • Arthritis    • Depression    • Hyperlipidemia    • Hypertension          Physical Limitations: OA bilat knees, torn rotator cuff     Fall Risk: Low   Comments: Ambulates with a steady gait with no assist device, Denies a fall in the past 6 months and fell off a ladder recently, no problems but shoulder pain at rotator    Anginal Equivalent: None/denies angina   NTG use: No prescription    Risk Factors   Cholesterol: Yes  Smoking: Former user  HTN: Yes  DM: No  Obesity: No  Inactivity: No  Stress:  perceived stress: 3/10   Stressors:financial, normal daily stressors, reports manageable right now    Goals for Stress Management:Practice Relaxation Techniques and Exercise    Family History:  Family History   Problem Relation Age of Onset   • Cancer Father         Brain tumor   • Heart disease Father    • Hyperlipidemia Father    • Hypertension Father    • Brain cancer Father    • Migraines Mother    • Cancer Brother         Melanoma       Allergies: Amlodipine and Lisinopril  ETOH:   Social History     Substance and Sexual Activity   Alcohol Use Not Currently   • Alcohol/week: 0 0 standard drinks    Comment: social         Current Medications:   Current Outpatient Medications   Medication Sig Dispense Refill   • aspirin 81 MG tablet Take 81 mg by mouth daily     • atenolol (TENORMIN) 50 mg tablet TAKE 1 TABLET DAILY 90 tablet 3   • Cholecalciferol (VITAMIN D3) 2000 UNITS capsule Take by mouth (Patient not taking: No sig reported)     • clopidogrel (PLAVIX) 75 mg tablet Take 1 tablet (75 mg total) by mouth daily 90 tablet 1   • esomeprazole (NexIUM) 20 mg capsule Take 20 mg by mouth every morning before breakfast     • FLUoxetine (PROzac) 20 mg capsule TAKE 1 CAPSULE BY MOUTH EVERY DAY 90 capsule 3   • losartan (COZAAR) 25 mg tablet Take 1 tablet (25 mg total) by mouth daily 90 tablet 1   • multivitamin (THERAGRAN) TABS Take 1 tablet by mouth daily     • pravastatin (PRAVACHOL) 40 mg tablet Take 1 tablet (40 mg total) by mouth daily with dinner 90 tablet 1   • tamsulosin (FLOMAX) 0 4 mg Take 1 capsule (0 4 mg total) by mouth in the morning 30 capsule 5     No current facility-administered medications for this visit           Functional Status Prior to Diagnosis for Treatment   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 [employer]   Recreation: wood working   ADL’s: Capable of performing light to moderate ADLs limited by Chest Pain  Olean: able to perform self-care resumed driving  Exercise: none   Other: chest tightness with exertion     Current Functional Status  Occupation: retired   Recreation: wood working  ADL’s:resumed all ADLs  Olean: able to perform self-care resumed driving  Exercise: light walking    Other: none     Patient Specific Goals:  Goal weight of 180-185lbs; stay healthy for his grand kids; dietary changes, increase strength/endurance, improve feelings of depression    Short Term Program Goals: dietary modifications increased strength improved energy/stamina with ADLs exercise 120-150 mins/wk wt loss 1-2 ppw    Long Term Goals: increased maximal walking duration  increased intial training workload  Improved [facility] Activity Status score  Improved functional capacity  Improved Quality of Life - [facility] score reduced  Improved lipid profile  Reduced body fat%  Reduced waist circumference  Improved A1c  improved Rate Your Plate Score    Ability to reach goals/rehabilitation potential:  Excellent    Projected return to function: 12 weeks  Objective tests: sub-max TM ETT      Nutritional   Reviewed details of Rate your Plate  Discussed key elements of heart healthy eating  Reviewed patient goals for dietary modifications and their clinical implications  Reviewed most recent lipid profile  Goals for dietary modification: choose lean cuts of meat  poultry without the skin  low fat ground meat and poultry  eliminate processed meats  reduce portions of meat to 3 oz  increase fish intake  more meatless meals  low fat dairy   reduced fat cheese  increase whole grains  increase fruits and vegetables  eliminate butter  low sodium  improved snack choices  more nuts/seeds  reduce sweets/frozen desserts  heathier choices while dining out      Emotional/Social  Patient reports feelings of depression   Patient does not have sufficient emotional support  Provided contact information for [facility]    Marital status:     Domestic Violence Screening: No    Comments: EF = 55%; was struggling with swollen ankles for a while; DESx1 to ostial LAD; Had nuclear stress test 9/22, test was abnormal  Sent to ED for emergency cath  1st degree AV block noted       S/S experienced tightness in chest with walking    Hx torn rotator cuff, bilat knee OA

## 2022-10-27 ENCOUNTER — CLINICAL SUPPORT (OUTPATIENT)
Dept: CARDIAC REHAB | Facility: CLINIC | Age: 72
End: 2022-10-27
Payer: COMMERCIAL

## 2022-10-27 DIAGNOSIS — Z95.5 STATUS POST INSERTION OF DRUG-ELUTING STENT INTO LEFT ANTERIOR DESCENDING (LAD) ARTERY: Primary | ICD-10-CM

## 2022-10-27 PROCEDURE — 93798 PHYS/QHP OP CAR RHAB W/ECG: CPT

## 2022-10-28 ENCOUNTER — CLINICAL SUPPORT (OUTPATIENT)
Dept: CARDIAC REHAB | Facility: CLINIC | Age: 72
End: 2022-10-28
Payer: COMMERCIAL

## 2022-10-28 DIAGNOSIS — Z95.5 STATUS POST INSERTION OF DRUG-ELUTING STENT INTO LEFT ANTERIOR DESCENDING (LAD) ARTERY: Primary | ICD-10-CM

## 2022-10-28 PROCEDURE — 93798 PHYS/QHP OP CAR RHAB W/ECG: CPT

## 2022-11-01 ENCOUNTER — CLINICAL SUPPORT (OUTPATIENT)
Dept: CARDIAC REHAB | Facility: CLINIC | Age: 72
End: 2022-11-01

## 2022-11-01 DIAGNOSIS — Z95.5 STATUS POST INSERTION OF DRUG-ELUTING STENT INTO LEFT ANTERIOR DESCENDING (LAD) ARTERY: Primary | ICD-10-CM

## 2022-11-03 ENCOUNTER — CLINICAL SUPPORT (OUTPATIENT)
Dept: CARDIAC REHAB | Facility: CLINIC | Age: 72
End: 2022-11-03

## 2022-11-03 DIAGNOSIS — Z95.5 STATUS POST INSERTION OF DRUG-ELUTING STENT INTO LEFT ANTERIOR DESCENDING (LAD) ARTERY: Primary | ICD-10-CM

## 2022-11-04 ENCOUNTER — CLINICAL SUPPORT (OUTPATIENT)
Dept: CARDIAC REHAB | Facility: CLINIC | Age: 72
End: 2022-11-04

## 2022-11-04 DIAGNOSIS — Z95.5 STATUS POST INSERTION OF DRUG-ELUTING STENT INTO LEFT ANTERIOR DESCENDING (LAD) ARTERY: Primary | ICD-10-CM

## 2022-11-08 ENCOUNTER — CLINICAL SUPPORT (OUTPATIENT)
Dept: CARDIAC REHAB | Facility: CLINIC | Age: 72
End: 2022-11-08

## 2022-11-08 DIAGNOSIS — Z95.5 STATUS POST INSERTION OF DRUG-ELUTING STENT INTO LEFT ANTERIOR DESCENDING (LAD) ARTERY: Primary | ICD-10-CM

## 2022-11-10 ENCOUNTER — CLINICAL SUPPORT (OUTPATIENT)
Dept: CARDIAC REHAB | Facility: CLINIC | Age: 72
End: 2022-11-10

## 2022-11-10 DIAGNOSIS — Z95.5 STATUS POST INSERTION OF DRUG-ELUTING STENT INTO LEFT ANTERIOR DESCENDING (LAD) ARTERY: Primary | ICD-10-CM

## 2022-11-11 ENCOUNTER — CLINICAL SUPPORT (OUTPATIENT)
Dept: CARDIAC REHAB | Facility: CLINIC | Age: 72
End: 2022-11-11

## 2022-11-11 DIAGNOSIS — Z95.5 STATUS POST INSERTION OF DRUG-ELUTING STENT INTO LEFT ANTERIOR DESCENDING (LAD) ARTERY: Primary | ICD-10-CM

## 2022-11-15 ENCOUNTER — CLINICAL SUPPORT (OUTPATIENT)
Dept: CARDIAC REHAB | Facility: CLINIC | Age: 72
End: 2022-11-15

## 2022-11-15 DIAGNOSIS — Z95.5 STATUS POST INSERTION OF DRUG-ELUTING STENT INTO LEFT ANTERIOR DESCENDING (LAD) ARTERY: Primary | ICD-10-CM

## 2022-11-17 ENCOUNTER — CLINICAL SUPPORT (OUTPATIENT)
Dept: CARDIAC REHAB | Facility: CLINIC | Age: 72
End: 2022-11-17

## 2022-11-17 DIAGNOSIS — Z95.5 STATUS POST INSERTION OF DRUG-ELUTING STENT INTO LEFT ANTERIOR DESCENDING (LAD) ARTERY: Primary | ICD-10-CM

## 2022-11-18 ENCOUNTER — CLINICAL SUPPORT (OUTPATIENT)
Dept: CARDIAC REHAB | Facility: CLINIC | Age: 72
End: 2022-11-18

## 2022-11-18 DIAGNOSIS — Z95.5 STATUS POST INSERTION OF DRUG-ELUTING STENT INTO LEFT ANTERIOR DESCENDING (LAD) ARTERY: Primary | ICD-10-CM

## 2022-11-21 ENCOUNTER — OFFICE VISIT (OUTPATIENT)
Dept: INTERNAL MEDICINE CLINIC | Facility: CLINIC | Age: 72
End: 2022-11-21

## 2022-11-21 VITALS
OXYGEN SATURATION: 96 % | SYSTOLIC BLOOD PRESSURE: 134 MMHG | HEART RATE: 60 BPM | WEIGHT: 217 LBS | RESPIRATION RATE: 14 BRPM | BODY MASS INDEX: 29.39 KG/M2 | HEIGHT: 72 IN | DIASTOLIC BLOOD PRESSURE: 60 MMHG

## 2022-11-21 DIAGNOSIS — I10 BENIGN HYPERTENSION: ICD-10-CM

## 2022-11-21 DIAGNOSIS — I25.10 CORONARY ARTERY DISEASE INVOLVING NATIVE CORONARY ARTERY OF NATIVE HEART WITHOUT ANGINA PECTORIS: Primary | ICD-10-CM

## 2022-11-21 DIAGNOSIS — R73.01 IMPAIRED FASTING GLUCOSE: ICD-10-CM

## 2022-11-21 NOTE — PROGRESS NOTES
Assessment/Plan:       Chronic problems are stable  Continue present medications  Continue diet and exercise  Ordered labs for next visit  Quality Measures:       Return in about 4 months (around 3/21/2023) for Recheck  No problem-specific Assessment & Plan notes found for this encounter  Diagnoses and all orders for this visit:    Coronary artery disease involving native coronary artery of native heart without angina pectoris    Benign hypertension  -     Comprehensive metabolic panel; Future  -     CBC and differential; Future  -     Lipid panel; Future    Impaired fasting glucose  -     Hemoglobin A1C; Future        Subjective:      Patient ID: Nico Jenkins is a 81 y o  male  Patient comes in today for routine follow-up  He states he is doing well  Going through cardiac rehab  Doing well  Was having some trouble with his pressure but he feels that has normalized  Trying to watch his diet  No new complaints today  No further additions to his history        ALLERGIES:  Allergies   Allergen Reactions   • Amlodipine Swelling   • Lisinopril      Other reaction(s): Cough  Other reaction(s): Cough       CURRENT MEDICATIONS:    Current Outpatient Medications:   •  aspirin 81 MG tablet, Take 81 mg by mouth daily, Disp: , Rfl:   •  atenolol (TENORMIN) 50 mg tablet, TAKE 1 TABLET DAILY, Disp: 90 tablet, Rfl: 3  •  clopidogrel (PLAVIX) 75 mg tablet, Take 1 tablet (75 mg total) by mouth daily, Disp: 90 tablet, Rfl: 1  •  esomeprazole (NexIUM) 20 mg capsule, Take 20 mg by mouth every morning before breakfast, Disp: , Rfl:   •  FLUoxetine (PROzac) 20 mg capsule, TAKE 1 CAPSULE BY MOUTH EVERY DAY, Disp: 90 capsule, Rfl: 3  •  losartan (COZAAR) 25 mg tablet, Take 1 tablet (25 mg total) by mouth daily, Disp: 90 tablet, Rfl: 1  •  multivitamin (THERAGRAN) TABS, Take 1 tablet by mouth daily, Disp: , Rfl:   •  pravastatin (PRAVACHOL) 40 mg tablet, Take 1 tablet (40 mg total) by mouth daily with dinner, Disp: 90 tablet, Rfl: 1  •  tamsulosin (FLOMAX) 0 4 mg, TAKE 1 CAPSULE BY MOUTH IN THE MORNING, Disp: 30 capsule, Rfl: 5    ACTIVE PROBLEM LIST:  Patient Active Problem List   Diagnosis   • Complete tear of left rotator cuff   • Biceps tendinitis   • Benign hypertension   • Allergic rhinitis   • Hyperlipidemia   • Depression   • Impaired fasting glucose   • Vitamin D deficiency   • Bilateral primary osteoarthritis of knee   • Primary osteoarthritis of right knee   • Primary osteoarthritis of left knee   • Chronic venous insufficiency   • Stable angina ([facility] [address] )   • Coronary artery disease involving native coronary artery of native heart without angina pectoris   • Status post insertion of drug-eluting stent into left anterior descending (LAD) artery       PAST MEDICAL HISTORY:  Past Medical History:   Diagnosis Date   • Arthritis 2012   • Depression 2012   • Hyperlipidemia    • Hypertension        PAST SURGICAL HISTORY:  Past Surgical History:   Procedure Laterality Date   • ABDOMINAL SURGERY     • CARDIAC CATHETERIZATION N/A 9/23/2022    Procedure: Cardiac catheterization;  Surgeon: Glenny Cason MD;  Location: [facility] CARD[facility]; Service: Cardiology   • CARDIAC CATHETERIZATION N/A 9/23/2022    Procedure: Cardiac Coronary Angiogram;  Surgeon: Glenny Cason MD;  Location: [address] [place] [address] CATH LAB; Service: Cardiology   • CARDIAC CATHETERIZATION Left 9/23/2022    Procedure: Cardiac Left Heart Cath;  Surgeon: Glenny Cason MD;  Location: [facility] CARDI[facility]; Service: Cardiology   • CARDIAC CATHETERIZATION N/A 9/23/2022    Procedure: Cardiac pci;  Surgeon: Glenny Cason MD;  Location: [address] [place] [address] CATH LAB;   Service: Cardiology   • KNEE ARTHROSCOPY     • NASAL SINUS SURGERY     • TX ARTHROSCOPY SHOULDER SURGICAL BICEPS TENODESIS Right 1/25/2017    Procedure: ARTHROSCOPIC BICEPS TENODESIS;  Surgeon: Amos Cooper MD;  Location: [facility] MAIN [facility];  Service: Orthopedics   • TX SHLDR ARTHROSCOP,SURG,W/ROTAT CUFF REPR Right 2017    Procedure: SHOULDER ARTHROSCOPY ROTATOR CUFF REPAIR ;  Surgeon: Amos Cooper MD;  Location: [facility];  Service: Orthopedics   • DE SURGICAL ARTHROSCOPY Gabriella Spencer DBRDMT 3+ Right 2017    Procedure: ARTHROSCOPY SHOULDER;  Surgeon: Amos Cooper MD;  Location: [facility];  Service: Orthopedics   • TONSILLECTOMY         FAMILY HISTORY:  Family History   Problem Relation Age of Onset   • Cancer Father         Brain tumor   • Heart disease Father    • Hyperlipidemia Father    • Hypertension Father    • Brain cancer Father    • Migraines Mother    • Cancer Brother         Melanoma       SOCIAL HISTORY:  Social History     Socioeconomic History   • Marital status: /Civil Union     Spouse name: Not on file   • Number of children: Not on file   • Years of education: Not on file   • Highest education level: Not on file   Occupational History   • Occupation: Part time   Tobacco Use   • Smoking status: Former     Packs/day: 0 25     Years: 30 00     Pack years: 7 50     Types: Cigarettes     Start date: 5     Quit date:      Years since quittin 9   • Smokeless tobacco: Never   Vaping Use   • Vaping Use: Never used   Substance and Sexual Activity   • Alcohol use: Not Currently     Alcohol/week: 0 0 standard drinks     Comment: social   • Drug use: No   • Sexual activity: Yes     Partners: Female     Birth control/protection: Condom Male     Comment: Denied high risk sexual behavior   Other Topics Concern   • Not on file   Social History Narrative   • Not on file     Social Determinants of Health     Financial Resource Strain: Not on file   Food Insecurity: Not on file   Transportation Needs: Not on file   Physical Activity: Not on file   Stress: Not on file   Social Connections: Not on file   Intimate Partner Violence: Not on file   Housing Stability: Not on file       Review of Systems   Respiratory: Negative for shortness of breath      Cardiovascular: Negative for chest pain  Gastrointestinal: Negative for abdominal pain  Objective:  Vitals:    11/21/22 1553   BP: 134/60   BP Location: Left arm   Patient Position: Sitting   Pulse: 60   Resp: 14   SpO2: 96%   Weight: 98 4 kg (217 lb)   Height: 6' (1 829 m)     Body mass index is 29 43 kg/m²  Physical Exam  Vitals and nursing note reviewed  Constitutional:       Appearance: He is well-developed and well-nourished  Cardiovascular:      Rate and Rhythm: Normal rate and regular rhythm  Heart sounds: Normal heart sounds  Pulmonary:      Effort: Pulmonary effort is normal       Breath sounds: Normal breath sounds  Abdominal:      Palpations: Abdomen is soft  Tenderness: There is no abdominal tenderness  Musculoskeletal:         General: No edema  Neurological:      Mental Status: He is alert and oriented to person, place, and time  RESULTS:    No results found for this or any previous visit (from the past 1008 hour(s))  This note was created with voice recognition software  Phonic, grammatical and spelling errors may be present within the note as a result

## 2022-11-22 ENCOUNTER — TELEPHONE (OUTPATIENT)
Dept: CARDIOLOGY CLINIC | Facility: CLINIC | Age: 72
End: 2022-11-22

## 2022-11-22 ENCOUNTER — CLINICAL SUPPORT (OUTPATIENT)
Dept: CARDIAC REHAB | Facility: CLINIC | Age: 72
End: 2022-11-22

## 2022-11-22 DIAGNOSIS — Z95.5 STATUS POST INSERTION OF DRUG-ELUTING STENT INTO LEFT ANTERIOR DESCENDING (LAD) ARTERY: Primary | ICD-10-CM

## 2022-11-22 NOTE — TELEPHONE ENCOUNTER
----- Message from Marsha Mishra sent at 11/22/2022  2:36 PM EST -----  Regarding: Request  Note  Contact: [phone]  I spoke to Cardiac Rehab when I was there earlier today  I was told that as long as I stick to the exercises that I'm already doing they are fine with it

## 2022-11-22 NOTE — PROGRESS NOTES
Cardiac Rehabilitation Plan of Care   30 Day Reassessment          Today's date: 2022   # of Exercise Sessions Completed: 13  Patient name: Yareli Cortes      : 1950  Age: 81 y o  MRN: [id number]  Referring Physician: Jovon Melgoza DO  Cardiologist: No cardiologist assigned yet; Sent to Christiano Orr MD  Provider: Sandee Rushing  Clinician: Nunu Mueller MS, CEP    Dx:   Encounter Diagnosis   Name Primary? • Status post insertion of drug-eluting stent into left anterior descending (LAD) artery Yes     Date of onset: 22      SUMMARY OF PROGRESS: Kalen Choudhary is compliant attending cardiac rehab exercise sessions 3x/wk  He is attending post DESx1 to his Ost LAD  He was experiencing exertional tightness in his chest that led to cardiac workup  Nuclear stress test came up abnormal and Fritz was immediately sent to the ED for cath where 85% stenosis of LAD was found  He reports no CP since stenting and has been feeling better  He tolerates 35-40 mins at 2 4 - 4 3 METs plus wt training  He is tolerating progression of intensity levels to maintain RPE 4-6  Resting /64 - 162/76 with appropriate response to exercise reaching 126/72 - 154/64  His BP's at rest have fluctuated however are highly affected by his driving stress  Breathing techniques were reinforced with him  NSR on tele with no ectopy observed  RHR 53 - 67 ExHR 93 - 110  He has not added home exercise however plans on joining a gym to be able to add exercise  He plans on going 1-2 days/wk which includes exercise as he does here in rehab  No cardiac complaints  He is not progressing toward wt loss goals with a gain of 2 pounds  Patient has been working on  dietary modifications with the goal of rare red/processed meats, low fat dairy, reduced added sugars and refined flours  The patient is a former smoker (quit 40 years ago)  Depression screening using the PHQ-9 was reassessed   The patient's score was 3 (1-4 = Minimal Depression) showing an IMPROVEMENT   LAKISHA-7 was reassessed  The patient's score was 2  (0-4  = Not anxious) showing an a slight increase however no change in overall category  When addressed, the patient admits to depression/anxiety however denies therapy or further intervention at this time  Patient reports excellent social/emotional support  Patient attends group educational classes on cardiac risk factor modification  His exercise program will be progressed as tolerated to maintain RPE 4-6  The patient has the following personal goals he hopes to achieved by discharge: weight loss with a goal of 180-185lbs, stay healthy for his grand kids, dietary changes, increase strength/endurance, improve feelings of depression   Pt will continue to be educated on lifestyle modifications and encouraged to supplement with a home exercise program to reach the following goals in the next 30 days: add home exercise by joining a gym, weight loss, improve stress management, decrease BP at rest     Medication compliance: Yes   Comments: Pt reports to be compliant with medications  Fall Risk: Low   Comments: Ambulates with a steady gait with no assist device and recent fall off of laddar in the last 6 months irritating his rotator cuff    EKG Interpretation: NSR with interpolated PVCs noted at rest      EXERCISE ASSESSMENT and PLAN    Exercise Prescription:      Frequency: 3 days/week   Supplement with home exercise 2+ days/wk as tolerated       Minutes: 35-40         METS: 2 4- 4 3             HR: 93 - 110     RPE: 4-5         Modalities: Treadmill, Airdyne bike, UBE, Lifecycle and NuStep      30 Day Goals for Exercise Progression:    Frequency: 3 days/week of cardiac rehab       Supplement with home exercise 2+ days/wk as tolerated    Minutes: 40-50                              >150 mins/wk of moderate intensity exercise   METS: 3-5   HR: resting + 30    RPE: 4-6   Modalities: Treadmill, Airdyne bike, UBE, Lifecycle, Elliptical and NuStep    Strength trainin-3 days / week  12-15 repetitions  1-2 sets per modality    Modalities: Leg Press, Chest Press, Pull Downs, Arm Curl and Seated Row    Home Exercise: light walking    Goals: 10% improvement in functional capacity - based on max METs achieved in fitness assessment, Increase in exercise capacity by 40% - based on peak METs tolerated in cardiac rehab exercise session, Exercise 5 days/wk, >150mins/wk of moderate intensity exercise, Resume ADLs with increased strength, Attend Rehab regularly, Start a walking program and return to regular exercise at the gym    Progression Toward Goals:  Pt is progressing and showing improvement  toward the following goals:  exercise tolerance and incraesed strength   , Patient will add home exercise at community gym in the next 30 days, Will continue to educate and progress as tolerated      Education: benefit of exercise for CAD risk factors, AHA guidelines to achieve >150 mins/wk of moderate exercise and RPE scale   Plan:education on home exercise guidelines, home exercise 30+ mins 2 days opposite CR and Education class: Risk Factors for Heart Disease  Readiness to change: Action:  (Changing behavior)      NUTRITION ASSESSMENT AND PLAN    Weight control:    Starting weight: 211 6 lbs    Current weight:   213 lbs   Waist circumference:    Startin in   Current:      Diabetes: N/A  A1c: 6 2    last measured: 22    Lipid management: Discussed diet and lipid management and Last lipid profile 22  Chol 180    HDL 42      Goals:reduced BMI to < 25, LDL <100, TRG <150, decreased body fat% <25%   (M), reduced waist circumference <40 inches (M), Improved Rate Your Plate score  >13, Wt  loss 1-2 ppw,  goal of 180-185 lbs , choose lean meat (93-95%), eliminate processed meats, reduce portion sizes of meat to 3oz or less, increase intake of fish, shellfish, cook without added fat or use vegetable oil/spray, increase intake of meatless meals, use low fat dairy, reduce cheese intake or use reduced-fat, eat 3 or more servings of whole grains a day, Eat 4-5 cups of fruits and vegetables daily, use olive or canola oil in baking, choose low sodium processed foods, eliminate butter, use fat-free dressings/antoine or seldom use, choose healthy snacks: light popcorn, plain pretzels, Increase intake of nuts and seeds, seldom eat or choose low fat ice-cream, fruit juice bars or frozen yogurt , eliminate or choose low-fat sweets, daily saturated fat intake <7%/13g and seldom eat out or choose lower fat menu items    Progression Toward Goals: Pt has not made progress toward the following goals: weight loss, has gained 2 lbs since starting  , Patient will work on lowering sodium and sweets, decrease caffeine consumption, increase fruits/veggies in the next 30 days, Will continue to educate and progress as tolerated  Education: heart healthy eating  low sodium diet  hydration  nutrition for  lipid management  wt  loss   healthy choices while dining out  education class: Heart Healthy Eating  education class:  Label Reading  Plan: switch to low fat cheeses, replace butter with soft spreads made with olive oil, canola or yogurt, replace refined grain bread with whole grain bread, replace unhealthy snacks with fruits & vegs, reduce portion sizes, reduce red meat 1x/wk, switch to skim or 1% milk, eat fewer desserts and sweets, avoid processed foods, remove salt shaker from table, use salt substitute like Mrs   Dash, increase utilization of fresh or dried herbs, eat more home cooked meals and eat out less often, will try new grains like brown rice, quinoa, farro, will replace sugar sweetened cereals with whole grain or oatmeal, monitor home blood glucose, reduce alcohol intake, drink more water, learn how to read food labels, replace sugar with stevia or truvia, keep added daily sugar <25g/day and slow down eating time  Readiness to change: Action:  (Changing behavior)      PSYCHOSOCIAL ASSESSMENT AND PLAN    Emotional:  Depression assessment:  PHQ-9 = 3  1-4 = Minimal Depression            Anxiety measure:  LAKISHA-7 = 2  0-4  = Not anxious  Self-reported stress level:  3  Social support: Excellent and Patient reports excellent emotional/social support from family    Goals:  Reduce perceived stress to 1-3/10, improved [facility] QOL < 27, Physical Fitness in [facility] Score < 3, Pain in [facility] Score < 3, Increased interest in doing things, improved positive thoughts of well being, increased energy and Feel less anxious    Progression Toward Goals: Pt is progressing and showing improvement  toward the following goals:  improving PHQ-9 scores  , Patient will consider doing [employer] program, work on stress management in the next 30 days, Will continue to educate and progress as tolerated      Education: signs/sxs of depression, benefits of a positive support system, stress management techniques, depression and CAD, benefits of mental health counseling, class:  Stress and Your Health  and class:  Relaxation  Plan: Refer to behavioral health/counseling, PHQ-9 >5 will refer to MD, Refer to [employer], Practice relaxation techniques, Exercise, Enjoy a hobby and Keep a positive mindset  Readiness to change: Action:  (Changing behavior)      OTHER CORE COMPONENTS     Tobacco:   Social History     Tobacco Use   Smoking Status Former   • Packs/day: 0 25   • Years: 30 00   • Pack years: 7 50   • Types: Cigarettes   • Start date:    • Quit date:    • Years since quittin 9   Smokeless Tobacco Never       Tobacco Use Intervention:   N/A: Pt has a remote history of smoking    Anginal Symptoms:  None   NTG use: No prescription    Blood pressure:    Restin/64 - 164/76    Exercise: 126/72 - 154/64    Goals: consistent BP < 130/80, reduced dietary sodium <2300mg, moderate intensity exercise >150 mins/wk and medication compliance    Progression Toward Goals: Pt is progressing and showing improvement  toward the following goals:  working on monitoring his BP and lowering it at rest   , Patient will monitor BP at home in the next 30 days, Will continue to educate and progress as tolerated      Education:  understanding high blood pressure and it's relationship to CAD and low sodium diet and HTN  Plan: Class: Understanding Heart Disease, Class: Common Heart Medications, Avoid Processed foods, use salt substitutes and check labels for sodium content  Readiness to change: Action:  (Changing behavior)

## 2022-11-22 NOTE — TELEPHONE ENCOUNTER
----- Message from Marsha Mishra sent at 11/22/2022 12:50 PM EST -----  Regarding: Request  Note  Contact: [phone]  Good afternoon  I am currently attending cardiac rehab three times per week  I would like to use the gym in my community on the days I don't attend rehab  The  has asked me to get a note stating that I am cleared to do the exercises I am currently doing at rehab before I get my membership  Is that something you can help me with? Thanks in advance for your help

## 2022-11-25 ENCOUNTER — APPOINTMENT (OUTPATIENT)
Dept: CARDIAC REHAB | Facility: CLINIC | Age: 72
End: 2022-11-25

## 2022-11-29 ENCOUNTER — CLINICAL SUPPORT (OUTPATIENT)
Dept: CARDIAC REHAB | Facility: CLINIC | Age: 72
End: 2022-11-29

## 2022-11-29 DIAGNOSIS — Z95.5 STATUS POST INSERTION OF DRUG-ELUTING STENT INTO LEFT ANTERIOR DESCENDING (LAD) ARTERY: Primary | ICD-10-CM

## 2022-12-01 ENCOUNTER — CLINICAL SUPPORT (OUTPATIENT)
Dept: CARDIAC REHAB | Facility: CLINIC | Age: 72
End: 2022-12-01

## 2022-12-01 DIAGNOSIS — Z95.5 STATUS POST INSERTION OF DRUG-ELUTING STENT INTO LEFT ANTERIOR DESCENDING (LAD) ARTERY: Primary | ICD-10-CM

## 2022-12-02 ENCOUNTER — CLINICAL SUPPORT (OUTPATIENT)
Dept: CARDIAC REHAB | Facility: CLINIC | Age: 72
End: 2022-12-02

## 2022-12-02 DIAGNOSIS — Z95.5 STATUS POST INSERTION OF DRUG-ELUTING STENT INTO LEFT ANTERIOR DESCENDING (LAD) ARTERY: Primary | ICD-10-CM

## 2022-12-06 ENCOUNTER — CLINICAL SUPPORT (OUTPATIENT)
Dept: CARDIAC REHAB | Facility: CLINIC | Age: 72
End: 2022-12-06

## 2022-12-06 DIAGNOSIS — Z95.5 STATUS POST INSERTION OF DRUG-ELUTING STENT INTO LEFT ANTERIOR DESCENDING (LAD) ARTERY: Primary | ICD-10-CM

## 2022-12-08 ENCOUNTER — CLINICAL SUPPORT (OUTPATIENT)
Dept: CARDIAC REHAB | Facility: CLINIC | Age: 72
End: 2022-12-08

## 2022-12-08 DIAGNOSIS — Z95.5 STATUS POST INSERTION OF DRUG-ELUTING STENT INTO LEFT ANTERIOR DESCENDING (LAD) ARTERY: Primary | ICD-10-CM

## 2022-12-09 ENCOUNTER — CLINICAL SUPPORT (OUTPATIENT)
Dept: CARDIAC REHAB | Facility: CLINIC | Age: 72
End: 2022-12-09

## 2022-12-09 DIAGNOSIS — Z95.5 STATUS POST INSERTION OF DRUG-ELUTING STENT INTO LEFT ANTERIOR DESCENDING (LAD) ARTERY: Primary | ICD-10-CM

## 2022-12-13 ENCOUNTER — CLINICAL SUPPORT (OUTPATIENT)
Dept: CARDIAC REHAB | Facility: CLINIC | Age: 72
End: 2022-12-13

## 2022-12-13 DIAGNOSIS — Z95.5 STATUS POST INSERTION OF DRUG-ELUTING STENT INTO LEFT ANTERIOR DESCENDING (LAD) ARTERY: Primary | ICD-10-CM

## 2022-12-15 ENCOUNTER — VBI (OUTPATIENT)
Dept: ADMINISTRATIVE | Facility: OTHER | Age: 72
End: 2022-12-15

## 2022-12-15 ENCOUNTER — APPOINTMENT (OUTPATIENT)
Dept: CARDIAC REHAB | Facility: CLINIC | Age: 72
End: 2022-12-15

## 2022-12-16 ENCOUNTER — APPOINTMENT (OUTPATIENT)
Dept: CARDIAC REHAB | Facility: CLINIC | Age: 72
End: 2022-12-16

## 2022-12-20 ENCOUNTER — CLINICAL SUPPORT (OUTPATIENT)
Dept: CARDIAC REHAB | Facility: CLINIC | Age: 72
End: 2022-12-20

## 2022-12-20 DIAGNOSIS — Z95.5 STATUS POST INSERTION OF DRUG-ELUTING STENT INTO LEFT ANTERIOR DESCENDING (LAD) ARTERY: Primary | ICD-10-CM

## 2022-12-20 NOTE — PROGRESS NOTES
Cardiac Rehabilitation Plan of Care   60 Day Reassessment          Today's date: 2022   # of Exercise Sessions Completed: 21  Patient name: Lila Cristina      : 1950  Age: 81 y o  MRN: [id number]  Referring Physician: Hailee Amato DO  Cardiologist: No cardiologist assigned yet; Sent to Coral Campos MD  Provider: [facility]  Clinician: Jamia Yan MS, CEP    Dx:   Encounter Diagnosis   Name Primary? • Status post insertion of drug-eluting stent into left anterior descending (LAD) artery Yes     Date of onset: 22      SUMMARY OF PROGRESS: Ross Bhatt is compliant attending cardiac rehab exercise sessions 3x/wk  He is attending post DESx1 to his Ost LAD  He was experiencing exertional tightness in his chest that led to cardiac workup  Nuclear stress test came up abnormal and Fritz was immediately sent to the ED for cath where 85% stenosis of LAD was found  He reports no CP since stenting and has been feeling better  He tolerates 35-40 mins at 4 33 - 4 36 METs plus wt training  He is tolerating progression of intensity levels to maintain RPE 4-6  Resting /74 - 144/68 with appropriate response to exercise reaching 128/62 - 146/78  His BP's at rest have fluctuated however are highly affected by his driving stress  Breathing techniques were reinforced with him  NSR on tele with no ectopy observed  RHR 56 - 74 ExHR 94 - 116  He has not added home exercise however plans on joining a gym to be able to add exercise  He plans on going 1-2 days/wk which includes exercise as he does here in rehab  He was encouraged to do this in the new year to get ready for DC from cardiac rehab  No cardiac complaints  He is maintaining his weight at 211 lbs and admits to not always following a hear healthy diet  Patient has been working on dietary modifications with the goal of rare red/processed meats, low fat dairy, reduced added sugars and refined flours   He is currently having a hard time avoiding sugar and sweets as his wife bakes a lot for her job  Rachel Arshad reports to have also switched to a salt alternative which he was encouraged to still use sparingly due to it being potassium based  The patient is a former smoker (quit 40 years ago)  PHQ-9 and LAKISHA-7 were not reassessed due to lower scoring at 30 day note  Most recent assessment found PHQ-9 at a 3 suggesting 1-4 = Minimal Depression and LAKISHA-7 at a 2 suggesting 0-4  = Not anxious  When addressed, the patient admits to sometimes having feelings of anxiety however denies therapy or further intervention at this time  He does report having a hard time getting motivated to do things around his house since retiring  He was encouraged to set daily goals to help give himself a schedule and a sense of accomplishment on a daily basis  Patient reports excellent social/emotional support from his family  Patient attends group educational classes on cardiac risk factor modification  His exercise program will be progressed as tolerated to maintain RPE 4-6  The patient has the following personal goals he hopes to achieved by discharge: weight loss with a goal of 180-185lbs, stay healthy for his grand kids, dietary changes, increase strength/endurance, improve feelings of depression  Pt will continue to be educated on lifestyle modifications and encouraged to supplement with a home exercise program to reach the following goals in the next 30 days: add home exercise by joining a gym, weight loss, improve stress management, improve BP at rest, work on setting daily goals, increase motivation to do things      Medication compliance: Yes   Comments: Pt reports to be compliant with medications  Fall Risk: Low   Comments: Ambulates with a steady gait with no assist device and recent fall off of laddar in the last 6 months irritating his rotator cuff    EKG Interpretation: NSR with interpolated PVCs noted at rest      EXERCISE ASSESSMENT and PLAN    Exercise Prescription:      Frequency: 3 days/week   Supplement with home exercise 2+ days/wk as tolerated       Minutes: 35-40         METS: 4 33-4 36            HR: 94 - 116     RPE: 4-5         Modalities: Treadmill, Airdyne bike, UBE, Lifecycle and NuStep      30 Day Goals for Exercise Progression:    Frequency: 3 days/week of cardiac rehab       Supplement with home exercise 2+ days/wk as tolerated    Minutes: 40-50                              >150 mins/wk of moderate intensity exercise   METS: 4-6   HR: resting + 30    RPE: 4-6   Modalities: Treadmill, Airdyne bike, UBE, Lifecycle, Elliptical and NuStep    Strength trainin-3 days / week  12-15 repetitions  1-2 sets per modality    Modalities: Leg Press, Chest Press, Pull Downs, Arm Curl and Seated Row    Home Exercise: light walking    Goals: 10% improvement in functional capacity - based on max METs achieved in fitness assessment, Increase in exercise capacity by 40% - based on peak METs tolerated in cardiac rehab exercise session, Exercise 5 days/wk, >150mins/wk of moderate intensity exercise, Resume ADLs with increased strength, Attend Rehab regularly, Start a walking program and return to regular exercise at the gym    Progression Toward Goals:  Pt is progressing and showing improvement  toward the following goals:  exercise tolerance and increased strength   , Patient will add home exercise at community gym in the next 30 days, Will continue to educate and progress as tolerated      Education: benefit of exercise for CAD risk factors, home exercise guidelines, AHA guidelines to achieve >150 mins/wk of moderate exercise, RPE scale and class: Risk Factors for Heart Disease   Plan:education on home exercise guidelines and home exercise 30+ mins 2 days opposite [place]  Readiness to change: Action:  (Changing behavior)      NUTRITION ASSESSMENT AND PLAN    Weight control:    Starting weight: 211 6 lbs    Current weight:   211 2 lbs   Waist circumference:    Startin in   Current:      Diabetes: N/A  A1c: 6 2    last measured: 9/20/22    Lipid management: Discussed diet and lipid management and Last lipid profile 9/20/22  Chol 180    HDL 42      Goals:reduced BMI to < 25, LDL <100, TRG <150, decreased body fat% <25%   (M), reduced waist circumference <40 inches (M), Improved Rate Your Plate score  >68, Wt  loss 1-2 ppw,  goal of 180-185 lbs , choose lean meat (93-95%), eliminate processed meats, reduce portion sizes of meat to 3oz or less, increase intake of fish, shellfish, cook without added fat or use vegetable oil/spray, increase intake of meatless meals, use low fat dairy, reduce cheese intake or use reduced-fat, eat 3 or more servings of whole grains a day, Eat 4-5 cups of fruits and vegetables daily, use olive or canola oil in baking, choose low sodium processed foods, eliminate butter, use fat-free dressings/antoine or seldom use, choose healthy snacks: light popcorn, plain pretzels, Increase intake of nuts and seeds, seldom eat or choose low fat ice-cream, fruit juice bars or frozen yogurt , eliminate or choose low-fat sweets, daily saturated fat intake <7%/13g and seldom eat out or choose lower fat menu items    Progression Toward Goals: Pt has not made progress toward the following goals: weight loss, having hard time avoiding sugar  , Patient will work on lowering sodium and sweets, decrease caffeine consumption, increase fruits/veggies in the next 30 days, Will continue to educate and progress as tolerated      Education: heart healthy eating  low sodium diet  hydration  nutrition for  lipid management  wt  loss   healthy choices while dining out  education class: Heart Healthy Eating  education class:  Label Reading  Plan: switch to low fat cheeses, replace butter with soft spreads made with olive oil, canola or yogurt, replace refined grain bread with whole grain bread, replace unhealthy snacks with fruits & vegs, reduce portion sizes, reduce red meat 1x/wk, switch to skim or 1% milk, eat fewer desserts and sweets, avoid processed foods, remove salt shaker from table, use salt substitute like Mrs  Dash, increase utilization of fresh or dried herbs, eat more home cooked meals and eat out less often, will try new grains like brown rice, quinoa, farro, will replace sugar sweetened cereals with whole grain or oatmeal, monitor home blood glucose, reduce alcohol intake, drink more water, learn how to read food labels, replace sugar with stevia or truvia, keep added daily sugar <25g/day and slow down eating time  Readiness to change: Action:  (Changing behavior)      PSYCHOSOCIAL ASSESSMENT AND PLAN    Emotional:  Depression assessment:  PHQ-9 = 3  1-4 = Minimal Depression            Anxiety measure:  LAKISHA-7 = 2  0-4  = Not anxious  Self-reported stress level:  3  Social support: Excellent and Patient reports excellent emotional/social support from family    Goals:  Reduce perceived stress to 1-3/10, improved [facility] QOL < 27, Physical Fitness in [facility] Score < 3, Pain in [facility] Score < 3, Increased interest in doing things, improved positive thoughts of well being, increased energy and Feel less anxious    Progression Toward Goals: Pt is progressing and showing improvement  toward the following goals:  improving PHQ-9 scores  , Patient will work on stress management, consider setting daily goals to help with motivation in the next 30 days, Will continue to educate and progress as tolerated      Education: signs/sxs of depression, benefits of a positive support system, stress management techniques, depression and CAD, benefits of mental health counseling, class:  Stress and Your Health  and class:  Relaxation  Plan: Refer to behavioral health/counseling, PHQ-9 >5 will refer to MD, Refer to [employer], Practice relaxation techniques, Exercise, Enjoy a hobby and Keep a positive mindset  Readiness to change: Action:  (Changing behavior)      OTHER CORE COMPONENTS     Tobacco:   Social History     Tobacco Use   Smoking Status Former   • Packs/day: 0 25   • Years: 30 00   • Pack years: 7 50   • Types: Cigarettes   • Start date: 5   • Quit date: 18   • Years since quittin 9   Smokeless Tobacco Never       Tobacco Use Intervention:   N/A: Pt has a remote history of smoking    Anginal Symptoms:  None   NTG use: No prescription    Blood pressure:    Restin/74 - 144/68   Exercise: 128/62 - 146/78     Goals: consistent BP < 130/80, reduced dietary sodium <2300mg, moderate intensity exercise >150 mins/wk and medication compliance    Progression Toward Goals: Pt is progressing and showing improvement  toward the following goals:  working on monitoring his BP and lowering it at rest   , Patient will monitor BP at home in the next 30 days, Will continue to educate and progress as tolerated      Education:  understanding high blood pressure and it's relationship to CAD and low sodium diet and HTN  Plan: Class: Understanding Heart Disease, Class: Common Heart Medications, Avoid Processed foods, use salt substitutes and check labels for sodium content  Readiness to change: Action:  (Changing behavior)

## 2022-12-22 ENCOUNTER — CLINICAL SUPPORT (OUTPATIENT)
Dept: CARDIAC REHAB | Facility: CLINIC | Age: 72
End: 2022-12-22

## 2022-12-22 DIAGNOSIS — Z95.5 STATUS POST INSERTION OF DRUG-ELUTING STENT INTO LEFT ANTERIOR DESCENDING (LAD) ARTERY: Primary | ICD-10-CM

## 2022-12-23 ENCOUNTER — APPOINTMENT (OUTPATIENT)
Dept: CARDIAC REHAB | Facility: CLINIC | Age: 72
End: 2022-12-23

## 2022-12-27 ENCOUNTER — CLINICAL SUPPORT (OUTPATIENT)
Dept: CARDIAC REHAB | Facility: CLINIC | Age: 72
End: 2022-12-27

## 2022-12-27 DIAGNOSIS — Z95.5 STATUS POST INSERTION OF DRUG-ELUTING STENT INTO LEFT ANTERIOR DESCENDING (LAD) ARTERY: Primary | ICD-10-CM

## 2022-12-29 ENCOUNTER — CLINICAL SUPPORT (OUTPATIENT)
Dept: CARDIAC REHAB | Facility: CLINIC | Age: 72
End: 2022-12-29

## 2022-12-29 DIAGNOSIS — Z95.5 STATUS POST INSERTION OF DRUG-ELUTING STENT INTO LEFT ANTERIOR DESCENDING (LAD) ARTERY: Primary | ICD-10-CM

## 2022-12-30 ENCOUNTER — CLINICAL SUPPORT (OUTPATIENT)
Dept: CARDIAC REHAB | Facility: CLINIC | Age: 72
End: 2022-12-30

## 2022-12-30 DIAGNOSIS — Z95.5 STATUS POST INSERTION OF DRUG-ELUTING STENT INTO LEFT ANTERIOR DESCENDING (LAD) ARTERY: Primary | ICD-10-CM

## 2023-01-03 ENCOUNTER — APPOINTMENT (OUTPATIENT)
Dept: CARDIAC REHAB | Facility: CLINIC | Age: 73
End: 2023-01-03

## 2023-01-03 ENCOUNTER — CLINICAL SUPPORT (OUTPATIENT)
Dept: CARDIAC REHAB | Facility: CLINIC | Age: 73
End: 2023-01-03

## 2023-01-03 DIAGNOSIS — Z95.5 STATUS POST INSERTION OF DRUG-ELUTING STENT INTO LEFT ANTERIOR DESCENDING (LAD) ARTERY: Primary | ICD-10-CM

## 2023-01-05 ENCOUNTER — CLINICAL SUPPORT (OUTPATIENT)
Dept: CARDIAC REHAB | Facility: CLINIC | Age: 73
End: 2023-01-05

## 2023-01-05 DIAGNOSIS — Z95.5 STATUS POST INSERTION OF DRUG-ELUTING STENT INTO LEFT ANTERIOR DESCENDING (LAD) ARTERY: Primary | ICD-10-CM

## 2023-01-06 ENCOUNTER — CLINICAL SUPPORT (OUTPATIENT)
Dept: CARDIAC REHAB | Facility: CLINIC | Age: 73
End: 2023-01-06

## 2023-01-06 DIAGNOSIS — Z95.5 STATUS POST INSERTION OF DRUG-ELUTING STENT INTO LEFT ANTERIOR DESCENDING (LAD) ARTERY: Primary | ICD-10-CM

## 2023-01-10 ENCOUNTER — CLINICAL SUPPORT (OUTPATIENT)
Dept: CARDIAC REHAB | Facility: CLINIC | Age: 73
End: 2023-01-10

## 2023-01-10 DIAGNOSIS — Z95.5 STATUS POST INSERTION OF DRUG-ELUTING STENT INTO LEFT ANTERIOR DESCENDING (LAD) ARTERY: Primary | ICD-10-CM

## 2023-01-12 ENCOUNTER — CLINICAL SUPPORT (OUTPATIENT)
Dept: CARDIAC REHAB | Facility: CLINIC | Age: 73
End: 2023-01-12

## 2023-01-12 DIAGNOSIS — Z95.5 STATUS POST INSERTION OF DRUG-ELUTING STENT INTO LEFT ANTERIOR DESCENDING (LAD) ARTERY: Primary | ICD-10-CM

## 2023-01-13 ENCOUNTER — APPOINTMENT (OUTPATIENT)
Dept: CARDIAC REHAB | Facility: CLINIC | Age: 73
End: 2023-01-13

## 2023-01-13 ENCOUNTER — HOSPITAL ENCOUNTER (OUTPATIENT)
Dept: CT IMAGING | Facility: HOSPITAL | Age: 73
End: 2023-01-13
Attending: INTERNAL MEDICINE

## 2023-01-13 DIAGNOSIS — R93.89 ABNORMAL CT OF THE CHEST: ICD-10-CM

## 2023-01-17 ENCOUNTER — CLINICAL SUPPORT (OUTPATIENT)
Dept: CARDIAC REHAB | Facility: CLINIC | Age: 73
End: 2023-01-17

## 2023-01-17 DIAGNOSIS — Z95.5 STATUS POST INSERTION OF DRUG-ELUTING STENT INTO LEFT ANTERIOR DESCENDING (LAD) ARTERY: Primary | ICD-10-CM

## 2023-01-19 ENCOUNTER — APPOINTMENT (OUTPATIENT)
Dept: CARDIAC REHAB | Facility: CLINIC | Age: 73
End: 2023-01-19

## 2023-01-20 ENCOUNTER — APPOINTMENT (OUTPATIENT)
Dept: CARDIAC REHAB | Facility: CLINIC | Age: 73
End: 2023-01-20

## 2023-01-20 ENCOUNTER — OFFICE VISIT (OUTPATIENT)
Dept: PULMONOLOGY | Facility: CLINIC | Age: 73
End: 2023-01-20

## 2023-01-20 ENCOUNTER — APPOINTMENT (OUTPATIENT)
Dept: LAB | Facility: HOSPITAL | Age: 73
End: 2023-01-20

## 2023-01-20 VITALS
HEART RATE: 51 BPM | SYSTOLIC BLOOD PRESSURE: 120 MMHG | HEIGHT: 72 IN | OXYGEN SATURATION: 96 % | WEIGHT: 214 LBS | DIASTOLIC BLOOD PRESSURE: 84 MMHG | TEMPERATURE: 98.1 F | BODY MASS INDEX: 28.99 KG/M2

## 2023-01-20 DIAGNOSIS — I10 BENIGN HYPERTENSION: ICD-10-CM

## 2023-01-20 DIAGNOSIS — R73.01 IMPAIRED FASTING GLUCOSE: ICD-10-CM

## 2023-01-20 DIAGNOSIS — R91.8 LUNG NODULE, MULTIPLE: Primary | ICD-10-CM

## 2023-01-20 DIAGNOSIS — R91.8 PULMONARY NODULES: ICD-10-CM

## 2023-01-20 DIAGNOSIS — R93.89 ABNORMAL CT OF THE CHEST: ICD-10-CM

## 2023-01-20 LAB
ALBUMIN SERPL BCP-MCNC: 3.5 G/DL (ref 3.5–5)
ALP SERPL-CCNC: 56 U/L (ref 46–116)
ALT SERPL W P-5'-P-CCNC: 24 U/L (ref 12–78)
ANION GAP SERPL CALCULATED.3IONS-SCNC: 7 MMOL/L (ref 4–13)
AST SERPL W P-5'-P-CCNC: 21 U/L (ref 5–45)
BASOPHILS # BLD AUTO: 0.03 THOUSANDS/ÂΜL (ref 0–0.1)
BASOPHILS NFR BLD AUTO: 0 % (ref 0–1)
BILIRUB SERPL-MCNC: 1.06 MG/DL (ref 0.2–1)
BUN SERPL-MCNC: 18 MG/DL (ref 5–25)
CALCIUM SERPL-MCNC: 9 MG/DL (ref 8.3–10.1)
CHLORIDE SERPL-SCNC: 105 MMOL/L (ref 96–108)
CHOLEST SERPL-MCNC: 206 MG/DL
CO2 SERPL-SCNC: 29 MMOL/L (ref 21–32)
CREAT SERPL-MCNC: 1.05 MG/DL (ref 0.6–1.3)
EOSINOPHIL # BLD AUTO: 0.13 THOUSAND/ÂΜL (ref 0–0.61)
EOSINOPHIL NFR BLD AUTO: 2 % (ref 0–6)
ERYTHROCYTE [DISTWIDTH] IN BLOOD BY AUTOMATED COUNT: 16.1 % (ref 11.6–15.1)
EST. AVERAGE GLUCOSE BLD GHB EST-MCNC: 123 MG/DL
GFR SERPL CREATININE-BSD FRML MDRD: 71 ML/MIN/1.73SQ M
GLUCOSE P FAST SERPL-MCNC: 109 MG/DL (ref 65–99)
HBA1C MFR BLD: 5.9 %
HCT VFR BLD AUTO: 46.3 % (ref 36.5–49.3)
HDLC SERPL-MCNC: 52 MG/DL
HGB BLD-MCNC: 15.6 G/DL (ref 12–17)
IMM GRANULOCYTES # BLD AUTO: 0.03 THOUSAND/UL (ref 0–0.2)
IMM GRANULOCYTES NFR BLD AUTO: 0 % (ref 0–2)
LDLC SERPL CALC-MCNC: 134 MG/DL (ref 0–100)
LYMPHOCYTES # BLD AUTO: 1.63 THOUSANDS/ÂΜL (ref 0.6–4.47)
LYMPHOCYTES NFR BLD AUTO: 21 % (ref 14–44)
MCH RBC QN AUTO: 31.1 PG (ref 26.8–34.3)
MCHC RBC AUTO-ENTMCNC: 33.7 G/DL (ref 31.4–37.4)
MCV RBC AUTO: 92 FL (ref 82–98)
MONOCYTES # BLD AUTO: 0.75 THOUSAND/ÂΜL (ref 0.17–1.22)
MONOCYTES NFR BLD AUTO: 9 % (ref 4–12)
NEUTROPHILS # BLD AUTO: 5.38 THOUSANDS/ÂΜL (ref 1.85–7.62)
NEUTS SEG NFR BLD AUTO: 68 % (ref 43–75)
NONHDLC SERPL-MCNC: 154 MG/DL
NRBC BLD AUTO-RTO: 0 /100 WBCS
PLATELET # BLD AUTO: 358 THOUSANDS/UL (ref 149–390)
PMV BLD AUTO: 10.1 FL (ref 8.9–12.7)
POTASSIUM SERPL-SCNC: 4.5 MMOL/L (ref 3.5–5.3)
PROT SERPL-MCNC: 6.6 G/DL (ref 6.4–8.4)
RBC # BLD AUTO: 5.01 MILLION/UL (ref 3.88–5.62)
SODIUM SERPL-SCNC: 141 MMOL/L (ref 135–147)
TRIGL SERPL-MCNC: 100 MG/DL
WBC # BLD AUTO: 7.95 THOUSAND/UL (ref 4.31–10.16)

## 2023-01-20 NOTE — PROGRESS NOTES
Cardiac Rehabilitation Plan of Care   90 Day Reassessment          Today's date: 2023   # of Exercise Sessions Completed: 31  Patient name: Dom Anderson      : 1950  Age: 81 y o  MRN: [id number]  Referring Physician: Pooja Delgado DO  Cardiologist: Dinora Guillaume MD  Provider: [facility]  Clinician: Rach Byrnes, MS, [facility]    Dx:   Encounter Diagnosis   Name Primary? • Status post insertion of drug-eluting stent into left anterior descending (LAD) artery Yes     Date of onset: 22      SUMMARY OF PROGRESS: Fern Milner is compliant attending cardiac rehab exercise sessions 3x/wk  He is attending post DESx1 to his Ost LAD  He was experiencing exertional tightness in his chest that led to cardiac workup  Nuclear stress test came up abnormal and Fritz was immediately sent to the ED for cath where 85% stenosis of LAD was found  He reports no CP since stenting and has been feeling better  He tolerates 40 mins at 4 23 - 4 33 METs plus wt training  He is tolerating progression of intensity levels to maintain RPE 4-6  Resting BP sometimes elevated,  110/76 - 164/80 with appropriate response to exercise reaching 130/80 - 160/82  His BP's at rest have fluctuated however are highly affected by his driving stress  Breathing techniques were reinforced with him  Sinus karen- NSR on tele with no ectopy observed  RHR 53 - 66 ExHR 92 - 103  He has obtained clearance to start going to the fitness center near him, however he has not formally begun consistent home exercise  Patient does report going for a 2 mile walk earlier in the week  He is encouraged to go to the gym and follow a program similar to what he does in [place], to obtain 150 mins/week  No cardiac complaints  He is not working towards a weight loss goal with a gain of 5 lbs  Fern Milner admits to not always following a heart healthy diet   Patient has been working on dietary modifications with the goal of rare red/processed meats, low fat dairy, reduced added sugars and refined flours  He is currently having a hard time avoiding sugar and sweets as his wife bakes a lot for her job  Fern Milner reports to have also switched to a salt alternative which he was encouraged to still use sparingly due to it being potassium based  The patient is a former smoker (quit 40 years ago)  PHQ-9 and LAKISHA-7 were not reassessed due to lower scoring at 30 day note  Most recent assessment found PHQ-9 at a 3 suggesting 1-4 = Minimal Depression and LAKISHA-7 at a 2 suggesting 0-4  = Not anxious  When addressed, the patient admits to sometimes having feelings of anxiety however denies therapy or further intervention at this time  He does report having a hard time getting motivated to do things around his house since retiring  He was encouraged to set daily goals to help give himself a schedule and a sense of accomplishment on a daily basis  Patient reports excellent social/emotional support from his family  Patient attends group educational classes on cardiac risk factor modification  His exercise program will be progressed as tolerated to maintain RPE 4-6  The patient has the following personal goals he hopes to achieved by discharge: weight loss with a goal of 180-185lbs, stay healthy for his grand kids, dietary changes, increase strength/endurance, improve feelings of depression  Pt will continue to be educated on lifestyle modifications and encouraged to supplement with a home exercise program to reach the following goals in the next 30 days: prepare for discharge by joining the gym, make dietary changes for weight loss, and work on stress management        Medication compliance: Yes   Comments: Pt reports to be compliant with medications  Fall Risk: Low   Comments: Ambulates with a steady gait with no assist device and recent fall off of laddar in the last 6 months irritating his rotator cuff    EKG Interpretation: sinus karen- NSR       EXERCISE ASSESSMENT and PLAN    Exercise Prescription:      Frequency: 3 days/week   Supplement with home exercise 2+ days/wk as tolerated       Minutes: 40         METS: 4 23-4 33           HR: 92 - 103     RPE: 4-5         Modalities: Treadmill, Airdyne bike, UBE, Lifecycle, NuStep and Recumbent bike     30 Day Goals for Exercise Progression:    Frequency: 3 days/week of cardiac rehab       Supplement with home exercise 2+ days/wk as tolerated    Minutes: 40-45      >150 mins/wk of moderate intensity exercise                         METS: 4 0-5 5   HR: resting + 30    RPE: 4-6   Modalities: Treadmill, Airdyne bike, UBE, Lifecycle and NuStep    Strength trainin-3 days / week  12-15 repetitions  1-2 sets per modality    Modalities: Leg Press, Chest Press, Pull Downs, Arm Curl and Seated Row    Home Exercise: light walking    Goals: 10% improvement in functional capacity - based on max METs achieved in fitness assessment, Increase in exercise capacity by 40% - based on peak METs tolerated in cardiac rehab exercise session, Exercise 5 days/wk, >150mins/wk of moderate intensity exercise, Resume ADLs with increased strength, Attend Rehab regularly, Start a walking program and return to regular exercise at the gym    Progression Toward Goals:  Pt is progressing and showing improvement  toward the following goals:  exercise tolerance and increased strength   , Pt has not made progress toward the following goals: consistent home exercise  , Patient will prepare for discharge by joining the fitness center in the next 30 days, Will continue to educate and progress as tolerated      Education: benefit of exercise for CAD risk factors, home exercise guidelines, AHA guidelines to achieve >150 mins/wk of moderate exercise, RPE scale, class: Risk Factors for Heart Disease and physical activity/exercise in extreme weather conditions   Plan:education on home exercise guidelines and home exercise 30+ mins 2 days opposite [place]  Readiness to change: Action:  (Changing behavior)      NUTRITION ASSESSMENT AND PLAN    Weight control:    Starting weight: 211 6 lbs    Current weight:   216 lbs   Waist circumference:    Startin in   Current:      Diabetes: N/A  A1c: 6 2    last measured: 22    Lipid management: Last lipid profile 23  Chol 206    HDL 52      Goals:reduced BMI to < 25, LDL <100, TRG <150, decreased body fat% <25%   (M), reduced waist circumference <40 inches (M), Improved Rate Your Plate score  >57, Wt  loss 1-2 ppw,  goal of 180-185 lbs , choose lean meat (93-95%), eliminate processed meats, reduce portion sizes of meat to 3oz or less, increase intake of fish, shellfish, cook without added fat or use vegetable oil/spray, increase intake of meatless meals, use low fat dairy, reduce cheese intake or use reduced-fat, eat 3 or more servings of whole grains a day, Eat 4-5 cups of fruits and vegetables daily, use olive or canola oil in baking, choose low sodium processed foods, eliminate butter, use fat-free dressings/antoine or seldom use, choose healthy snacks: light popcorn, plain pretzels, Increase intake of nuts and seeds, seldom eat or choose low fat ice-cream, fruit juice bars or frozen yogurt , eliminate or choose low-fat sweets, daily saturated fat intake <7%/13g and seldom eat out or choose lower fat menu items    Progression Toward Goals: Pt has not made progress toward the following goals: weight loss, no improvement in lipid panel, stuggling with added sugars  , Patient will work on lowering sodium and sweets, decrease caffeine consumption, increase fruits/veggies for weight loss in the next 30 days, Will continue to educate and progress as tolerated      Education: heart healthy eating  low sodium diet  hydration  nutrition for  lipid management  wt  loss   healthy choices while dining out  education class: Heart Healthy Eating  education class:  Label Reading  Plan: switch to low fat cheeses, replace butter with soft spreads made with olive oil, canola or yogurt, replace refined grain bread with whole grain bread, replace unhealthy snacks with fruits & vegs, reduce portion sizes, reduce red meat 1x/wk, switch to skim or 1% milk, eat fewer desserts and sweets, avoid processed foods, remove salt shaker from table, use salt substitute like Mrs  Dash, increase utilization of fresh or dried herbs, eat more home cooked meals and eat out less often, will try new grains like brown rice, quinoa, farro, will replace sugar sweetened cereals with whole grain or oatmeal, monitor home blood glucose, reduce alcohol intake, drink more water, learn how to read food labels, replace sugar with stevia or truvia, keep added daily sugar <25g/day and slow down eating time  Readiness to change: Action:  (Changing behavior)      PSYCHOSOCIAL ASSESSMENT AND PLAN    Emotional:  Depression assessment:  PHQ-9 = 3  1-4 = Minimal Depression            Anxiety measure:  LAKISHA-7 = 2  0-4  = Not anxious  Self-reported stress level:  3  Social support: Excellent and Patient reports excellent emotional/social support from family    Goals:  Reduce perceived stress to 1-3/10, improved [facility] QOL < 27, Physical Fitness in [facility] Score < 3, Pain in [facility] Score < 3, Increased interest in doing things, improved positive thoughts of well being, increased energy and Feel less anxious    Progression Toward Goals: Pt is progressing and showing improvement  toward the following goals:  improving PHQ-9 scores  , Patient will work on stress management, consider setting daily goals to help with motivation in the next 30 days, Will continue to educate and progress as tolerated      Education: signs/sxs of depression, benefits of a positive support system, stress management techniques, depression and CAD, benefits of mental health counseling, class:  Stress and Your Health  and class:  Relaxation  Plan: Refer to behavioral health/counseling, PHQ-9 >5 will refer to MD, Refer to [place], Practice relaxation techniques, Exercise, Enjoy a hobby and Keep a positive mindset  Readiness to change: Action:  (Changing behavior)      OTHER CORE COMPONENTS     Tobacco:   Social History     Tobacco Use   Smoking Status Former   • Packs/day: 0 25   • Years: 30 00   • Pack years: 7 50   • Types: Cigarettes   • Start date: 5   • Quit date:    • Years since quittin 0   Smokeless Tobacco Never       Tobacco Use Intervention:   N/A: Pt has a remote history of smoking    Anginal Symptoms:  None   NTG use: No prescription    Blood pressure:    Restin/76- 164/80   Exercise: 130/80- 160/82     Goals: consistent BP < 130/80, reduced dietary sodium <2300mg, moderate intensity exercise >150 mins/wk and medication compliance    Progression Toward Goals: Pt is progressing and showing improvement  toward the following goals:  working on monitoring his BP, drinking more water and watching sodium  , Pt has not made progress toward the following goals: BP is often elevated  , Patient will monitor BP at home in the next 30 days, Will continue to educate and progress as tolerated      Education:  understanding high blood pressure and it's relationship to CAD, low sodium diet and HTN and Education class:  Common Heart Medications  Plan: Class: Understanding Heart Disease, Avoid Processed foods, use salt substitutes and check labels for sodium content  Readiness to change: Action:  (Changing behavior)

## 2023-01-20 NOTE — PROGRESS NOTES
Assessment/Plan:   Diagnoses and all orders for this visit:    Lung nodule, multiple  -     CT chest without contrast; Future  -     Complete PFT with post bronchodilator; Future    Abnormal CT of the chest    Pulmonary nodules        The chest report and images were reviewed with the right lower lobe lung nodule which was 1 2 x 1 cm decreased 2 9 cm in the last CAT scan currently 7 mm  Another 5 mm lung nodule has completely resolved in the right lower lobe  All these nodules seem to be inflammatory consistently the size has decreased  Given very little smoking history risk for lung cancer is low and also no personal history of any malignancy  Will repeat CT of the chest in 1 year and follow-up for stability of the lung nodules  Also ordered a complete PFT given mild shortness of breath? ? Recent cardiac stent placed and also undergoing cardiac rehab he does not want us to schedule this 1 he states that if he still continues to have shortness of breath he will schedule it on his own  I will see him back in 1 year with the CT of the chest or as needed earlier as needed  Return in about 1 year (around 1/20/2024)  All questions are answered to the patient's satisfaction and understanding  He verbalizes understanding  He is encouraged to call with any further questions or concerns  Portions of the record may have been created with voice recognition software  Occasional wrong word or "sound a like" substitutions may have occurred due to the inherent limitations of voice recognition software  Read the chart carefully and recognize, using context, where substitutions have occurred      Electronically Signed by Dilia Duran MD    ______________________________________________________________________    Chief Complaint:   Chief Complaint   Patient presents with   • Follow-up       Patient ID: Chela Hernandez is a 81 y o  y o  male has a past medical history of Arthritis (2012), Depression (2012), Hyperlipidemia, and Hypertension  1/20/2023  Patient presents today for follow-up visit  Jyothi Casiano is a very pleasant 79-year-old gentleman, with less than 10 pack-year smoking history who quit approximately 40 years ago he states, works as a  in [employer], was scheduled for a surgery for torn rotator cuff had a preop chest x-ray done found to have opacities on the right lung for which a CT scan of the chest was ordered and he is here for follow-up  No family history of any lung cancer, he does not have any personal history of cancer diagnosed   A series of CAT scans have showed that the lung nodules are decreasing in size  Since his last visit he states he had a stent placed in his coronaries and is currently on Plavix and also following up with cardiac rehab,      Review of Systems   Constitutional: Negative  HENT: Negative  Eyes: Negative  Respiratory: Positive for shortness of breath  Cardiovascular: Negative  Gastrointestinal: Negative  Endocrine: Negative  Genitourinary: Negative  Musculoskeletal: Negative  Allergic/Immunologic: Negative  Neurological: Negative  Hematological: Negative  Psychiatric/Behavioral: Negative  Smoking history: He reports that he quit smoking about 41 years ago  His smoking use included cigarettes  He started smoking about 56 years ago  He has a 7 50 pack-year smoking history   He has never used smokeless tobacco     The following portions of the patient's history were reviewed and updated as appropriate: allergies, current medications, past family history, past medical history, past social history, past surgical history and problem list     Immunization History   Administered Date(s) Administered   • COVID-19 J&J (Keely) vaccine 0 5 mL 03/10/2021, 03/10/2021   • INFLUENZA 12/04/2001, 11/08/2002, 10/14/2022   • Influenza Quadrivalent, 6-35 Months IM 11/18/2015   • Influenza, high dose seasonal 0 7 mL 10/19/2018, 02/10/2021, 12/10/2021, 10/14/2022   • Pneumococcal Conjugate 13-Valent 07/08/2019   • Pneumococcal Polysaccharide PPV23 07/28/2020   • Tdap 07/19/2020     Current Outpatient Medications   Medication Sig Dispense Refill   • aspirin 81 MG tablet Take 81 mg by mouth daily     • atenolol (TENORMIN) 50 mg tablet TAKE 1 TABLET DAILY 90 tablet 3   • clopidogrel (PLAVIX) 75 mg tablet Take 1 tablet (75 mg total) by mouth daily 90 tablet 1   • esomeprazole (NexIUM) 20 mg capsule Take 20 mg by mouth every morning before breakfast     • FLUoxetine (PROzac) 20 mg capsule TAKE 1 CAPSULE BY MOUTH EVERY DAY 90 capsule 3   • losartan (COZAAR) 25 mg tablet Take 1 tablet (25 mg total) by mouth daily 90 tablet 1   • multivitamin (THERAGRAN) TABS Take 1 tablet by mouth daily     • pravastatin (PRAVACHOL) 40 mg tablet Take 1 tablet (40 mg total) by mouth daily with dinner 90 tablet 1   • tamsulosin (FLOMAX) 0 4 mg TAKE 1 CAPSULE BY MOUTH IN THE MORNING 30 capsule 5     No current facility-administered medications for this visit  Allergies: Amlodipine and Lisinopril    Objective:  Vitals:    01/20/23 0840   BP: 120/84   Pulse: (!) 51   Temp: 98 1 °F (36 7 °C)   SpO2: 96%   Weight: 97 1 kg (214 lb)   Height: 6' (1 829 m)   Oxygen Therapy  SpO2: 96 %    Wt Readings from Last 3 Encounters:   01/20/23 97 1 kg (214 lb)   11/21/22 98 4 kg (217 lb)   10/18/22 99 4 kg (219 lb 3 2 oz)     Body mass index is 29 02 kg/m²  Physical Exam  Vitals and nursing note reviewed  Constitutional:       Appearance: He is well-developed  HENT:      Head: Normocephalic and atraumatic  Eyes:      Conjunctiva/sclera: Conjunctivae normal       Pupils: Pupils are equal, round, and reactive to light  Neck:      Thyroid: No thyromegaly  Vascular: No JVD  Cardiovascular:      Rate and Rhythm: Normal rate and regular rhythm  Heart sounds: Normal heart sounds  No murmur heard  No friction rub  No gallop     Pulmonary:      Effort: Pulmonary effort is normal  No respiratory distress  Breath sounds: Normal breath sounds  No wheezing or rales  Chest:      Chest wall: No tenderness  Musculoskeletal:         General: No tenderness or deformity  Normal range of motion  Cervical back: Normal range of motion and neck supple  Lymphadenopathy:      Cervical: No cervical adenopathy  Skin:     General: Skin is warm and dry  Neurological:      Mental Status: He is alert and oriented to person, place, and time  Diagnostics:  I have personally reviewed pertinent films in PACS  CT chest without contrast    Result Date: 1/19/2023  Narrative: CT CHEST WITHOUT IV CONTRAST INDICATION:   R93 89: Abnormal findings on diagnostic imaging of other specified body structures  COMPARISON:  CT chest 7/8/2022 and additional priors  TECHNIQUE: CT examination of the chest was performed without intravenous contrast  Axial, sagittal, and coronal 2D reformatted images were created from the source data and submitted for interpretation  Radiation dose length product (DLP) for this visit:  358 mGy-cm   This examination, like all CT scans performed in the [facility], was performed utilizing techniques to minimize radiation dose exposure, including the use of iterative reconstruction and automated exposure control  FINDINGS: LUNGS:  Scattered pulmonary nodules again noted  Less dense and smaller right lower lobe nodule anteriorly bordering the major fissure now 7 mm image 74 series 3, previously 9 mm  Previously noted 5 mm right lower lobe nodule posteriorly has nearly resolved, image 69 series 3, previously 4 mm  Smaller 5 mm right lower lobe subpleural nodule, image 62 series 3, previously 8 mm  Slightly smaller 2 mm nodule remaining in the left lower lobe close to the oblique fissure, image 57 series 3, previously 3 mm  Scattered calcified granuloma again seen  No new suspicious nodules or masses  Trachea and bronchi are patent  PLEURA:  Unremarkable   HEART/GREAT VESSELS: Heart is normal size  Scattered atherosclerotic calcifications  No thoracic aortic aneurysm  MEDIASTINUM AND JESSE:  Unremarkable  CHEST WALL AND LOWER NECK:  Unremarkable  VISUALIZED STRUCTURES IN THE UPPER ABDOMEN:  Unremarkable  OSSEOUS STRUCTURES:  No acute fracture or destructive osseous lesion  Stable moderate anterior wedging at T12  Scattered old healed rib fractures on the right  Impression: 1  Scattered subcentimeter pulmonary nodules again noted, overall smaller from the prior exam   Findings would suggest a postinfectious or inflammatory process  At this point a follow-up chest CT could be performed in 6-12 months   Workstation performed: [id number]

## 2023-01-24 ENCOUNTER — OFFICE VISIT (OUTPATIENT)
Dept: CARDIOLOGY CLINIC | Facility: CLINIC | Age: 73
End: 2023-01-24

## 2023-01-24 ENCOUNTER — APPOINTMENT (OUTPATIENT)
Dept: CARDIAC REHAB | Facility: CLINIC | Age: 73
End: 2023-01-24

## 2023-01-24 VITALS
BODY MASS INDEX: 28.85 KG/M2 | HEIGHT: 72 IN | DIASTOLIC BLOOD PRESSURE: 78 MMHG | OXYGEN SATURATION: 98 % | WEIGHT: 213 LBS | SYSTOLIC BLOOD PRESSURE: 128 MMHG | RESPIRATION RATE: 16 BRPM | HEART RATE: 60 BPM

## 2023-01-24 DIAGNOSIS — Z95.5 STATUS POST INSERTION OF DRUG-ELUTING STENT INTO LEFT ANTERIOR DESCENDING (LAD) ARTERY: ICD-10-CM

## 2023-01-24 DIAGNOSIS — I20.8 STABLE ANGINA (HCC): ICD-10-CM

## 2023-01-24 DIAGNOSIS — E78.00 PURE HYPERCHOLESTEROLEMIA: ICD-10-CM

## 2023-01-24 DIAGNOSIS — R73.01 IMPAIRED FASTING GLUCOSE: ICD-10-CM

## 2023-01-24 DIAGNOSIS — I25.10 CORONARY ARTERY DISEASE INVOLVING NATIVE CORONARY ARTERY OF NATIVE HEART WITHOUT ANGINA PECTORIS: Primary | ICD-10-CM

## 2023-01-24 RX ORDER — ATORVASTATIN CALCIUM 40 MG/1
40 TABLET, FILM COATED ORAL DAILY
Qty: 30 TABLET | Refills: 6 | Status: SHIPPED | OUTPATIENT
Start: 2023-01-24

## 2023-01-24 NOTE — PROGRESS NOTES
Cardiology Follow Up    Garima Dolan  1950  [id number]  [facility] [address] [place] [address] [place] [id number] [phone]        Interval History:The patient is a 68-year-old male who presented with angina, dyspnea on exertion and an abnormal stress test   September 23, 2022 he underwent cardiac catheterization which revealed single-vessel CAD of the ostial LAD and he underwent successful 4 0/15 mm Xience V drug-eluting stent to the ostial LAD  Piecemeal ready  A TTE also performed at that time revealed normal systolic function with an EF 55%, no regional wall motion abnormalities were noted  EKG also revealed sinus rhythm with first-degree AV block  He feels well and has been doing cardiac rehab with no symptoms  He continues on dual antiplatelet therapy with aspirin and Plavix  He has a history of pulmonary nodules, depression, hypertension and hyperlipidemia      Patient Active Problem List   Diagnosis   • Complete tear of left rotator cuff   • Biceps tendinitis   • Benign hypertension   • Allergic rhinitis   • Hyperlipidemia   • Depression   • Impaired fasting glucose   • Vitamin D deficiency   • Bilateral primary osteoarthritis of knee   • Primary osteoarthritis of right knee   • Primary osteoarthritis of left knee   • Chronic venous insufficiency   • Stable angina ([address] )   • Coronary artery disease involving native coronary artery of native heart without angina pectoris   • Status post insertion of drug-eluting stent into left anterior descending (LAD) artery     Past Medical History:   Diagnosis Date   • Arthritis 2012   • Depression 2012   • Hyperlipidemia    • Hypertension      Social History     Socioeconomic History   • Marital status: /Civil Union     Spouse name: Not on file   • Number of children: Not on file   • Years of education: Not on file   • Highest education level: Not on file   Occupational History   • Occupation: Part time   Tobacco Use   • Smoking status: Former     Packs/day: 0 25     Years: 30 00     Pack years: 7 50     Types: Cigarettes     Start date: 5     Quit date:      Years since quittin 0   • Smokeless tobacco: Never   Vaping Use   • Vaping Use: Never used   Substance and Sexual Activity   • Alcohol use: Not Currently     Alcohol/week: 0 0 standard drinks     Comment: social   • Drug use: No   • Sexual activity: Yes     Partners: Female     Birth control/protection: Condom Male     Comment: Denied high risk sexual behavior   Other Topics Concern   • Not on file   Social History Narrative   • Not on file     Social Determinants of Health     Financial Resource Strain: Not on file   Food Insecurity: Not on file   Transportation Needs: Not on file   Physical Activity: Not on file   Stress: Not on file   Social Connections: Not on file   Intimate Partner Violence: Not on file   Housing Stability: Not on file      Family History   Problem Relation Age of Onset   • Cancer Father         Brain tumor   • Heart disease Father    • Hyperlipidemia Father    • Hypertension Father    • Brain cancer Father    • Migraines Mother    • Cancer Brother         Melanoma     Past Surgical History:   Procedure Laterality Date   • ABDOMINAL SURGERY     • CARDIAC CATHETERIZATION N/A 2022    Procedure: Cardiac catheterization;  Surgeon: Tawny Chavira MD;  Location: [facility]; Service: Cardiology   • CARDIAC CATHETERIZATION N/A 2022    Procedure: Cardiac Coronary Angiogram;  Surgeon: Tawny Chavira MD;  Location: [address] [place] [address] CATH LAB; Service: Cardiology   • CARDIAC CATHETERIZATION Left 2022    Procedure: Cardiac Left Heart Cath;  Surgeon: Tawny Chavira MD;  Location: [facility];   Service: Cardiology   • CARDIAC CATHETERIZATION N/A 2022    Procedure: Cardiac pci;  Surgeon: Tawny Chavira MD;  Location: [address] [place] [address] LAB;  Service: Cardiology   • KNEE ARTHROSCOPY     • NASAL SINUS SURGERY     • MS SURGICAL ARTHROSCOPY SHOULDER BICEPS TENODESIS Right 1/25/2017    Procedure: ARTHROSCOPIC BICEPS TENODESIS;  Surgeon: Katharina Alonso MD;  Location: [facility];  Service: Orthopedics   • MS SURGICAL ARTHROSCOPY SHOULDER W/ROTATOR CUFF RPR Right 1/25/2017    Procedure: SHOULDER ARTHROSCOPY ROTATOR CUFF REPAIR ;  Surgeon: Katharina Alonso MD;  Location: [facility];  Service: Orthopedics   • MS SURGICAL ARTHROSCOPY Leighann Layne DBRDMT 3+ Right 1/25/2017    Procedure: ARTHROSCOPY SHOULDER;  Surgeon: Katharina Alonso MD;  Location: [facility];  Service: Orthopedics   • TONSILLECTOMY         Current Outpatient Medications:   •  aspirin 81 MG tablet, Take 81 mg by mouth daily, Disp: , Rfl:   •  atenolol (TENORMIN) 50 mg tablet, TAKE 1 TABLET DAILY, Disp: 90 tablet, Rfl: 3  •  clopidogrel (PLAVIX) 75 mg tablet, Take 1 tablet (75 mg total) by mouth daily, Disp: 90 tablet, Rfl: 1  •  esomeprazole (NexIUM) 20 mg capsule, Take 20 mg by mouth every morning before breakfast, Disp: , Rfl:   •  FLUoxetine (PROzac) 20 mg capsule, TAKE 1 CAPSULE BY MOUTH EVERY DAY, Disp: 90 capsule, Rfl: 3  •  losartan (COZAAR) 25 mg tablet, Take 1 tablet (25 mg total) by mouth daily, Disp: 90 tablet, Rfl: 1  •  multivitamin (THERAGRAN) TABS, Take 1 tablet by mouth daily, Disp: , Rfl:   •  pravastatin (PRAVACHOL) 40 mg tablet, Take 1 tablet (40 mg total) by mouth daily with dinner, Disp: 90 tablet, Rfl: 1  •  tamsulosin (FLOMAX) 0 4 mg, TAKE 1 CAPSULE BY MOUTH IN THE MORNING, Disp: 30 capsule, Rfl: 5  Allergies   Allergen Reactions   • Amlodipine Swelling   • Lisinopril      Other reaction(s): Cough  Other reaction(s): Cough       Labs:  Appointment on 01/20/2023   Component Date Value   • Sodium 01/20/2023 141    • Potassium 01/20/2023 4 5    • Chloride 01/20/2023 105    • CO2 01/20/2023 29    • ANION GAP 01/20/2023 7    • BUN 01/20/2023 18    • Creatinine 01/20/2023 1 05    • Glucose, Fasting 01/20/2023 109 (H)    • Calcium 01/20/2023 9 0    • AST 01/20/2023 21    • ALT 01/20/2023 24    • Alkaline Phosphatase 01/20/2023 56    • Total Protein 01/20/2023 6 6    • Albumin 01/20/2023 3 5    • Total Bilirubin 01/20/2023 1 06 (H)    • eGFR 01/20/2023 71    • WBC 01/20/2023 7 95    • RBC 01/20/2023 5 01    • Hemoglobin 01/20/2023 15 6    • Hematocrit 01/20/2023 46 3    • MCV 01/20/2023 92    • MCH 01/20/2023 31 1    • MCHC 01/20/2023 33 7    • RDW 01/20/2023 16 1 (H)    • MPV 01/20/2023 10 1    • Platelets 68/07/2102 358    • nRBC 01/20/2023 0    • Neutrophils Relative 01/20/2023 68    • Immat GRANS % 01/20/2023 0    • Lymphocytes Relative 01/20/2023 21    • Monocytes Relative 01/20/2023 9    • Eosinophils Relative 01/20/2023 2    • Basophils Relative 01/20/2023 0    • Neutrophils Absolute 01/20/2023 5 38    • Immature Grans Absolute 01/20/2023 0 03    • Lymphocytes Absolute 01/20/2023 1 63    • Monocytes Absolute 01/20/2023 0 75    • Eosinophils Absolute 01/20/2023 0 13    • Basophils Absolute 01/20/2023 0 03    • Hemoglobin A1C 01/20/2023 5 9 (H)    • EAG 01/20/2023 123    • Cholesterol 01/20/2023 206 (H)    • Triglycerides 01/20/2023 100    • HDL, Direct 01/20/2023 52    • LDL Calculated 01/20/2023 134 (H)    • Non-HDL-Chol (CHOL-HDL) 01/20/2023 154      Imaging: CT chest without contrast    Result Date: 1/19/2023  Narrative: CT CHEST WITHOUT IV CONTRAST INDICATION:   R93 89: Abnormal findings on diagnostic imaging of other specified body structures  COMPARISON:  CT chest 7/8/2022 and additional priors  TECHNIQUE: CT examination of the chest was performed without intravenous contrast  Axial, sagittal, and coronal 2D reformatted images were created from the source data and submitted for interpretation  Radiation dose length product (DLP) for this visit:  358 mGy-cm     This examination, like all CT scans performed in the [place], was performed utilizing techniques to minimize radiation dose exposure, including the use of iterative reconstruction and automated exposure control  FINDINGS: LUNGS:  Scattered pulmonary nodules again noted  Less dense and smaller right lower lobe nodule anteriorly bordering the major fissure now 7 mm image 74 series 3, previously 9 mm  Previously noted 5 mm right lower lobe nodule posteriorly has nearly resolved, image 69 series 3, previously 4 mm  Smaller 5 mm right lower lobe subpleural nodule, image 62 series 3, previously 8 mm  Slightly smaller 2 mm nodule remaining in the left lower lobe close to the oblique fissure, image 57 series 3, previously 3 mm  Scattered calcified granuloma again seen  No new suspicious nodules or masses  Trachea and bronchi are patent  PLEURA:  Unremarkable  HEART/GREAT VESSELS: Heart is normal size  Scattered atherosclerotic calcifications  No thoracic aortic aneurysm  MEDIASTINUM AND JESSE:  Unremarkable  CHEST WALL AND LOWER NECK:  Unremarkable  VISUALIZED STRUCTURES IN THE UPPER ABDOMEN:  Unremarkable  OSSEOUS STRUCTURES:  No acute fracture or destructive osseous lesion  Stable moderate anterior wedging at T12  Scattered old healed rib fractures on the right  Impression: 1  Scattered subcentimeter pulmonary nodules again noted, overall smaller from the prior exam   Findings would suggest a postinfectious or inflammatory process  At this point a follow-up chest CT could be performed in 6-12 months  Workstation performed: [id number]       Review of Systems:  Review of Systems review of systems is negative  Physical Exam:  Physical Exam   GEN: Alert and oriented x 3, in no acute distress  Well appearing and well nourished  HEENT: Sclera anicteric, conjunctivae pink, mucous membranes moist  Oropharynx clear  NECK: Supple, no carotid bruits, no significant JVD  Trachea midline, no thyromegaly  HEART: Regular rhythm, normal S1 and S2, no murmurs, clicks, gallops or rubs   PMI nondisplaced, no thrills  LUNGS: Clear to auscultation bilaterally; no wheezes, rales, or rhonchi  No increased work of breathing or signs of respiratory distress  ABDOMEN: Soft, nontender, nondistended, normoactive bowel sounds  EXTREMITIES: Skin warm and well perfused, no clubbing, cyanosis, 1+ edema  NEURO: No focal findings  Normal speech  Mood and affect normal    SKIN: Normal without suspicious lesions on exposed skin  CAD status post drug-eluting stent to the ostial LAD September 2022  Hypertension  Hypercholesterolemia  Depression      Discussion/Summary: He is doing great, asymptomatic from a cardiovascular standpoint status post coronary stenting  Continue aspirin and Plavix a minimum of 1 year if tolerated  Continue amlodipine, atorvastatin, carvedilol, chlorthalidone, lisinopril and diabetic meds  Continue exercise program  He does get a burning sensation when starting on the treadmill althoug resolves with cotinuing exercise  We discussed possible stress testing which he defers at this time  He will call if he develops further symptoms  His LDL was 134 and therefore we will discontinue pravastatin and start atorvastatin 40 mg a day  All questions answered

## 2023-01-26 ENCOUNTER — APPOINTMENT (OUTPATIENT)
Dept: CARDIAC REHAB | Facility: CLINIC | Age: 73
End: 2023-01-26

## 2023-01-27 ENCOUNTER — CLINICAL SUPPORT (OUTPATIENT)
Dept: CARDIAC REHAB | Facility: CLINIC | Age: 73
End: 2023-01-27

## 2023-01-27 DIAGNOSIS — Z95.5 STATUS POST INSERTION OF DRUG-ELUTING STENT INTO LEFT ANTERIOR DESCENDING (LAD) ARTERY: Primary | ICD-10-CM

## 2023-01-31 ENCOUNTER — CLINICAL SUPPORT (OUTPATIENT)
Dept: CARDIAC REHAB | Facility: CLINIC | Age: 73
End: 2023-01-31

## 2023-01-31 DIAGNOSIS — Z95.5 STATUS POST INSERTION OF DRUG-ELUTING STENT INTO LEFT ANTERIOR DESCENDING (LAD) ARTERY: Primary | ICD-10-CM

## 2023-02-02 ENCOUNTER — CLINICAL SUPPORT (OUTPATIENT)
Dept: CARDIAC REHAB | Facility: CLINIC | Age: 73
End: 2023-02-02

## 2023-02-02 DIAGNOSIS — Z95.5 STATUS POST INSERTION OF DRUG-ELUTING STENT INTO LEFT ANTERIOR DESCENDING (LAD) ARTERY: Primary | ICD-10-CM

## 2023-02-03 ENCOUNTER — CLINICAL SUPPORT (OUTPATIENT)
Dept: CARDIAC REHAB | Facility: CLINIC | Age: 73
End: 2023-02-03

## 2023-02-03 DIAGNOSIS — Z95.5 STATUS POST INSERTION OF DRUG-ELUTING STENT INTO LEFT ANTERIOR DESCENDING (LAD) ARTERY: Primary | ICD-10-CM

## 2023-02-07 ENCOUNTER — CLINICAL SUPPORT (OUTPATIENT)
Dept: CARDIAC REHAB | Facility: CLINIC | Age: 73
End: 2023-02-07

## 2023-02-07 ENCOUNTER — OFFICE VISIT (OUTPATIENT)
Dept: INTERNAL MEDICINE CLINIC | Facility: CLINIC | Age: 73
End: 2023-02-07

## 2023-02-07 VITALS
SYSTOLIC BLOOD PRESSURE: 136 MMHG | HEIGHT: 72 IN | HEART RATE: 59 BPM | OXYGEN SATURATION: 98 % | RESPIRATION RATE: 12 BRPM | WEIGHT: 214 LBS | BODY MASS INDEX: 28.99 KG/M2 | DIASTOLIC BLOOD PRESSURE: 70 MMHG

## 2023-02-07 DIAGNOSIS — M79.89 LEFT LEG SWELLING: Primary | ICD-10-CM

## 2023-02-07 DIAGNOSIS — Z95.5 STATUS POST INSERTION OF DRUG-ELUTING STENT INTO LEFT ANTERIOR DESCENDING (LAD) ARTERY: Primary | ICD-10-CM

## 2023-02-07 NOTE — PROGRESS NOTES
Cardiac Rehabilitation Plan of Care   Discharge        Today's date: 2023   # of Exercise Sessions Completed: 36  Patient name: Mary Jauregui      : 1950  Age: 81 y o  MRN: [id number]  Referring Physician: Brenda Mitchell DO  Cardiologist: Rene Perez MD  Provider: [facility]  Clinician: Mary Monique MS, CEP    Dx:   Encounter Diagnosis   Name Primary? • Status post insertion of drug-eluting stent into left anterior descending (LAD) artery Yes     Date of onset: 22      SUMMARY OF PROGRESS: Discharge note for Fritz  He attended post DESx1 to his Ost LAD  He was experiencing exertional tightness in his chest that led to cardiac workup  Nuclear stress test came up abnormal and Fritz was immediately sent to the ED for cath where 85% stenosis of LAD was found  He reports no CP since stenting and has been feeling better  He had 0% improvement in functional capacity maintaining his max METs in the submaximal TM ETT at 5 8 with test termination of RHR +30  His exercise tolerance (max METs in tolerated in cardiac rehab) increased by 25% from 3 6 to 4 5 METs  He had a 0% improvement in the DUKE activity estimated MET level with ADLs and physical activity having returned to all activity post PCI  His [facility] QOL improved by 18 8%  Nadeem Rogers reports that he has returned to most of his activities he was doing originally such as wood working  PHQ-9 score decreased from 6 to 2 suggesting minimal depression  LAKISHA-7 increased from 0 to 2 suggesting no anxiety even though it went up  His weight increased by 2 pounds  Waist circumference decreased by 5 inches  Rate Your Plate score improved from 41 to 44  He reports increased stamina, strength and reduced SOB with activity  Fritz tolerates 40 mins at 2 4 - 4 5 METs plus wt training  NSR on telemetry  RHR 54 - 64, ExHR 92 - 111  Resting BP occasionally high at 120/84 - 162/84 with appropriate response to exercise reaching 118/70 - 158/72   It does drop to normal numbers when he comes in elevated suggesting not a relaxed BP at rest  All group education classes on cardiac risk factor modification were attended by the patient  Discharge plans include joining a local fitness center by him  Encouraged Pt to continue exercise at Frequency: 4-6 days/wk, Intensity: RPE 4-5, Time: 40-50 mins daily, 150-200 mins/wk  Pt was encouraged to continue eating heart healthy and focus on making 1-2 changes at a time for adherence  Pt was encouraged to remain compliant with medications and f/u with cardiologist with any cardiac symptoms, medication management and updated lipid profile         Medication compliance: Yes   Comments: Pt reports to be compliant with medications  Fall Risk: Low   Comments: Ambulates with a steady gait with no assist device and recent fall off of laddar in the last 6 months irritating his rotator cuff    EKG Interpretation: sinus karen- NSR at rest       EXERCISE ASSESSMENT and PLAN    Exercise Prescription:      Frequency: 3 days/week   Supplement with home exercise 2+ days/wk as tolerated       Minutes: 40         METS: 2 5-4 5           HR: 92 - 111    RPE: 4-5         Modalities: Treadmill, Airdyne bike, UBE, Lifecycle, NuStep and Recumbent bike     Exercise Progression after Discharge:    Frequency: 3-5 days of gym or home exercise   Minutes: 30-50  >150 mins/wk of moderate intensity exercise   METS: 3 - 5   HR: 80 - 110    RPE: 4-6   Modalities: Treadmill, Airdyne bike, UBE, Lifecycle, NuStep and Recumbent bike    Strength trainin-3 days / week  12-15 repetitions  1-2 sets per modality    Modalities: Leg Press and Arm Curl    Home Exercise: light walking    Goals: Exercise 5 days/wk, >150mins/wk of moderate intensity exercise, Resume ADLs with increased strength and return to regular exercise at the gym    Progression Toward Goals:  Goals not met: no change in max METs   , Goals met: increased peak METs, increased daily exercise, resumed hobbies, joining gym , Patient will be encouraged to focus on lifestyle modifications following discharge  Education: benefit of exercise for CAD risk factors, home exercise guidelines, AHA guidelines to achieve >150 mins/wk of moderate exercise, RPE scale, class: Risk Factors for Heart Disease, exercise instructions/guidelines for discharge  and physical activity/exercise in extreme weather conditions   Plan:education on home exercise guidelines  Readiness to change: Maintenance: (Maintaining the behavior change)      NUTRITION ASSESSMENT AND PLAN    Weight control:    Starting weight: 211 6 lbs    Current weight:   213 lbs   Waist circumference:    Startin in   Current:      Diabetes: N/A  A1c: 5 9    last measured: 23    Lipid management: Last lipid profile 23  Chol 206    HDL 52      Goals:reduced BMI to < 25, LDL <100, CHOL <200, decreased body fat% <25%   (M), reduced waist circumference <40 inches (M), Improved Rate Your Plate score  >81, Wt  loss 1-2 ppw,  goal of 180-185 lbs , choose lean meat (93-95%), reduce portion sizes of meat to 3oz or less, increase intake of fish, shellfish, cook without added fat or use vegetable oil/spray, use low fat dairy, reduce cheese intake or use reduced-fat, Eat 4-5 cups of fruits and vegetables daily, choose low sodium processed foods, choose healthy snacks: light popcorn, plain pretzels, Increase intake of nuts and seeds, seldom eat or choose low fat ice-cream, fruit juice bars or frozen yogurt , eliminate or choose low-fat sweets, daily saturated fat intake <7%/13g and seldom eat out or choose lower fat menu items    Progression Toward Goals: Goals not met: weight loss   , Goals met: improved rate your plate, lowered waist circumference, lowered caffeine intake , Patient will be encouraged to focus on lifestyle modifications following discharge  Education: heart healthy eating  low sodium diet  hydration  nutrition for  lipid management  wt  loss   healthy choices while dining out  education class: Heart Healthy Eating  education class:  Label Reading  Plan: replace refined grain bread with whole grain bread, replace unhealthy snacks with fruits & vegs, reduce red meat 1x/wk, switch to skim or 1% milk, eat fewer desserts and sweets, avoid processed foods, remove salt shaker from table, use salt substitute like Mrs  Dash, increase utilization of fresh or dried herbs, eat more home cooked meals and eat out less often, will try new grains like brown rice, quinoa, farro and drink more water  Readiness to change: Maintenance: (Maintaining the behavior change)      PSYCHOSOCIAL ASSESSMENT AND PLAN    Emotional:  Depression assessment:  PHQ-9 = 2  1-4 = Minimal Depression            Anxiety measure:  LAKISHA-7 = 2  0-4  = Not anxious  Self-reported stress level:  3  Social support: Excellent and Patient reports excellent emotional/social support from family    Goals:  Reduce perceived stress to 1-3/10, improved [facility] QOL < 27, Physical Fitness in [facility] Score < 3, Increased interest in doing things, increased energy and Feel less anxious    Progression Toward Goals: Pt is progressing and showing improvement  toward the following goals:  improving PHQ-9 scores  , Patient will work on stress management, consider setting daily goals to help with motivation in the next 30 days, Will continue to educate and progress as tolerated      Education: signs/sxs of depression, benefits of a positive support system, stress management techniques, depression and CAD, benefits of mental health counseling, class:  Stress and Your Health  and class:  Relaxation  Plan: Refer to behavioral health/counseling, PHQ-9 >5 will refer to MD, Refer to [employer], Practice relaxation techniques, Exercise, Enjoy a hobby and Keep a positive mindset  Readiness to change: Maintenance: (Maintaining the behavior change)      OTHER CORE COMPONENTS     Tobacco:   Social History     Tobacco Use   Smoking Status Former   • Packs/day: 0 25   • Years: 30 00   • Pack years: 7 50   • Types: Cigarettes   • Start date: 5   • Quit date:    • Years since quittin 1   Smokeless Tobacco Never       Tobacco Use Intervention:   N/A: Pt has a remote history of smoking    Anginal Symptoms:  None   NTG use: No prescription    Blood pressure:    Restin/684 - 162/84    Exercise: 118/70 - 158/72     Goals: consistent BP < 130/80, reduced dietary sodium <2300mg, moderate intensity exercise >150 mins/wk and medication compliance    Progression Toward Goals: Goals not met: BP still elevated at rest   , Goals met: maintained med compliance, has BP cuff to monitor BP at home , Patient will be encouraged to focus on lifestyle modifications following discharge  , will work on decreasing BP and monitoring at home    Education:  understanding high blood pressure and it's relationship to CAD, low sodium diet and HTN, Education class:  Common Heart Medications and Education class: Understanding Heart Disease  Plan: Avoid Processed foods, use salt substitutes and check labels for sodium content  Readiness to change: Maintenance: (Maintaining the behavior change)

## 2023-02-07 NOTE — PROGRESS NOTES
Assessment/Plan:   Patient Instructions   Will send patient for stat venous ultrasound of the left leg  Further disposition pending results  Quality Measures: BMI Counseling: Body mass index is 29 02 kg/m²  The BMI is above normal  Nutrition recommendations include decreasing portion sizes, encouraging healthy choices of fruits and vegetables, consuming healthier snacks, moderation in carbohydrate intake and reducing intake of cholesterol  Exercise recommendations include exercising 3-5 times per week  Rationale for BMI follow-up plan is due to patient being overweight or obese  Return if symptoms worsen or fail to improve, for Next scheduled follow up  Diagnoses and all orders for this visit:    Left leg swelling  -     VAS lower limb venous duplex study, unilateral/limited; Future        Subjective:      Patient ID: Essence Kingston is a 81 y o  male  Acute visit    Swelling to left leg x2 weeks  Patient reports that approximately 2 weeks ago he noted that his ankle started swelling, the swelling then progressed to the level of his mid calf  He denies any pain, occasional shortness of breath but no chest pain  He is status post FRANCISCO J insertion in September  He has been on Plavix and aspirin  Denies trauma to the area        ALLERGIES:  Allergies   Allergen Reactions   • Amlodipine Swelling   • Lisinopril      Other reaction(s): Cough  Other reaction(s): Cough       CURRENT MEDICATIONS:    Current Outpatient Medications:   •  aspirin 81 MG tablet, Take 81 mg by mouth daily, Disp: , Rfl:   •  atenolol (TENORMIN) 50 mg tablet, TAKE 1 TABLET DAILY, Disp: 90 tablet, Rfl: 3  •  atorvastatin (LIPITOR) 40 mg tablet, Take 1 tablet (40 mg total) by mouth daily, Disp: 30 tablet, Rfl: 6  •  clopidogrel (PLAVIX) 75 mg tablet, Take 1 tablet (75 mg total) by mouth daily, Disp: 90 tablet, Rfl: 1  •  esomeprazole (NexIUM) 20 mg capsule, Take 20 mg by mouth every morning before breakfast, Disp: , Rfl:   • FLUoxetine (PROzac) 20 mg capsule, TAKE 1 CAPSULE BY MOUTH EVERY DAY, Disp: 90 capsule, Rfl: 3  •  losartan (COZAAR) 25 mg tablet, Take 1 tablet (25 mg total) by mouth daily, Disp: 90 tablet, Rfl: 1  •  multivitamin (THERAGRAN) TABS, Take 1 tablet by mouth daily, Disp: , Rfl:   •  tamsulosin (FLOMAX) 0 4 mg, TAKE 1 CAPSULE BY MOUTH IN THE MORNING, Disp: 30 capsule, Rfl: 5    ACTIVE PROBLEM LIST:  Patient Active Problem List   Diagnosis   • Complete tear of left rotator cuff   • Biceps tendinitis   • Benign hypertension   • Allergic rhinitis   • Hyperlipidemia   • Depression   • Impaired fasting glucose   • Vitamin D deficiency   • Bilateral primary osteoarthritis of knee   • Primary osteoarthritis of right knee   • Primary osteoarthritis of left knee   • Chronic venous insufficiency   • Stable angina (HCC)   • Coronary artery disease involving native coronary artery of native heart without angina pectoris   • Status post insertion of drug-eluting stent into left anterior descending (LAD) artery       PAST MEDICAL HISTORY:  Past Medical History:   Diagnosis Date   • Arthritis 2012   • Depression 2012   • Hyperlipidemia    • Hypertension        PAST SURGICAL HISTORY:  Past Surgical History:   Procedure Laterality Date   • ABDOMINAL SURGERY     • CARDIAC CATHETERIZATION N/A 9/23/2022    Procedure: Cardiac catheterization;  Surgeon: Donnell Delgado MD;  Location: [facility]; Service: Cardiology   • CARDIAC CATHETERIZATION N/A 9/23/2022    Procedure: Cardiac Coronary Angiogram;  Surgeon: Donnell Delgado MD;  Location: [address] [place] [address] CATH LAB; Service: Cardiology   • CARDIAC CATHETERIZATION Left 9/23/2022    Procedure: Cardiac Left Heart Cath;  Surgeon: Donnell Delgado MD;  Location: [facility]; Service: Cardiology   • CARDIAC CATHETERIZATION N/A 9/23/2022    Procedure: Cardiac pci;  Surgeon: Donnell Delgado MD;  Location: [address] [place] [address] CATH LAB;   Service: Cardiology   • KNEE ARTHROSCOPY     • NASAL SINUS SURGERY     • HI SURGICAL ARTHROSCOPY SHOULDER BICEPS TENODESIS Right 2017    Procedure: ARTHROSCOPIC BICEPS TENODESIS;  Surgeon: Jeremy Agrawal MD;  Location: [facility] MA[facility];  Service: Orthopedics   • HI SURGICAL ARTHROSCOPY SHOULDER W/ROTATOR CUFF RPR Right 2017    Procedure: SHOULDER ARTHROSCOPY ROTATOR CUFF REPAIR ;  Surgeon: Jeremy Agrawal MD;  Location: [facility];  Service: Orthopedics   • HI SURGICAL ARTHROSCOPY [place] DBRDMT 3+ Right 2017    Procedure: ARTHROSCOPY SHOULDER;  Surgeon: Jeremy Agrawal MD;  Location: [facility];  Service: Orthopedics   • TONSILLECTOMY         FAMILY HISTORY:  Family History   Problem Relation Age of Onset   • Cancer Father         Brain tumor   • Heart disease Father    • Hyperlipidemia Father    • Hypertension Father    • Brain cancer Father    • Migraines Mother    • Cancer Brother         Melanoma       SOCIAL HISTORY:  Social History     Socioeconomic History   • Marital status: /Civil Union     Spouse name: Not on file   • Number of children: Not on file   • Years of education: Not on file   • Highest education level: Not on file   Occupational History   • Occupation: Part time   Tobacco Use   • Smoking status: Former     Packs/day: 0 25     Years: 30 00     Pack years: 7 50     Types: Cigarettes     Start date: 5     Quit date:      Years since quittin 1   • Smokeless tobacco: Never   Vaping Use   • Vaping Use: Never used   Substance and Sexual Activity   • Alcohol use: Not Currently     Alcohol/week: 0 0 standard drinks     Comment: social   • Drug use: No   • Sexual activity: Yes     Partners: Female     Birth control/protection: Condom Male     Comment: Denied high risk sexual behavior   Other Topics Concern   • Not on file   Social History Narrative   • Not on file     Social Determinants of Health     Financial Resource Strain: Not on file   Food Insecurity: Not on file   Transportation Needs: Not on file Physical Activity: Not on file   Stress: Not on file   Social Connections: Not on file   Intimate Partner Violence: Not on file   Housing Stability: Not on file       Review of Systems   Constitutional: Negative for activity change, chills, fatigue and fever  HENT: Negative for congestion  Eyes: Negative for discharge  Respiratory: Negative for cough, chest tightness and shortness of breath  Cardiovascular: Positive for leg swelling (As per HPI)  Negative for chest pain and palpitations  Gastrointestinal: Negative for abdominal pain  Endocrine: Negative for polydipsia, polyphagia and polyuria  Genitourinary: Negative for difficulty urinating  Musculoskeletal: Negative for arthralgias and myalgias  Skin: Negative for rash  Allergic/Immunologic: Negative for immunocompromised state  Neurological: Negative for dizziness, syncope, weakness, light-headedness and headaches  Hematological: Negative for adenopathy  Does not bruise/bleed easily  Psychiatric/Behavioral: Negative for dysphoric mood  The patient is not nervous/anxious  Objective:  Vitals:    02/07/23 1046 02/07/23 1116   BP: 158/74 136/70   BP Location: Left arm Left arm   Patient Position: Sitting Sitting   Pulse: 59    Resp: 12    SpO2: 98%    Weight: 97 1 kg (214 lb)    Height: 6' (1 829 m)      Body mass index is 29 02 kg/m²  Physical Exam  Vitals and nursing note reviewed  Constitutional:       General: He is not in acute distress  Appearance: He is well-developed  He is not ill-appearing  HENT:      Head: Normocephalic and atraumatic  Eyes:      Extraocular Movements: Extraocular movements intact  Pupils: Pupils are equal, round, and reactive to light  Neck:      Thyroid: No thyromegaly  Vascular: No carotid bruit or JVD  Cardiovascular:      Rate and Rhythm: Normal rate and regular rhythm  Heart sounds: Normal heart sounds     Pulmonary:      Effort: Pulmonary effort is normal  No respiratory distress  Breath sounds: Normal breath sounds  Musculoskeletal:         General: Swelling present  Cervical back: Neck supple  Right lower leg: No edema  Left lower leg: No edema  Comments: 2+ edema to the mid pretibial region  No complaint of calf tenderness, Homans negative  Pedal pulses 1+, no neurovascular compromise  Multiple varicosities of the lower leg  Increased swollen area left lateral calf region  Lymphadenopathy:      Cervical: No cervical adenopathy  Skin:     General: Skin is warm and dry  Findings: No rash  Neurological:      General: No focal deficit present  Mental Status: He is alert and oriented to person, place, and time  Mental status is at baseline     Psychiatric:         Mood and Affect: Mood normal          Behavior: Behavior normal            RESULTS:    Recent Results (from the past 1008 hour(s))   Comprehensive metabolic panel    Collection Time: 01/20/23  7:54 AM   Result Value Ref Range    Sodium 141 135 - 147 mmol/L    Potassium 4 5 3 5 - 5 3 mmol/L    Chloride 105 96 - 108 mmol/L    CO2 29 21 - 32 mmol/L    ANION GAP 7 4 - 13 mmol/L    BUN 18 5 - 25 mg/dL    Creatinine 1 05 0 60 - 1 30 mg/dL    Glucose, Fasting 109 (H) 65 - 99 mg/dL    Calcium 9 0 8 3 - 10 1 mg/dL    AST 21 5 - 45 U/L    ALT 24 12 - 78 U/L    Alkaline Phosphatase 56 46 - 116 U/L    Total Protein 6 6 6 4 - 8 4 g/dL    Albumin 3 5 3 5 - 5 0 g/dL    Total Bilirubin 1 06 (H) 0 20 - 1 00 mg/dL    eGFR 71 ml/min/1 73sq m   CBC and differential    Collection Time: 01/20/23  7:54 AM   Result Value Ref Range    WBC 7 95 4 31 - 10 16 Thousand/uL    RBC 5 01 3 88 - 5 62 Million/uL    Hemoglobin 15 6 12 0 - 17 0 g/dL    Hematocrit 46 3 36 5 - 49 3 %    MCV 92 82 - 98 fL    MCH 31 1 26 8 - 34 3 pg    MCHC 33 7 31 4 - 37 4 g/dL    RDW 16 1 (H) 11 6 - 15 1 %    MPV 10 1 8 9 - 12 7 fL    Platelets 548 807 - 745 Thousands/uL    nRBC 0 /100 WBCs    Neutrophils Relative 68 43 - 75 %    Immat GRANS % 0 0 - 2 %    Lymphocytes Relative 21 14 - 44 %    Monocytes Relative 9 4 - 12 %    Eosinophils Relative 2 0 - 6 %    Basophils Relative 0 0 - 1 %    Neutrophils Absolute 5 38 1 85 - 7 62 Thousands/µL    Immature Grans Absolute 0 03 0 00 - 0 20 Thousand/uL    Lymphocytes Absolute 1 63 0 60 - 4 47 Thousands/µL    Monocytes Absolute 0 75 0 17 - 1 22 Thousand/µL    Eosinophils Absolute 0 13 0 00 - 0 61 Thousand/µL    Basophils Absolute 0 03 0 00 - 0 10 Thousands/µL   Hemoglobin A1C    Collection Time: 01/20/23  7:54 AM   Result Value Ref Range    Hemoglobin A1C 5 9 (H) Normal 3 8-5 6%; PreDiabetic 5 7-6 4%; Diabetic >=6 5%; Glycemic control for adults with diabetes <7 0% %     mg/dl   Lipid panel    Collection Time: 01/20/23  7:54 AM   Result Value Ref Range    Cholesterol 206 (H) See Comment mg/dL    Triglycerides 100 See Comment mg/dL    HDL, Direct 52 >=40 mg/dL    LDL Calculated 134 (H) 0 - 100 mg/dL    Non-HDL-Chol (CHOL-HDL) 154 mg/dl       This note was created with voice recognition software  Phonic, grammatical and spelling errors may be present within the note as a result

## 2023-02-08 ENCOUNTER — TELEPHONE (OUTPATIENT)
Dept: INTERNAL MEDICINE CLINIC | Facility: CLINIC | Age: 73
End: 2023-02-08

## 2023-02-08 ENCOUNTER — HOSPITAL ENCOUNTER (OUTPATIENT)
Dept: NON INVASIVE DIAGNOSTICS | Facility: CLINIC | Age: 73
Discharge: HOME | End: 2023-02-08

## 2023-02-08 ENCOUNTER — TELEPHONE (OUTPATIENT)
Dept: CARDIOLOGY CLINIC | Facility: CLINIC | Age: 73
End: 2023-02-08

## 2023-02-08 DIAGNOSIS — M79.89 LEFT LEG SWELLING: ICD-10-CM

## 2023-02-08 DIAGNOSIS — I82.412 ACUTE DEEP VEIN THROMBOSIS (DVT) OF FEMORAL VEIN OF LEFT LOWER EXTREMITY (HCC): Primary | ICD-10-CM

## 2023-02-08 NOTE — TELEPHONE ENCOUNTER
Question on meds:     anat was given today      Told to stop low dose asparin  flouxetine interaction and plavix? He takes these too he says?      And hes on plavix since stent was put in

## 2023-02-08 NOTE — TELEPHONE ENCOUNTER
Contacted by radiology this morning that patient's left leg is positive for DVT  Spoke with patient's cardiologist   We are going to add Eliquis 10 mg twice daily x10 days then 5 mg twice a day  He is to continue his Plavix  Stop aspirin  Schedule follow-up in 7 to 10 days

## 2023-02-08 NOTE — TELEPHONE ENCOUNTER
I received a phone call from jono Fernandes Monica developed swollen left leg  He had ultrasound today which was positive for DVT  His ostial LAD 4 5 mm stent was placed in September 2022  Given that he is 5 months out I recommend treatment with Eliquis and Plavix and discontinue aspirin  He will be loaded with Eliquis and then go to 5 mg twice daily

## 2023-02-08 NOTE — TELEPHONE ENCOUNTER
As per Dr Tona Perea, "He is on a low-dose of fluoxetine   Would continue that with the Plavix and stop the aspirin as planned with adding the Eliquis "    Spoke with the patient and advised him of this  He verbalized understanding

## 2023-02-13 DIAGNOSIS — I10 ESSENTIAL HYPERTENSION: ICD-10-CM

## 2023-02-13 RX ORDER — ATENOLOL 50 MG/1
TABLET ORAL
Qty: 90 TABLET | Refills: 3 | Status: SHIPPED | OUTPATIENT
Start: 2023-02-13

## 2023-02-16 ENCOUNTER — OFFICE VISIT (OUTPATIENT)
Dept: INTERNAL MEDICINE CLINIC | Facility: CLINIC | Age: 73
End: 2023-02-16

## 2023-02-16 VITALS
HEART RATE: 70 BPM | DIASTOLIC BLOOD PRESSURE: 80 MMHG | TEMPERATURE: 98 F | HEIGHT: 72 IN | SYSTOLIC BLOOD PRESSURE: 140 MMHG | BODY MASS INDEX: 29.39 KG/M2 | OXYGEN SATURATION: 99 % | RESPIRATION RATE: 18 BRPM | WEIGHT: 217 LBS

## 2023-02-16 DIAGNOSIS — I82.412 ACUTE DEEP VEIN THROMBOSIS (DVT) OF FEMORAL VEIN OF LEFT LOWER EXTREMITY (HCC): Primary | ICD-10-CM

## 2023-02-16 NOTE — PROGRESS NOTES
Assessment/Plan:     Appears to be doing well  Continue Eliquis  Since there was no obvious mechanism for the DVT, we will have him seen by hematology looking for any inheritable clotting disorders  Quality Measures:       Return for Next scheduled follow up  No problem-specific Assessment & Plan notes found for this encounter  Diagnoses and all orders for this visit:    Acute deep vein thrombosis (DVT) of femoral vein of left lower extremity ([address] )  -     Ambulatory Referral to Hematology / Oncology; Future        Subjective:      Patient ID: Dean Roy is a 81 y o  male  Patient comes in today for follow-up  He is on the Eliquis  Just finished the loading dose and starting the maintenance dose  Leg is doing better  Less swelling  No pain at all  He is ambulating without difficulty  Current with his specialists  Before this, he had not had recent orthopedic surgery  No long trips  ALLERGIES:  Allergies   Allergen Reactions   • Amlodipine Swelling   • Lisinopril      Other reaction(s): Cough  Other reaction(s): Cough       CURRENT MEDICATIONS:    Current Outpatient Medications:   •  apixaban (Eliquis) 5 mg, Take 2 tablets twice daily for 7 days, then 1 tablet twice daily  , Disp: 74 tablet, Rfl: 5  •  atenolol (TENORMIN) 50 mg tablet, TAKE 1 TABLET DAILY, Disp: 90 tablet, Rfl: 3  •  atorvastatin (LIPITOR) 40 mg tablet, Take 1 tablet (40 mg total) by mouth daily, Disp: 30 tablet, Rfl: 6  •  clopidogrel (PLAVIX) 75 mg tablet, Take 1 tablet (75 mg total) by mouth daily, Disp: 90 tablet, Rfl: 1  •  esomeprazole (NexIUM) 20 mg capsule, Take 20 mg by mouth every morning before breakfast, Disp: , Rfl:   •  FLUoxetine (PROzac) 20 mg capsule, TAKE 1 CAPSULE BY MOUTH EVERY DAY, Disp: 90 capsule, Rfl: 3  •  losartan (COZAAR) 25 mg tablet, Take 1 tablet (25 mg total) by mouth daily, Disp: 90 tablet, Rfl: 1  •  multivitamin (THERAGRAN) TABS, Take 1 tablet by mouth daily, Disp: , Rfl:   • tamsulosin (FLOMAX) 0 4 mg, TAKE 1 CAPSULE BY MOUTH IN THE MORNING, Disp: 30 capsule, Rfl: 5    ACTIVE PROBLEM LIST:  Patient Active Problem List   Diagnosis   • Complete tear of left rotator cuff   • Biceps tendinitis   • Benign hypertension   • Allergic rhinitis   • Hyperlipidemia   • Depression   • Impaired fasting glucose   • Vitamin D deficiency   • Bilateral primary osteoarthritis of knee   • Primary osteoarthritis of right knee   • Primary osteoarthritis of left knee   • Chronic venous insufficiency   • Stable angina ([facility] [address] )   • Coronary artery disease involving native coronary artery of native heart without angina pectoris   • Status post insertion of drug-eluting stent into left anterior descending (LAD) artery       PAST MEDICAL HISTORY:  Past Medical History:   Diagnosis Date   • Arthritis 2012   • Depression 2012   • Hyperlipidemia    • Hypertension        PAST SURGICAL HISTORY:  Past Surgical History:   Procedure Laterality Date   • ABDOMINAL SURGERY     • CARDIAC CATHETERIZATION N/A 9/23/2022    Procedure: Cardiac catheterization;  Surgeon: Hollie uK MD;  [facility]; Service: Cardiology   • CARDIAC CATHETERIZATION N/A 9/23/2022    Procedure: Cardiac Coronary Angiogram;  Surgeon: Hollie Ku MD;  Location: [address] [place] [address] CATH LAB; Service: Cardiology   • CARDIAC CATHETERIZATION Left 9/23/2022    Procedure: Cardiac Left Heart Cath;  Surgeon: Hollie Ku MD;  [facility]ARD[facility]; Service: Cardiology   • CARDIAC CATHETERIZATION N/A 9/23/2022    Procedure: Cardiac pci;  Surgeon: Hollie Ku MD;  Location: [address] [place] [address] CATH LAB;   Service: Cardiology   • KNEE ARTHROSCOPY     • NASAL SINUS SURGERY     • CA SURGICAL ARTHROSCOPY SHOULDER BICEPS TENODESIS Right 1/25/2017    Procedure: ARTHROSCOPIC BICEPS TENODESIS;  Surgeon: Anne Rojas MD;  Location: [facility] MAIN OR;  Service: Orthopedics   • CA SURGICAL ARTHROSCOPY SHOULDER W/ROTATOR CUFF RPR Right 2017    Procedure: SHOULDER ARTHROSCOPY ROTATOR CUFF REPAIR ;  Surgeon: Rosaura Ricardo MD;  Location: [facility] MAIN [facility];  Service: Orthopedics   • [place] SURGICAL ARTHROSCOPY Geraldo Bray DBRDMT 3+ Right 2017    Procedure: ARTHROSCOPY SHOULDER;  Surgeon: Rosaura Ricardo MD;  Location: [facility] MAIN [facility];  Service: Orthopedics   • TONSILLECTOMY         FAMILY HISTORY:  Family History   Problem Relation Age of Onset   • Cancer Father         Brain tumor   • Heart disease Father    • Hyperlipidemia Father    • Hypertension Father    • Brain cancer Father    • Migraines Mother    • Cancer Brother         Melanoma       SOCIAL HISTORY:  Social History     Socioeconomic History   • Marital status: /Civil Union     Spouse name: Not on file   • Number of children: Not on file   • Years of education: Not on file   • Highest education level: Not on file   Occupational History   • Occupation: Part time   Tobacco Use   • Smoking status: Former     Packs/day: 0 25     Years: 30 00     Pack years: 7 50     Types: Cigarettes     Start date:      Quit date:      Years since quittin 1   • Smokeless tobacco: Never   Vaping Use   • Vaping Use: Never used   Substance and Sexual Activity   • Alcohol use: Not Currently     Alcohol/week: 0 0 standard drinks     Comment: social   • Drug use: No   • Sexual activity: Yes     Partners: Female     Birth control/protection: Condom Male     Comment: Denied high risk sexual behavior   Other Topics Concern   • Not on file   Social History Narrative   • Not on file     Social Determinants of Health     Financial Resource Strain: Not on file   Food Insecurity: Not on file   Transportation Needs: Not on file   Physical Activity: Not on file   Stress: Not on file   Social Connections: Not on file   Intimate Partner Violence: Not on file   Housing Stability: Not on file       Review of Systems   Respiratory: Negative for shortness of breath  Cardiovascular: Negative for chest pain  Gastrointestinal: Negative for abdominal pain  Objective:  Vitals:    02/16/23 1540   BP: 140/80   BP Location: Left arm   Patient Position: Sitting   Cuff Size: Standard   Pulse: 70   Resp: 18   Temp: 98 °F (36 7 °C)   TempSrc: Tympanic   SpO2: 99%   Weight: 98 4 kg (217 lb)   Height: 6' (1 829 m)     Body mass index is 29 43 kg/m²  Physical Exam  Vitals and nursing note reviewed  Constitutional:       Appearance: He is well-developed  Cardiovascular:      Rate and Rhythm: Normal rate and regular rhythm  Heart sounds: Normal heart sounds  Pulmonary:      Effort: Pulmonary effort is normal       Breath sounds: Normal breath sounds  Neurological:      Mental Status: He is alert             RESULTS:    Recent Results (from the past 1008 hour(s))   Comprehensive metabolic panel    Collection Time: 01/20/23  7:54 AM   Result Value Ref Range    Sodium 141 135 - 147 mmol/L    Potassium 4 5 3 5 - 5 3 mmol/L    Chloride 105 96 - 108 mmol/L    CO2 29 21 - 32 mmol/L    ANION GAP 7 4 - 13 mmol/L    BUN 18 5 - 25 mg/dL    Creatinine 1 05 0 60 - 1 30 mg/dL    Glucose, Fasting 109 (H) 65 - 99 mg/dL    Calcium 9 0 8 3 - 10 1 mg/dL    AST 21 5 - 45 U/L    ALT 24 12 - 78 U/L    Alkaline Phosphatase 56 46 - 116 U/L    Total Protein 6 6 6 4 - 8 4 g/dL    Albumin 3 5 3 5 - 5 0 g/dL    Total Bilirubin 1 06 (H) 0 20 - 1 00 mg/dL    eGFR 71 ml/min/1 73sq m   CBC and differential    Collection Time: 01/20/23  7:54 AM   Result Value Ref Range    WBC 7 95 4 31 - 10 16 Thousand/uL    RBC 5 01 3 88 - 5 62 Million/uL    Hemoglobin 15 6 12 0 - 17 0 g/dL    Hematocrit 46 3 36 5 - 49 3 %    MCV 92 82 - 98 fL    MCH 31 1 26 8 - 34 3 pg    MCHC 33 7 31 4 - 37 4 g/dL    RDW 16 1 (H) 11 6 - 15 1 %    MPV 10 1 8 9 - 12 7 fL    Platelets 895 866 - 649 Thousands/uL    nRBC 0 /100 WBCs    Neutrophils Relative 68 43 - 75 %    Immat GRANS % 0 0 - 2 %    Lymphocytes Relative 21 14 - 44 %    Monocytes Relative 9 4 - 12 % Eosinophils Relative 2 0 - 6 %    Basophils Relative 0 0 - 1 %    Neutrophils Absolute 5 38 1 85 - 7 62 Thousands/µL    Immature Grans Absolute 0 03 0 00 - 0 20 Thousand/uL    Lymphocytes Absolute 1 63 0 60 - 4 47 Thousands/µL    Monocytes Absolute 0 75 0 17 - 1 22 Thousand/µL    Eosinophils Absolute 0 13 0 00 - 0 61 Thousand/µL    Basophils Absolute 0 03 0 00 - 0 10 Thousands/µL   Hemoglobin A1C    Collection Time: 01/20/23  7:54 AM   Result Value Ref Range    Hemoglobin A1C 5 9 (H) Normal 3 8-5 6%; PreDiabetic 5 7-6 4%; Diabetic >=6 5%; Glycemic control for adults with diabetes <7 0% %     mg/dl   Lipid panel    Collection Time: 01/20/23  7:54 AM   Result Value Ref Range    Cholesterol 206 (H) See Comment mg/dL    Triglycerides 100 See Comment mg/dL    HDL, Direct 52 >=40 mg/dL    LDL Calculated 134 (H) 0 - 100 mg/dL    Non-HDL-Chol (CHOL-HDL) 154 mg/dl       This note was created with voice recognition software  Phonic, grammatical and spelling errors may be present within the note as a result

## 2023-03-01 PROBLEM — I82.412 ACUTE DEEP VEIN THROMBOSIS (DVT) OF FEMORAL VEIN OF LEFT LOWER EXTREMITY (HCC): Status: ACTIVE | Noted: 2023-03-01

## 2023-03-06 ENCOUNTER — HOSPITAL ENCOUNTER (OUTPATIENT)
Dept: PULMONOLOGY | Facility: HOSPITAL | Age: 73
Discharge: HOME | End: 2023-03-06

## 2023-03-06 DIAGNOSIS — R91.8 LUNG NODULE, MULTIPLE: ICD-10-CM

## 2023-03-06 RX ORDER — ALBUTEROL SULFATE 2.5 MG/3ML
2.5 SOLUTION RESPIRATORY (INHALATION) ONCE
Status: COMPLETED | OUTPATIENT
Start: 2023-03-06 | End: 2023-03-06

## 2023-03-06 RX ADMIN — ALBUTEROL SULFATE 2.5 MG: 2.5 SOLUTION RESPIRATORY (INHALATION) at 07:57

## 2023-03-07 NOTE — PROGRESS NOTES
HEMATOLOGY CLINIC NOTE    Primary Care Provider: Singh Saenz MD  Referring Provider: Eddy Llanos  MRN: [id number]  : 1950    Assessment / Plan:   1  Acute deep vein thrombosis (DVT) of femoral vein of left lower extremity [facility])  This is a 81year old male with a recent history of DVT of LLE which was in posterior tibial veins, peroneal veins extending into femoral vein  This was unprovoked, first in life  He is taking Eliquis 5mg BID currently  On eliquis since 23  Patient will take Eliquis for at least 3 months  Due to proximal DVT, likely will require at least 6 months of AC  Will hold Eliquis for 5 days starting   Then on , he will have thrombosis panel drawn  Once drawn, return back on Eliquis and RTC early may to discuss results  We will also obtain venous doppler to see status of VTE around that time  Will discuss risks vs  Benefits of continuing AC vs  Lifelong vs  Stopping in the future with patient at next visit  - Ambulatory Referral to Hematology / Oncology  - Thrombosis Panel; Future    2  Colonoscopy due  Patient will have after 3 months of AC    · Discussion of decision making    I personally reviewed the following lab results, the image studies, pathology, other specialty/physicians consult notes and recommendations, and outside medical records from [facility]  I had a lengthy discussion with the patient and shared the work-up findings  I spent 30 minutes reviewing the records (labs, clinician notes, outside records, medical history, ordering medicine/tests/procedures, interpreting the imaging/labs previously done) and coordination of care as well as direct time with the patient today, of which greater than 50% of the time was spent in counseling and coordination of care with the patient/family  · Plan/Labs  · Continue Eliquis 5mg BID for at least 3 months total  Likely will extend therapy past 3 months due to proximal, unprovoked DVT     · Will draw thrombosis panel as above after holding Eliquis for 5 days prior  Will also obtain venous doppler at that time   · Patient to have colonoscopy after at least 3 months of therapy as this is due    Follow Up: Early May 2023    All questions were answered to the patient's satisfaction during this encounter  The patient knows the contact information for our office and knows to reach out for any relevant concerns related to this encounter  They are to call for any temperature 100 4 or higher, new symptoms including but not restricted to shaking chills, decreased appetite, nausea, vomiting, diarrhea, increased fatigue, shortness of breath or chest pain, confusion, and not feeling the strength to come to the clinic  For all other listed problems and medical diagnosis in their chart - they are managed by PCP and/or other specialists, which the patient acknowledges  Thank you very much for your consultation and making us a part of this patient's care  We are continuing to follow closely with you  Please do not hesitate to reach out to me with any additional questions or concerns  Reason for visit:       Chief Complaint   Patient presents with   • Consult       History of Hematology Illness:     Cindy Mederos is a 81 y o  male who came in for consultation  1  Unprovoked proximal/distal DVT LLE, 1st in life  - 2/2023: patient had left lower extremity swelling x 2 weeks  Swelling started in ankle and progressed to mid calf  Patient was ordered a venous doppler showing Acute mostly occlusive deep vein thrombosis in the posterior tibial and peroneal veins extending into the common femoral vein where it is poorly attached  He was started on Eliquis loading dosage  Currently taking Eliquis 5mg BID  Interval History:   03/09/23:  This is a 81year old male with a PMH of bicep tendinitis, rotator cuff tear, HTN, rhinitis, HLD, depression, Vitamin D deficiency, OA, chronic venous insufficiency, stable angina, CAD, DVT, and more presenting for consultation  Patient denies CP, new SOB, bleeding anywhere  Does have some SOB at baseline that pulmonary is working up  Tolerating Eliquis thus far without complaints  It is affordable for patient at this time  He does take Plavix  Was taken off ASA recently  Plavix is for recent stent placement 9/2022  He has never had a prior VTE  No Fhx VTE  No liver or kidney conditions  No trauma, immobilization, surgery, hospital, travel, exogenous hormone therapy prior to clot  No cancer hx  No CVA history  No autoimmune conditions  Does nor drink  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 [employer]  No cancer hx personally  Father had a brain tumor, brother has melanoma that is metastatic  Due for colonoscopy >10 years since last one  Problem list:       Patient Active Problem List   Diagnosis   • Complete tear of left rotator cuff   • Biceps tendinitis   • Benign hypertension   • Allergic rhinitis   • Hyperlipidemia   • Depression   • Impaired fasting glucose   • Vitamin D deficiency   • Bilateral primary osteoarthritis of knee   • Primary osteoarthritis of right knee   • Primary osteoarthritis of left knee   • Chronic venous insufficiency   • Stable angina ([facility] [address] )   • Coronary artery disease involving native coronary artery of native heart without angina pectoris   • Status post insertion of drug-eluting stent into left anterior descending (LAD) artery   • Acute deep vein thrombosis (DVT) of femoral vein of left lower extremity ([facility])   • Lung nodule, multiple       REVIEW OF SYMPTOMS:   Review of Systems   Constitutional: Negative for activity change, appetite change, chills, diaphoresis, fatigue, fever and unexpected weight change  HENT: Negative for nosebleeds  Respiratory: Negative for apnea, cough, chest tightness and shortness of breath (at baseline  none currently  )  Cardiovascular: Negative for chest pain, palpitations and leg swelling     Gastrointestinal: Negative for abdominal distention, abdominal pain, anal bleeding, blood in stool, constipation, diarrhea, nausea and vomiting  Endocrine: Negative for cold intolerance  Genitourinary: Negative for hematuria  Skin: Negative for color change, pallor, rash and wound  Neurological: Negative for dizziness, weakness, light-headedness and headaches  Hematological: Negative for adenopathy  Does not bruise/bleed easily  PHYSICAL EXAMINATION:     Vital Signs:   /70   Pulse 55   Temp 98 3 °F (36 8 °C) (Temporal)   Resp 18   Ht 6' (1 829 m)   Wt 97 5 kg (215 lb)   SpO2 95%   BMI 29 16 kg/m²   Body surface area is 2 2 meters squared  Ht Readings from Last 8 Encounters:   03/09/23 6' (1 829 m)   02/16/23 6' (1 829 m)   02/07/23 6' (1 829 m)   01/24/23 6' (1 829 m)   01/20/23 6' (1 829 m)   11/21/22 6' (1 829 m)   10/18/22 6' (1 829 m)   10/14/22 6' (1 829 m)       Wt Readings from Last 8 Encounters:   03/09/23 97 5 kg (215 lb)   02/16/23 98 4 kg (217 lb)   02/07/23 97 1 kg (214 lb)   01/24/23 96 6 kg (213 lb)   01/20/23 97 1 kg (214 lb)   11/21/22 98 4 kg (217 lb)   10/18/22 99 4 kg (219 lb 3 2 oz)   10/14/22 98 9 kg (218 lb)          Physical Exam  Constitutional:       General: He is not in acute distress  Appearance: Normal appearance  He is normal weight  He is not ill-appearing, toxic-appearing or diaphoretic  HENT:      Head: Normocephalic and atraumatic  Eyes:      General: No scleral icterus  Extraocular Movements: Extraocular movements intact  Conjunctiva/sclera: Conjunctivae normal    Cardiovascular:      Rate and Rhythm: Normal rate and regular rhythm  Pulmonary:      Effort: Pulmonary effort is normal  No respiratory distress  Abdominal:      Tenderness: There is no abdominal tenderness  Musculoskeletal:      Cervical back: Normal range of motion and neck supple  Right lower leg: No edema  Left lower leg: No edema  Lymphadenopathy:      Cervical: No cervical adenopathy  Skin:     General: Skin is warm and dry  Coloration: Skin is not jaundiced or pale  Findings: No bruising, erythema, lesion or rash  Neurological:      General: No focal deficit present  Mental Status: He is alert and oriented to person, place, and time  Mental status is at baseline  Motor: No weakness  Psychiatric:         Mood and Affect: Mood normal          Behavior: Behavior normal          Thought Content: Thought content normal          Judgment: Judgment normal          Reviewed historical information  PAST MEDICAL HISTORY:    Past Medical History:   Diagnosis Date   • Arthritis 2012   • Depression 2012   • Hyperlipidemia    • Hypertension        PAST SURGICAL HISTORY:    Past Surgical History:   Procedure Laterality Date   • ABDOMINAL SURGERY     • CARDIAC CATHETERIZATION N/A 9/23/2022    Procedure: Cardiac catheterization;  Surgeon: Orlin Prees MD;  [facility] CARD[facility]; Service: Cardiology   • CARDIAC CATHETERIZATION N/A 9/23/2022    Procedure: Cardiac Coronary Angiogram;  Surgeon: Orlin Peres MD;  Location: [address] [place] [address] CATH LAB; Service: Cardiology   • CARDIAC CATHETERIZATION Left 9/23/2022    Procedure: Cardiac Left Heart Cath;  Surgeon: Orlin Peres MD;  [facility]: [facility] CARD[facility] C[facility]; Service: Cardiology   • CARDIAC CATHETERIZATION N/A 9/23/2022    Procedure: Cardiac pci;  Surgeon: Orlin Peres MD;  Location: [address] [place] [address] CATH LAB;   Service: Cardiology   • KNEE ARTHROSCOPY     • NASAL SINUS SURGERY     • ME SURGICAL ARTHROSCOPY SHOULDER BICEPS TENODESIS Right 1/25/2017    Procedure: ARTHROSCOPIC BICEPS TENODESIS;  Surgeon: Fermín Garcia MD;  Location: [facility] MAIN OR;  Service: Orthopedics   • ME SURGICAL ARTHROSCOPY SHOULDER W/ROTATOR CUFF RPR Right 1/25/2017    Procedure: SHOULDER ARTHROSCOPY ROTATOR CUFF REPAIR ;  Surgeon: Fermín Garcia MD;  [facility]: [facility];  Service: Orthopedics   • ME SURGICAL ARTHROSCOPY Mony Griggs DBRDMT 3+ Right 2017    Procedure: ARTHROSCOPY SHOULDER;  Surgeon: Glenis Christie MD;  [facility]: [facility];  Service: Orthopedics   • TONSILLECTOMY           CURRENT MEDICATIONS:     Current Outpatient Medications:   •  apixaban (Eliquis) 5 mg, Take 2 tablets twice daily for 7 days, then 1 tablet twice daily  , Disp: 74 tablet, Rfl: 5  •  atenolol (TENORMIN) 50 mg tablet, TAKE 1 TABLET DAILY, Disp: 90 tablet, Rfl: 3  •  atorvastatin (LIPITOR) 40 mg tablet, Take 1 tablet (40 mg total) by mouth daily, Disp: 30 tablet, Rfl: 6  •  clopidogrel (PLAVIX) 75 mg tablet, TAKE 1 TABLET BY MOUTH EVERY DAY, Disp: 90 tablet, Rfl: 1  •  esomeprazole (NexIUM) 20 mg capsule, Take 20 mg by mouth every morning before breakfast, Disp: , Rfl:   •  FLUoxetine (PROzac) 20 mg capsule, TAKE 1 CAPSULE BY MOUTH EVERY DAY, Disp: 90 capsule, Rfl: 3  •  losartan (COZAAR) 25 mg tablet, TAKE 1 TABLET (25 MG TOTAL) BY MOUTH DAILY  , Disp: 90 tablet, Rfl: 1  •  multivitamin (THERAGRAN) TABS, Take 1 tablet by mouth daily, Disp: , Rfl:   •  tamsulosin (FLOMAX) 0 4 mg, TAKE 1 CAPSULE BY MOUTH IN THE MORNING, Disp: 30 capsule, Rfl: 5    SOCIAL HISTORY:    Social History     Tobacco Use   • Smoking status: Former     Packs/day: 0 25     Years: 30 00     Pack years: 7 50     Types: Cigarettes     Start date: 5     Quit date:      Years since quittin 2   • Smokeless tobacco: Never   Vaping Use   • Vaping Use: Never used   Substance Use Topics   • Alcohol use: Not Currently     Alcohol/week: 0 0 standard drinks     Comment: social   • Drug use: No       FAMILY HISTORY:    Family History   Problem Relation Age of Onset   • Cancer Father         Brain tumor   • Heart disease Father    • Hyperlipidemia Father    • Hypertension Father    • Brain cancer Father    • Migraines Mother    • Cancer Brother         Melanoma       ALLERGIES:    Allergies   Allergen Reactions   • Amlodipine Swelling   • Lisinopril      Other reaction(s): Cough  Other reaction(s): Cough         LAB:    Lab Results   Component Value Date    WBC 7 95 01/20/2023    HGB 15 6 01/20/2023    HCT 46 3 01/20/2023    MCV 92 01/20/2023     01/20/2023       Lab Results   Component Value Date     10/09/2015    SODIUM 141 01/20/2023    K 4 5 01/20/2023     01/20/2023    CO2 29 01/20/2023    ANIONGAP 6 10/09/2015    AGAP 7 01/20/2023    BUN 18 01/20/2023    CREATININE 1 05 01/20/2023    GLUC 112 09/23/2022    GLUF 109 (H) 01/20/2023    CALCIUM 9 0 01/20/2023    AST 21 01/20/2023    ALT 24 01/20/2023    ALKPHOS 56 01/20/2023    PROT 7 1 10/09/2015    TP 6 6 01/20/2023    BILITOT 1 09 (H) 10/09/2015    TBILI 1 06 (H) 01/20/2023    EGFR 71 01/20/2023       IMAGING:  VAS lower limb venous duplex study, unilateral/limited     [facility] REPORT  CLINICAL:  Indications: Patient complains of left foot swelling x 2 weeks, and one week of  left leg edema  Patient denies any pain  Doctor wants to rule out deep and superficial venous thrombosis  Operative History:  2022-09-22 Cardiac stent  Risk Factors  The patient has history of HTN, Hyperlipidemia and CAD  FINDINGS:     Left        Impression                CFV         Non Occlusive Thrombus    FV Prox     Non Occlusive Thrombus    FV Mid      Occlusive Subsegmental    FV Dist     Occlusive Subsegmental    Popliteal   Non Occlusive Thrombus    PostTibial  Occlusive Subsegmental    Peroneal    Occlusive Subsegmental             CONCLUSION:  Impression:  RIGHT LOWER LIMB LIMITED:  Evaluation shows no evidence of thrombus in the common femoral vein  Doppler evaluation shows a normal response to augmentation maneuvers  LEFT LOWER LIMB:  Acute mostly occlusive deep vein thrombosis in the posterior tibial and  peroneal veins extending into the common femoral vein where it is poorly  attached  No evidence of superficial thrombophlebitis noted    Popliteal, posterior tibial and anterior tibial arterial Doppler waveforms are  biphasic       Technical findings were given to Tarsha WATT      SIGNATURE:  Electronically Signed by: Kavitha Reeves MD [facility] Roshan Holliday on 2023-02-08 04:55:27 PM

## 2023-03-09 ENCOUNTER — CONSULT (OUTPATIENT)
Dept: HEMATOLOGY ONCOLOGY | Facility: CLINIC | Age: 73
End: 2023-03-09

## 2023-03-09 VITALS
RESPIRATION RATE: 18 BRPM | WEIGHT: 215 LBS | BODY MASS INDEX: 29.12 KG/M2 | HEIGHT: 72 IN | OXYGEN SATURATION: 95 % | HEART RATE: 55 BPM | SYSTOLIC BLOOD PRESSURE: 142 MMHG | TEMPERATURE: 98.3 F | DIASTOLIC BLOOD PRESSURE: 70 MMHG

## 2023-03-09 DIAGNOSIS — I82.412 ACUTE DEEP VEIN THROMBOSIS (DVT) OF FEMORAL VEIN OF LEFT LOWER EXTREMITY (HCC): Primary | ICD-10-CM

## 2023-03-09 DIAGNOSIS — Z12.11 ENCOUNTER FOR SCREENING COLONOSCOPY: ICD-10-CM

## 2023-03-15 ENCOUNTER — RA CDI HCC (OUTPATIENT)
Dept: OTHER | Facility: HOSPITAL | Age: 73
End: 2023-03-15

## 2023-03-15 NOTE — PROGRESS NOTES
[facility]  coding opportunities          Chart Reviewed number of suggestions sent to Provider: 1    F33 0 Major depressive disorder, recurrent, mild  OR  F33 1 Major depressive disorder, recurrent, moderate  OR  F33 2 Major depressive disorder, recurrent, severe without psychotic features  OR  F33 3 Major depressive disorder, recurrent, severe, with psychotic features  OR  F33 41 Major depressive disorder, recurrent, in partial remission  OR  F33 42 Major depressive disorder, recurrent in full remission  OR  F33 8 Other recurrent depressive disorders       Patients Insurance        Commercial Insurance: [email or web address]

## 2023-03-22 ENCOUNTER — OFFICE VISIT (OUTPATIENT)
Dept: INTERNAL MEDICINE CLINIC | Facility: CLINIC | Age: 73
End: 2023-03-22

## 2023-03-22 VITALS
RESPIRATION RATE: 16 BRPM | HEIGHT: 72 IN | WEIGHT: 218.8 LBS | SYSTOLIC BLOOD PRESSURE: 154 MMHG | BODY MASS INDEX: 29.64 KG/M2 | OXYGEN SATURATION: 95 % | DIASTOLIC BLOOD PRESSURE: 78 MMHG | HEART RATE: 52 BPM

## 2023-03-22 DIAGNOSIS — Z12.11 COLON CANCER SCREENING: ICD-10-CM

## 2023-03-22 DIAGNOSIS — R73.01 IMPAIRED FASTING GLUCOSE: ICD-10-CM

## 2023-03-22 DIAGNOSIS — I25.10 CORONARY ARTERY DISEASE INVOLVING NATIVE CORONARY ARTERY OF NATIVE HEART WITHOUT ANGINA PECTORIS: ICD-10-CM

## 2023-03-22 DIAGNOSIS — I82.412 ACUTE DEEP VEIN THROMBOSIS (DVT) OF FEMORAL VEIN OF LEFT LOWER EXTREMITY (HCC): ICD-10-CM

## 2023-03-22 DIAGNOSIS — I10 BENIGN HYPERTENSION: Primary | ICD-10-CM

## 2023-03-22 RX ORDER — LORATADINE 10 MG/1
10 TABLET ORAL DAILY
COMMUNITY

## 2023-03-22 NOTE — PROGRESS NOTES
Assessment/Plan:     Chronic problems are stable except for the pressure  He is going to double check at home and then call if not controlled  I told him at that point, would increase his losartan  But also encouraged him to get back on track with the exercise  Quality Measures:       Return in about 4 months (around 7/22/2023) for Recheck  No problem-specific Assessment & Plan notes found for this encounter  Diagnoses and all orders for this visit:    Benign hypertension  -     CBC and differential; Future  -     Comprehensive metabolic panel; Future  -     Lipid panel; Future    Acute deep vein thrombosis (DVT) of femoral vein of left lower extremity (HCC)    Coronary artery disease involving native coronary artery of native heart without angina pectoris    Colon cancer screening  -     Ambulatory referral to Gastroenterology; Future    Impaired fasting glucose  -     Hemoglobin A1C; Future    Other orders  -     loratadine (CLARITIN) 10 mg tablet; Take 10 mg by mouth daily        Subjective:      Patient ID: Wayne Turner is a 81 y o  male  Patient comes in today for routine follow-up  He states he is doing okay  The swelling in his leg comes and goes but overall improving  He did see hematology  He does have blood work planned for other causes of thrombosis  Heart disease is stable  He is finished with cardiac rehab but admits he has not been doing the exercises at home  Blood pressure appears high here today  He admits he has not been checking at home  No other complaints today  No further additions to his history  ALLERGIES:  Allergies   Allergen Reactions   • Amlodipine Swelling   • Lisinopril      Other reaction(s): Cough  Other reaction(s): Cough       CURRENT MEDICATIONS:    Current Outpatient Medications:   •  apixaban (Eliquis) 5 mg, Take 2 tablets twice daily for 7 days, then 1 tablet twice daily  , Disp: 74 tablet, Rfl: 5  •  atenolol (TENORMIN) 50 mg tablet, TAKE 1 TABLET DAILY, Disp: 90 tablet, Rfl: 3  •  atorvastatin (LIPITOR) 40 mg tablet, Take 1 tablet (40 mg total) by mouth daily, Disp: 30 tablet, Rfl: 6  •  clopidogrel (PLAVIX) 75 mg tablet, TAKE 1 TABLET BY MOUTH EVERY DAY, Disp: 90 tablet, Rfl: 1  •  esomeprazole (NexIUM) 20 mg capsule, Take 20 mg by mouth every morning before breakfast, Disp: , Rfl:   •  FLUoxetine (PROzac) 20 mg capsule, TAKE 1 CAPSULE BY MOUTH EVERY DAY, Disp: 90 capsule, Rfl: 3  •  loratadine (CLARITIN) 10 mg tablet, Take 10 mg by mouth daily, Disp: , Rfl:   •  losartan (COZAAR) 25 mg tablet, TAKE 1 TABLET (25 MG TOTAL) BY MOUTH DAILY  , Disp: 90 tablet, Rfl: 1  •  multivitamin (THERAGRAN) TABS, Take 1 tablet by mouth daily, Disp: , Rfl:   •  tamsulosin (FLOMAX) 0 4 mg, TAKE 1 CAPSULE BY MOUTH IN THE MORNING, Disp: 30 capsule, Rfl: 5    ACTIVE PROBLEM LIST:  Patient Active Problem List   Diagnosis   • Complete tear of left rotator cuff   • Biceps tendinitis   • Benign hypertension   • Allergic rhinitis   • Hyperlipidemia   • Depression   • Impaired fasting glucose   • Vitamin D deficiency   • Bilateral primary osteoarthritis of knee   • Primary osteoarthritis of right knee   • Primary osteoarthritis of left knee   • Chronic venous insufficiency   • Stable angina (HCC)   • Coronary artery disease involving native coronary artery of native heart without angina pectoris   • Status post insertion of drug-eluting stent into left anterior descending (LAD) artery   • Acute deep vein thrombosis (DVT) of femoral vein of left lower extremity (HCC)   • Lung nodule, multiple       PAST MEDICAL HISTORY:  Past Medical History:   Diagnosis Date   • Arthritis 2012   • Depression 2012   • Hyperlipidemia    • Hypertension        PAST SURGICAL HISTORY:  Past Surgical History:   Procedure Laterality Date   • ABDOMINAL SURGERY     • CARDIAC CATHETERIZATION N/A 9/23/2022    Procedure: Cardiac catheterization;  Surgeon: Lola Rowell MD;  Location: [address] [place] [address] LAB;  Service: Cardiology   • CARDIAC CATHETERIZATION N/A 2022    Procedure: Cardiac Coronary Angiogram;  Surgeon: Adina Reveles MD;  Location: [address] [place] [address] CATH LAB; Service: Cardiology   • CARDIAC CATHETERIZATION Left 2022    Procedure: Cardiac Left Heart Cath;  Surgeon: Adina Reevles MD;  [facility]; Service: Cardiology   • CARDIAC CATHETERIZATION N/A 2022    Procedure: Cardiac pci;  Surgeon: Adina Reveles MD;  Location: [facility] CATH LAB;   Service: Cardiology   • KNEE ARTHROSCOPY     • NASAL SINUS SURGERY     • [place] SURGICAL ARTHROSCOPY SHOULDER BICEPS TENODESIS Right 2017    Procedure: ARTHROSCOPIC BICEPS TENODESIS;  Surgeon: Lisha Castro MD;  Location: [facility];  Service: Orthopedics   • [place] SURGICAL ARTHROSCOPY SHOULDER W/ROTATOR CUFF RPR Right 2017    Procedure: SHOULDER ARTHROSCOPY ROTATOR CUFF REPAIR ;  Surgeon: Lisha Castro MD;  [facility];  Service: Orthopedics   • CO SURGICAL ARTHROSCOPY [place] DBRDMT 3+ Right 2017    Procedure: ARTHROSCOPY SHOULDER;  Surgeon: Lisha Castro MD;  [facility];  Service: Orthopedics   • TONSILLECTOMY         FAMILY HISTORY:  Family History   Problem Relation Age of Onset   • Cancer Father         Brain tumor   • Heart disease Father    • Hyperlipidemia Father    • Hypertension Father    • Brain cancer Father    • Migraines Mother    • Cancer Brother         Melanoma       SOCIAL HISTORY:  Social History     Socioeconomic History   • Marital status: /Civil Union     Spouse name: Not on file   • Number of children: Not on file   • Years of education: Not on file   • Highest education level: Not on file   Occupational History   • Occupation: Part time   Tobacco Use   • Smoking status: Former     Packs/day: 0 25     Years: 30 00     Pack years: 7 50     Types: Cigarettes     Start date: 5     Quit date:      Years since quittin 2   • Smokeless tobacco: Never   Vaping Use   • Vaping Use: Never used   Substance and Sexual Activity   • Alcohol use: Not Currently     Alcohol/week: 0 0 standard drinks     Comment: social   • Drug use: No   • Sexual activity: Yes     Partners: Female     Birth control/protection: Condom Male     Comment: Denied high risk sexual behavior   Other Topics Concern   • Not on file   Social History Narrative   • Not on file     Social Determinants of Health     Financial Resource Strain: Not on file   Food Insecurity: Not on file   Transportation Needs: Not on file   Physical Activity: Not on file   Stress: Not on file   Social Connections: Not on file   Intimate Partner Violence: Not on file   Housing Stability: Not on file       Review of Systems   Respiratory: Negative for shortness of breath  Cardiovascular: Negative for chest pain  Gastrointestinal: Negative for abdominal pain  Objective:  Vitals:    03/22/23 0851   BP: 154/78   BP Location: Left arm   Patient Position: Sitting   Cuff Size: Adult   Pulse: (!) 52   Resp: 16   SpO2: 95%   Weight: 99 2 kg (218 lb 12 8 oz)   Height: 6' (1 829 m)     Body mass index is 29 67 kg/m²  Physical Exam  Vitals and nursing note reviewed  Constitutional:       Appearance: He is well-developed  Cardiovascular:      Rate and Rhythm: Normal rate and regular rhythm  Heart sounds: Normal heart sounds  Pulmonary:      Effort: Pulmonary effort is normal       Breath sounds: Normal breath sounds  Neurological:      Mental Status: He is alert and oriented to person, place, and time  RESULTS:    In chart    This note was created with voice recognition software  Phonic, grammatical and spelling errors may be present within the note as a result

## 2023-03-24 ENCOUNTER — TELEPHONE (OUTPATIENT)
Dept: GASTROENTEROLOGY | Facility: CLINIC | Age: 73
End: 2023-03-24

## 2023-03-24 NOTE — TELEPHONE ENCOUNTER
Patients GI provider:  Dr Lavinia Souza     Number to return call: [phone]    Reason for call: Pt FAILED OA - on blood thinners- had stent placed Sept 2022    Scheduled procedure/appointment date if applicable: Appt: 5/33/98

## 2023-05-16 ENCOUNTER — OFFICE VISIT (OUTPATIENT)
Dept: INTERNAL MEDICINE CLINIC | Facility: CLINIC | Age: 73
End: 2023-05-16

## 2023-05-16 ENCOUNTER — OFFICE VISIT (OUTPATIENT)
Dept: GASTROENTEROLOGY | Facility: CLINIC | Age: 73
End: 2023-05-16

## 2023-05-16 VITALS
TEMPERATURE: 98.1 F | HEIGHT: 72 IN | RESPIRATION RATE: 16 BRPM | DIASTOLIC BLOOD PRESSURE: 70 MMHG | WEIGHT: 214.2 LBS | HEART RATE: 54 BPM | BODY MASS INDEX: 29.01 KG/M2 | OXYGEN SATURATION: 97 % | SYSTOLIC BLOOD PRESSURE: 130 MMHG

## 2023-05-16 VITALS
BODY MASS INDEX: 29.15 KG/M2 | OXYGEN SATURATION: 97 % | HEIGHT: 72 IN | DIASTOLIC BLOOD PRESSURE: 66 MMHG | WEIGHT: 215.2 LBS | HEART RATE: 55 BPM | SYSTOLIC BLOOD PRESSURE: 132 MMHG

## 2023-05-16 DIAGNOSIS — M79.89 LEFT LEG SWELLING: Primary | ICD-10-CM

## 2023-05-16 DIAGNOSIS — Z12.11 COLON CANCER SCREENING: Primary | ICD-10-CM

## 2023-05-16 NOTE — PROGRESS NOTES
Assessment/Plan:   Patient Instructions   Have ultrasound of left leg done tomorrow as planned  Make sure we see the results  If negative I would anticipate starting low-dose diuretic to see if we can get some of the swelling out of the legs  No other medication changes at this time  Follow-up before your anticipated move  Quality Measures:       No follow-ups on file  Diagnoses and all orders for this visit:    Left leg swelling        Subjective:      Patient ID: Han Warren is a 81 y o  male  Acute visit    Increase swelling left leg which is the leg he had a DVT in in February  He is currently on both Eliquis and Plavix  Not complaining of any significant pain  Has not added any salt to food but admits he could be eating some salty type foods  No shortness of breath but has had a slight cough over the past 2 weeks  No fever or chills  Occasional anterior chest pain that lasts only seconds  Not typical of his previous anginal pain prior to his stent insertion  ALLERGIES:  Allergies   Allergen Reactions   • Amlodipine Swelling   • Lisinopril      Other reaction(s): Cough  Other reaction(s): Cough       CURRENT MEDICATIONS:    Current Outpatient Medications:   •  apixaban (Eliquis) 5 mg, Take 2 tablets twice daily for 7 days, then 1 tablet twice daily  , Disp: 74 tablet, Rfl: 5  •  atenolol (TENORMIN) 50 mg tablet, TAKE 1 TABLET DAILY, Disp: 90 tablet, Rfl: 3  •  atorvastatin (LIPITOR) 40 mg tablet, Take 1 tablet (40 mg total) by mouth daily, Disp: 30 tablet, Rfl: 6  •  clopidogrel (PLAVIX) 75 mg tablet, TAKE 1 TABLET BY MOUTH EVERY DAY, Disp: 90 tablet, Rfl: 1  •  esomeprazole (NexIUM) 20 mg capsule, Take 20 mg by mouth every morning before breakfast, Disp: , Rfl:   •  FLUoxetine (PROzac) 20 mg capsule, TAKE 1 CAPSULE BY MOUTH EVERY DAY, Disp: 90 capsule, Rfl: 3  •  fluticasone (FLONASE) 50 mcg/act nasal spray, 1 spray into each nostril daily, Disp: , Rfl:   •  losartan (COZAAR) 25 mg tablet, TAKE 1 TABLET (25 MG TOTAL) BY MOUTH DAILY  , Disp: 90 tablet, Rfl: 1  •  tamsulosin (FLOMAX) 0 4 mg, TAKE 1 CAPSULE BY MOUTH IN THE MORNING, Disp: 30 capsule, Rfl: 5    ACTIVE PROBLEM LIST:  Patient Active Problem List   Diagnosis   • Complete tear of left rotator cuff   • Biceps tendinitis   • Benign hypertension   • Allergic rhinitis   • Hyperlipidemia   • Depression   • Impaired fasting glucose   • Vitamin D deficiency   • Bilateral primary osteoarthritis of knee   • Primary osteoarthritis of right knee   • Primary osteoarthritis of left knee   • Chronic venous insufficiency   • Stable angina ([facility] [address] )   • Coronary artery disease involving native coronary artery of native heart without angina pectoris   • Status post insertion of drug-eluting stent into left anterior descending (LAD) artery   • Acute deep vein thrombosis (DVT) of femoral vein of left lower extremity (HCC)   • Lung nodule, multiple   • Colon cancer screening       PAST MEDICAL HISTORY:  Past Medical History:   Diagnosis Date   • Arthritis 2012   • Depression 2012   • Hyperlipidemia    • Hypertension        PAST SURGICAL HISTORY:  Past Surgical History:   Procedure Laterality Date   • ABDOMINAL SURGERY     • CARDIAC CATHETERIZATION N/A 9/23/2022    Procedure: Cardiac catheterization;  Surgeon: Rita Beard MD;  [facility]; Service: Cardiology   • CARDIAC CATHETERIZATION N/A 9/23/2022    Procedure: Cardiac Coronary Angiogram;  Surgeon: Rita Beard MD;  Location: [address] [place] [address] CATH LAB; Service: Cardiology   • CARDIAC CATHETERIZATION Left 9/23/2022    Procedure: Cardiac Left Heart Cath;  Surgeon: Rita Beard MD;  [facility]; Service: Cardiology   • CARDIAC CATHETERIZATION N/A 9/23/2022    Procedure: Cardiac pci;  Surgeon: Rita Beard MD;  Location: [address] [place] [address] CATH LAB;   Service: Cardiology   • KNEE ARTHROSCOPY     • NASAL SINUS SURGERY     • [place] SURGICAL ARTHROSCOPY SHOULDER BICEPS TENODESIS Right 2017    Procedure: ARTHROSCOPIC BICEPS TENODESIS;  Surgeon: Rex Onofre MD;  [facility];  Service: Orthopedics   • [place] SURGICAL ARTHROSCOPY SHOULDER W/ROTATOR CUFF RPR Right 2017    Procedure: SHOULDER ARTHROSCOPY ROTATOR CUFF REPAIR ;  Surgeon: Rex Onofre MD;  [facility];  Service: Orthopedics   • [place] SURGICAL ARTHROSCOPY [place] DBRDMT 3+ Right 2017    Procedure: ARTHROSCOPY SHOULDER;  Surgeon: Rex Onofre MD;  [facility];  Service: Orthopedics   • TONSILLECTOMY         FAMILY HISTORY:  Family History   Problem Relation Age of Onset   • Cancer Father         Brain tumor   • Heart disease Father    • Hyperlipidemia Father    • Hypertension Father    • Brain cancer Father    • Migraines Mother    • Cancer Brother         Melanoma       SOCIAL HISTORY:  Social History     Socioeconomic History   • Marital status: /Civil Union     Spouse name: Not on file   • Number of children: Not on file   • Years of education: Not on file   • Highest education level: Not on file   Occupational History   • Occupation: Part time   Tobacco Use   • Smoking status: Former     Packs/day: 0 25     Years: 30 00     Pack years: 7 50     Types: Cigarettes     Start date: 5     Quit date:      Years since quittin 3   • Smokeless tobacco: Never   Vaping Use   • Vaping Use: Never used   Substance and Sexual Activity   • Alcohol use: Not Currently     Alcohol/week: 0 0 standard drinks     Comment: social   • Drug use: No   • Sexual activity: Yes     Partners: Female     Birth control/protection: Condom Male     Comment: Denied high risk sexual behavior   Other Topics Concern   • Not on file   Social History Narrative   • Not on file     Social Determinants of Health     Financial Resource Strain: Not on file   Food Insecurity: Not on file   Transportation Needs: Not on file   Physical Activity: Not on file   Stress: Not on file Social Connections: Not on file   Intimate Partner Violence: Not on file   Housing Stability: Not on file       Review of Systems   Constitutional: Negative for activity change, chills, fatigue and fever  HENT: Negative for congestion  Eyes: Negative for discharge  Respiratory: Negative for cough, chest tightness and shortness of breath  Cardiovascular: Positive for leg swelling  Negative for chest pain and palpitations  Gastrointestinal: Negative for abdominal pain  Genitourinary: Negative for difficulty urinating  Musculoskeletal: Negative for arthralgias and myalgias  Skin: Negative for rash  Allergic/Immunologic: Negative for immunocompromised state  Neurological: Negative for dizziness, syncope, weakness, light-headedness and headaches  Hematological: Negative for adenopathy  Does not bruise/bleed easily  Psychiatric/Behavioral: Negative for dysphoric mood  The patient is not nervous/anxious  Objective:  Vitals:    05/16/23 1037 05/16/23 1102   BP: 156/74 130/70   BP Location: Left arm Left arm   Patient Position: Sitting Sitting   Cuff Size: Adult    Pulse: (!) 54    Resp: 16    Temp: 98 1 °F (36 7 °C)    TempSrc: Tympanic    SpO2: 97%    Weight: 97 2 kg (214 lb 3 2 oz)    Height: 6' (1 829 m)      Body mass index is 29 05 kg/m²  Physical Exam  Vitals and nursing note reviewed  Constitutional:       General: He is not in acute distress  Appearance: He is well-developed  He is not ill-appearing  HENT:      Head: Normocephalic and atraumatic  Eyes:      Extraocular Movements: Extraocular movements intact  Pupils: Pupils are equal, round, and reactive to light  Neck:      Thyroid: No thyromegaly  Vascular: No carotid bruit or JVD  Cardiovascular:      Rate and Rhythm: Normal rate and regular rhythm  Heart sounds: Normal heart sounds  Pulmonary:      Effort: Pulmonary effort is normal  No respiratory distress        Breath sounds: Normal breath sounds  Musculoskeletal:      Cervical back: Neck supple  Right lower leg: Edema (2+ edema of foot and proximal ankle) present  Left lower leg: Edema (2+ pitting edema to the proximal pretibial region) present  Comments: Homans negative, no deep calf tenderness  Tenderness present mid pretibial region along peroneal nerve  No neurovascular compromise  DP pulse 2+   Lymphadenopathy:      Cervical: No cervical adenopathy  Skin:     General: Skin is warm and dry  Findings: No rash  Neurological:      General: No focal deficit present  Mental Status: He is alert and oriented to person, place, and time  Mental status is at baseline  Psychiatric:         Mood and Affect: Mood normal          Behavior: Behavior normal            RESULTS:    In chart    This note was created with voice recognition software  Phonic, grammatical and spelling errors may be present within the note as a result

## 2023-05-16 NOTE — PATIENT INSTRUCTIONS
Have ultrasound of left leg done tomorrow as planned  Make sure we see the results  If negative I would anticipate starting low-dose diuretic to see if we can get some of the swelling out of the legs  No other medication changes at this time  Follow-up before your anticipated move

## 2023-05-16 NOTE — PROGRESS NOTES
Consultation - [facility]  Kymberly Joaquin 1950 81 y o  male     ASSESSMENT @ PLAN:   He is a 82-year-old male that needs to have colon cancer screening  He had coronary artery disease stents placed in September and is on Plavix  He also is on Eliquis for DVT as well    He had a normal colonoscopy with me in September 2012 with no polyps and diverticulosis    1 I advised him to have his colonoscopy in September when he comes off of his Plavix  He will be moving to [place] this summer and will schedule his colonoscopy there    Chief Complaint: Colon cancer screening    HPI: He is a 82-year-old male that needs colon cancer screening  He had a colonoscopy with me in September 2012 with no polyps and diverticulosis  Nobody in the family had colon cancer  In the fall in September he had a coronary artery stent placed and is on Plavix  This was in September 2022  Also he had a DVT in the lower extremity and is on Eliquis  He has no fevers chills nausea vomiting diarrhea constipation melena or hematochezia  His weight is stable  He is retired and his wife is retiring and he will be moving to [place]  REVIEW OF SYSTEMS:     CONSTITUTIONAL: Denies any fever, chills, or rigors  Good appetite, and no recent weight loss  HEENT: No earache or tinnitus  Denies hearing loss or visual disturbances  CARDIOVASCULAR: No chest pain or palpitations  RESPIRATORY: Denies any cough, hemoptysis, shortness of breath or dyspnea on exertion  GASTROINTESTINAL: As noted in the History of Present Illness  GENITOURINARY: No problems with urination  Denies any hematuria or dysuria  NEUROLOGIC: No dizziness or vertigo, denies headaches  MUSCULOSKELETAL: Denies any muscle or joint pain  SKIN: Denies skin rashes or itching  ENDOCRINE: Denies excessive thirst  Denies intolerance to heat or cold  PSYCHOSOCIAL: Denies depression or anxiety  Denies any recent memory loss       Past Medical History: Diagnosis Date   • Arthritis 2012   • Depression 2012   • Hyperlipidemia    • Hypertension       Past Surgical History:   Procedure Laterality Date   • ABDOMINAL SURGERY     • CARDIAC CATHETERIZATION N/A 9/23/2022    Procedure: Cardiac catheterization;  Surgeon: Carlo Yu MD;  [facility]; Service: Cardiology   • CARDIAC CATHETERIZATION N/A 9/23/2022    Procedure: Cardiac Coronary Angiogram;  Surgeon: Carlo Yu MD;  Location: [address] [place] [address] CATH LAB; Service: Cardiology   • CARDIAC CATHETERIZATION Left 9/23/2022    Procedure: Cardiac Left Heart Cath;  Surgeon: Carlo Yu MD;  [facility]; Service: Cardiology   • CARDIAC CATHETERIZATION N/A 9/23/2022    Procedure: Cardiac pci;  Surgeon: Carlo Yu MD;  Location: [address] [place] [address] CATH LAB;   Service: Cardiology   • KNEE ARTHROSCOPY     • NASAL SINUS SURGERY     • CO SURGICAL ARTHROSCOPY SHOULDER BICEPS TENODESIS Right 1/25/2017    Procedure: ARTHROSCOPIC BICEPS TENODESIS;  Surgeon: Cris Shelton MD;  Location: [facility] MAIN OR;  Service: Orthopedics   • CO SURGICAL ARTHROSCOPY SHOULDER W/ROTATOR CUFF RPR Right 1/25/2017    Procedure: SHOULDER ARTHROSCOPY ROTATOR CUFF REPAIR ;  Surgeon: Cris Shelton MD;  [facility]: [facility];  Service: Orthopedics   • CO SURGICAL ARTHROSCOPY Hugo Devi DBRD[facility] 3+ Right 1/25/2017    Procedure: ARTHROSCOPY SHOULDER;  Surgeon: Cris Shelton MD;  [facility];  Service: Orthopedics   • TONSILLECTOMY       Social History     Socioeconomic History   • Marital status: /Civil Union     Spouse name: Not on file   • Number of children: Not on file   • Years of education: Not on file   • Highest education level: Not on file   Occupational History   • Occupation: Part time   Tobacco Use   • Smoking status: Former     Packs/day: 0 25     Years: 30 00     Pack years: 7 50     Types: Cigarettes     Start date: 5     Quit date: 1982     Years since quitting: 41 3   • Smokeless tobacco: Never   Vaping Use   • Vaping Use: Never used   Substance and Sexual Activity   • Alcohol use: Not Currently     Alcohol/week: 0 0 standard drinks     Comment: social   • Drug use: No   • Sexual activity: Yes     Partners: Female     Birth control/protection: Condom Male     Comment: Denied high risk sexual behavior   Other Topics Concern   • Not on file   Social History Narrative   • Not on file     Social Determinants of Health     Financial Resource Strain: Not on file   Food Insecurity: Not on file   Transportation Needs: Not on file   Physical Activity: Not on file   Stress: Not on file   Social Connections: Not on file   Intimate Partner Violence: Not on file   Housing Stability: Not on file     Family History   Problem Relation Age of Onset   • Cancer Father         Brain tumor   • Heart disease Father    • Hyperlipidemia Father    • Hypertension Father    • Brain cancer Father    • Migraines Mother    • Cancer Brother         Melanoma     Amlodipine and Lisinopril  Current Outpatient Medications   Medication Sig Dispense Refill   • apixaban (Eliquis) 5 mg Take 2 tablets twice daily for 7 days, then 1 tablet twice daily  74 tablet 5   • atenolol (TENORMIN) 50 mg tablet TAKE 1 TABLET DAILY 90 tablet 3   • atorvastatin (LIPITOR) 40 mg tablet Take 1 tablet (40 mg total) by mouth daily 30 tablet 6   • clopidogrel (PLAVIX) 75 mg tablet TAKE 1 TABLET BY MOUTH EVERY DAY 90 tablet 1   • esomeprazole (NexIUM) 20 mg capsule Take 20 mg by mouth every morning before breakfast     • FLUoxetine (PROzac) 20 mg capsule TAKE 1 CAPSULE BY MOUTH EVERY DAY 90 capsule 3   • fluticasone (FLONASE) 50 mcg/act nasal spray 1 spray into each nostril daily     • losartan (COZAAR) 25 mg tablet TAKE 1 TABLET (25 MG TOTAL) BY MOUTH DAILY  90 tablet 1   • tamsulosin (FLOMAX) 0 4 mg TAKE 1 CAPSULE BY MOUTH IN THE MORNING 30 capsule 5     No current facility-administered medications for this visit         Blood pressure 132/66, pulse 55, height 6' (1 829 m), weight 97 6 kg (215 lb 3 2 oz), SpO2 97 %  PHYSICAL EXAM:     General Appearance:   Alert, cooperative, no distress, appears stated age    HEENT:   Normocephalic, atraumatic, anicteric      Neck:  Supple, symmetrical, trachea midline, no adenopathy;    thyroid: no enlargement/tenderness/nodules; no carotid  bruit or JVD    Lungs:   Clear to auscultation bilaterally; no rales, rhonchi or wheezing; respirations unlabored    Heart[de-identified]   S1 and S2 normal; regular rate and rhythm; no murmur, rub, or gallop  Abdomen:   Soft, non-tender, non-distended; normal bowel sounds; no masses, no organomegaly    Genitalia:   Deferred    Rectal:   Deferred    Extremities:  No cyanosis, clubbing or edema    Pulses:  2+ and symmetric all extremities    Skin:  Skin color, texture, turgor normal, no rashes or lesions    Lymph nodes:  No palpable cervical, axillary or inguinal lymphadenopathy        Lab Results   Component Value Date    WBC 7 95 01/20/2023    HGB 15 6 01/20/2023    HCT 46 3 01/20/2023    MCV 92 01/20/2023     01/20/2023     Lab Results   Component Value Date    GLUCOSE 95 10/09/2015    CALCIUM 9 0 01/20/2023     10/09/2015    K 4 5 01/20/2023    CO2 29 01/20/2023     01/20/2023    BUN 18 01/20/2023    CREATININE 1 05 01/20/2023     Lab Results   Component Value Date    ALT 24 01/20/2023    AST 21 01/20/2023    ALKPHOS 56 01/20/2023    BILITOT 1 09 (H) 10/09/2015     Lab Results   Component Value Date    INR 1 00 09/22/2022    INR 0 94 09/07/2022    PROTIME 13 0 09/22/2022    PROTIME 12 4 09/07/2022           Answers for HPI/ROS submitted by the patient on 5/15/2023  Progression since onset: unchanged  Pain - numeric: 0/10  anorexia: No  arthralgias: No  belching: No  constipation: Yes  diarrhea: Yes  dysuria: No  fever: No  flatus: No  frequency: Yes  headaches: No  hematochezia: No  hematuria: No  melena: No  myalgias: No  nausea:  No  weight loss: No  vomiting: No  Diagnostic workup: CT scan, ultrasound

## 2023-05-17 ENCOUNTER — HOSPITAL ENCOUNTER (OUTPATIENT)
Dept: VASCULAR ULTRASOUND | Facility: HOSPITAL | Age: 73
Discharge: HOME | End: 2023-05-17

## 2023-05-17 DIAGNOSIS — I82.412 ACUTE DEEP VEIN THROMBOSIS (DVT) OF FEMORAL VEIN OF LEFT LOWER EXTREMITY (HCC): ICD-10-CM

## 2023-05-18 DIAGNOSIS — M79.89 LEFT LEG SWELLING: Primary | ICD-10-CM

## 2023-05-18 RX ORDER — HYDROCHLOROTHIAZIDE 12.5 MG/1
12.5 TABLET ORAL DAILY
Qty: 30 TABLET | Refills: 1 | Status: SHIPPED | OUTPATIENT
Start: 2023-05-18 | End: 2023-06-26

## 2023-05-18 NOTE — PROGRESS NOTES
HEMATOLOGY CLINIC NOTE    Primary Care Provider: Nhung Chiu MD  Referring Provider:    MRN: [id number]  : 1950    Assessment / Plan:   1  Acute deep vein thrombosis (DVT) of femoral vein of left lower extremity [facility])  This is a 81year old male with a recent history of DVT of LLE which was in posterior tibial veins, peroneal veins extending into femoral vein  This was unprovoked, first in life  He is taking Eliquis 5mg BID currently  On eliquis since 23  Patient had a thrombosis panel which was unrevealing  Venous Doppler showed chronic nonocclusive changes  Partial resolution of DVT  Given patient's age, unprovoked VTE, extensiveness of clot burden, patient would benefit from lifelong anticoagulation  However, Eliquis may be expensive for patient after his wife retires next month and he will be placed on Medicare  He is also moving to [place] mid   After discussing risk versus benefit, we decided patient would stay on anticoagulation and then see a hematologist in [place] and decide from there after insurance kicks in and it is known what anticoagulation is covered  Patient will be seen as needed in our clinic  However, he will stop by our office prior to going to [place] and we will provide him some samples of Eliquis  Patient understands while being on anticoagulation that should she be in a significant accident such as a car accident and/or hit her head she should report to the ED to rule out fatal bleed  She understands as well that it is rare to have a blood clot on top of anticoagulation  However, should she develop significant chest pain, shortness of breath, leg swelling once again she should report to the ED  2  Colonoscopy due  Patient reminded to have done      · Discussion of decision making    I personally reviewed the following lab results, the image studies, pathology, other specialty/physicians consult notes and recommendations, and outside medical records from [address]  I had a lengthy discussion with the patient and shared the work-up findings  I spent 15 minutes reviewing the records (labs, clinician notes, outside records, medical history, ordering medicine/tests/procedures, interpreting the imaging/labs previously done) and coordination of care as well as direct time with the patient today, of which greater than 50% of the time was spent in counseling and coordination of care with the patient/family  · Plan/Labs  · Continue Eliquis 5mg BID  Ideally, patient should be placed on lifelong AC as above  See discussion  Patient moving to [place] mid June  He will see a hematologist there  He will stop by our office prior to leaving and we will give him Eliquis samples  · Reminded to have colonoscopy done  Follow Up:PRN  Pt moving to [place]  All questions were answered to the patient's satisfaction during this encounter  The patient knows the contact information for our office and knows to reach out for any relevant concerns related to this encounter  They are to call for any temperature 100 4 or higher, new symptoms including but not restricted to shaking chills, decreased appetite, nausea, vomiting, diarrhea, increased fatigue, shortness of breath or chest pain, confusion, and not feeling the strength to come to the clinic  For all other listed problems and medical diagnosis in their chart - they are managed by PCP and/or other specialists, which the patient acknowledges  Thank you very much for your consultation and making us a part of this patient's care  We are continuing to follow closely with you  Please do not hesitate to reach out to me with any additional questions or concerns  Reason for visit:       Chief Complaint   Patient presents with   • Follow-up     History of Hematology Illness:     Lilia Dlalas is a 81 y o  male who came in for consultation      1  Unprovoked proximal/distal DVT LLE, 1st in life  - 2/2023: patient had left lower extremity swelling x 2 weeks  Swelling started in ankle and progressed to mid calf  Patient was ordered a venous doppler showing Acute mostly occlusive deep vein thrombosis in the posterior tibial and peroneal veins extending into the common femoral vein where it is poorly attached  He was started on Eliquis loading dosage  Currently taking Eliquis 5mg BID     - 5/2023: Thrombosis panel unrevealing  Venous doppler --> evidence of chronic, nonocclusive deep vein thrombosis noted in the proximal-distal femoral, popliteal, posterior tibial, peroneal veins  Partial resolution seen  Interval History:   2/9/2023: This is a 81year old male with a PMH of bicep tendinitis, rotator cuff tear, HTN, rhinitis, HLD, depression, Vitamin D deficiency, OA, chronic venous insufficiency, stable angina, CAD, DVT, and more presenting for consultation  Patient denies CP, new SOB, bleeding anywhere  Does have some SOB at baseline that pulmonary is working up  Tolerating Eliquis thus far without complaints  It is affordable for patient at this time  He does take Plavix  Was taken off ASA recently  Plavix is for recent stent placement 9/2022  He has never had a prior VTE  No Fhx VTE  No liver or kidney conditions  No trauma, immobilization, surgery, hospital, travel, exogenous hormone therapy prior to clot  No cancer hx  No CVA history  No autoimmune conditions  Does nor drink  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 [employer]  No cancer hx personally  Father had a brain tumor, brother has melanoma that is metastatic  Due for colonoscopy >10 years since last one     5/19/2023: Patient came in for follow-up  Overall doing well on Eliquis without any bleeding anywhere  Still has some minor leg swelling and left lower extremity  No new leg swelling or other swelling, chest pain, shortness of breath  Here to review recent lab work-up and Doppler      Problem list:       Patient Active Problem List   Diagnosis   • Complete tear of left rotator cuff   • Biceps tendinitis   • Benign hypertension   • Allergic rhinitis   • Hyperlipidemia   • Depression   • Impaired fasting glucose   • Vitamin D deficiency   • Bilateral primary osteoarthritis of knee   • Primary osteoarthritis of right knee   • Primary osteoarthritis of left knee   • Chronic venous insufficiency   • Stable angina (HCC)   • Coronary artery disease involving native coronary artery of native heart without angina pectoris   • Status post insertion of drug-eluting stent into left anterior descending (LAD) artery   • Acute deep vein thrombosis (DVT) of femoral vein of left lower extremity (HCC)   • Lung nodule, multiple   • Colon cancer screening       REVIEW OF SYMPTOMS:   Review of Systems   Constitutional: Negative for activity change, appetite change, chills, diaphoresis, fatigue, fever and unexpected weight change  HENT: Negative for nosebleeds  Respiratory: Negative for apnea, cough, chest tightness and shortness of breath  Cardiovascular: Positive for leg swelling (Left LLE swelling, stable  )  Negative for chest pain and palpitations  Gastrointestinal: Negative for abdominal distention, abdominal pain, anal bleeding, blood in stool, constipation, diarrhea, nausea and vomiting  Endocrine: Negative for cold intolerance  Genitourinary: Negative for hematuria  Skin: Negative for color change, pallor, rash and wound  Neurological: Negative for dizziness, weakness, light-headedness and headaches  Hematological: Negative for adenopathy  Does not bruise/bleed easily  PHYSICAL EXAMINATION:     Vital Signs: There were no vitals taken for this visit  There is no height or weight on file to calculate BSA     Ht Readings from Last 8 Encounters:   05/16/23 6' (1 829 m)   05/16/23 6' (1 829 m)   03/22/23 6' (1 829 m)   03/09/23 6' (1 829 m)   02/16/23 6' (1 829 m)   02/07/23 6' (1 829 m)   01/24/23 6' (1 829 m)   01/20/23 6' (1 829 m)       Kenny Readings from Last 8 Encounters:   05/16/23 97 2 kg (214 lb 3 2 oz)   05/16/23 97 6 kg (215 lb 3 2 oz)   03/22/23 99 2 kg (218 lb 12 8 oz)   03/09/23 97 5 kg (215 lb)   02/16/23 98 4 kg (217 lb)   02/07/23 97 1 kg (214 lb)   01/24/23 96 6 kg (213 lb)   01/20/23 97 1 kg (214 lb)          Physical Exam  Constitutional:       General: He is not in acute distress  Appearance: Normal appearance  He is normal weight  He is not ill-appearing, toxic-appearing or diaphoretic  HENT:      Head: Normocephalic and atraumatic  Eyes:      General: No scleral icterus  Extraocular Movements: Extraocular movements intact  Conjunctiva/sclera: Conjunctivae normal    Cardiovascular:      Rate and Rhythm: Normal rate and regular rhythm  Pulmonary:      Effort: Pulmonary effort is normal  No respiratory distress  Abdominal:      Tenderness: There is no abdominal tenderness  Musculoskeletal:      Cervical back: Normal range of motion and neck supple  Right lower leg: No edema  Left lower leg: Edema (mild, stable  chronic ) present  Lymphadenopathy:      Cervical: No cervical adenopathy  Skin:     General: Skin is warm and dry  Coloration: Skin is not jaundiced or pale  Findings: No bruising, erythema, lesion or rash  Neurological:      General: No focal deficit present  Mental Status: He is alert and oriented to person, place, and time  Mental status is at baseline  Psychiatric:         Mood and Affect: Mood normal          Behavior: Behavior normal          Thought Content: Thought content normal          Judgment: Judgment normal          Reviewed historical information        PAST MEDICAL HISTORY:    Past Medical History:   Diagnosis Date   • Arthritis 2012   • Depression 2012   • Hyperlipidemia    • Hypertension        PAST SURGICAL HISTORY:    Past Surgical History:   Procedure Laterality Date   • ABDOMINAL SURGERY     • CARDIAC CATHETERIZATION N/A 9/23/2022    Procedure: Cardiac catheterization;  Surgeon: Nikolay Hdz MD;  [facility]; Service: Cardiology   • CARDIAC CATHETERIZATION N/A 9/23/2022    Procedure: Cardiac Coronary Angiogram;  Surgeon: Nikolay Hdz MD;  Location: [address] [place] [address] CATH LAB; Service: Cardiology   • CARDIAC CATHETERIZATION Left 9/23/2022    Procedure: Cardiac Left Heart Cath;  Surgeon: Nikolay Hdz MD;  [facility]; Service: Cardiology   • CARDIAC CATHETERIZATION N/A 9/23/2022    Procedure: Cardiac pci;  Surgeon: Nikolay Hdz MD;  Location: [address] [place] [address] CATH LAB; Service: Cardiology   • KNEE ARTHROSCOPY     • NASAL SINUS SURGERY     • KS SURGICAL ARTHROSCOPY SHOULDER BICEPS TENODESIS Right 1/25/2017    Procedure: ARTHROSCOPIC BICEPS TENODESIS;  Surgeon: Misael Hinojosa MD;  [facility];  Service: Orthopedics   • KS SURGICAL ARTHROSCOPY SHOULDER W/ROTATOR CUFF RPR Right 1/25/2017    Procedure: SHOULDER ARTHROSCOPY ROTATOR CUFF REPAIR ;  Surgeon: Misael Hinoojsa MD;  Location: [facility];  Service: Orthopedics   • KS SURGICAL ARTHROSCOPY [facility] 3+ Right 1/25/2017    Procedure: ARTHROSCOPY SHOULDER;  Surgeon: Misael Hinojosa MD;  [facility]: [facility];  Service: Orthopedics   • TONSILLECTOMY           CURRENT MEDICATIONS:     Current Outpatient Medications:   •  hydrochlorothiazide (HYDRODIURIL) 12 5 mg tablet, Take 1 tablet (12 5 mg total) by mouth daily, Disp: 30 tablet, Rfl: 1  •  apixaban (Eliquis) 5 mg, Take 2 tablets twice daily for 7 days, then 1 tablet twice daily  , Disp: 74 tablet, Rfl: 5  •  atenolol (TENORMIN) 50 mg tablet, TAKE 1 TABLET DAILY, Disp: 90 tablet, Rfl: 3  •  atorvastatin (LIPITOR) 40 mg tablet, Take 1 tablet (40 mg total) by mouth daily, Disp: 30 tablet, Rfl: 6  •  clopidogrel (PLAVIX) 75 mg tablet, TAKE 1 TABLET BY MOUTH EVERY DAY, Disp: 90 tablet, Rfl: 1  •  esomeprazole (NexIUM) 20 mg capsule, Take 20 mg by mouth every morning before breakfast, Disp: , Rfl: •  FLUoxetine (PROzac) 20 mg capsule, TAKE 1 CAPSULE BY MOUTH EVERY DAY, Disp: 90 capsule, Rfl: 3  •  fluticasone (FLONASE) 50 mcg/act nasal spray, 1 spray into each nostril daily, Disp: , Rfl:   •  losartan (COZAAR) 25 mg tablet, TAKE 1 TABLET (25 MG TOTAL) BY MOUTH DAILY  , Disp: 90 tablet, Rfl: 1  •  tamsulosin (FLOMAX) 0 4 mg, TAKE 1 CAPSULE BY MOUTH IN THE MORNING, Disp: 30 capsule, Rfl: 5    Family History   Social History     Tobacco Use   • Smoking status: Former     Packs/day: 0 25     Years: 30 00     Pack years: 7 50     Types: Cigarettes     Start date: 5     Quit date:      Years since quittin 4   • Smokeless tobacco: Never   Vaping Use   • Vaping Use: Never used   Substance Use Topics   • Alcohol use: Not Currently     Alcohol/week: 0 0 standard drinks     Comment: social   • Drug use: No       HGB 15 6  Family History   Problem Relation Age of Onset   • Cancer Father         Brain tumor   • Heart disease Father    • Hyperlipidemia Father    • Hypertension Father    • Brain cancer Father    • Migraines Mother    • Cancer Brother         Melanoma    2023    HCT 46 3 2023    MCV 92 2023     2023   sults   Component Value Date    WBC 7 95 2023    HGB 15 6 2023    HCT 46 3 2023    MCV 92 2023     2023      Component Value Date     10/09/2015    SODIUM 141 2023    K 4 5 2023     2023    CO2 29 2023    ANIONGAP 6 10/09/2015    AGAP 7 2023    BUN 18 2023    CREATININE 1 05 2023    GLUC 112 2022    GLUF 109 (H) 2023    CALCIUM 9 0 2023    AST 21 2023    ALT 24 2023    ALKPHOS 56 2023    PROT 7 1 10/09/2015    TP 6 6 2023    BILITOT 1 09 (H) 10/09/2015    TBILI 1 06 (H) 2023    EGFR 71 2023       IMAGING:  VAS lower limb venous duplex study, complete bilateral     [facility] REPORT  CLINICAL:  Indications:  Patient presents to determine propagation vs resolution of previously noted  extensive DVT left lower extremity discovered on 02/08/2023  Patient reports he  still has left lower extremity edema  Operative History:  2022-09-22 Cardiac stent  Risk Factors  The patient has history of HTN, Hyperlipidemia, CAD and DVT  FINDINGS:     Left        Impression                             FV Prox     E1  Non Occlusive Thrombus (Chronic)    FV Mid      E1  Non Occlusive Thrombus (Chronic)    FV Dist     E1  Non Occlusive Thrombus (Chronic)    Popliteal   E1  Non Occlusive Thrombus (Chronic)    PostTibial  E1  Non Occlusive Thrombus (Chronic)    Peroneal    E1  Non Occlusive Thrombus (Chronic)             CONCLUSION:     Impression:  RIGHT LOWER LIMB:  No evidence of acute or chronic deep vein thrombosis  No evidence of superficial thrombophlebitis noted  Doppler evaluation shows a normal response to augmentation maneuvers     Popliteal, posterior tibial and anterior tibial arterial Doppler waveform's are  triphasic  There is a well defined hypoechoic non-vascularized cystic-type structure noted  in the popliteal fossa  LEFT LOWER LIMB:  No evidence of acute deep vein thrombosis  Evidence of chronic, non-occlusive deep vein thrombosis noted in the  proximal-distal femoral, popliteal, posterior tibial, and peroneal veins  No evidence of superficial thrombophlebitis noted  Doppler evaluation shows a normal response to augmentation maneuvers  Popliteal, posterior tibial and anterior tibial arterial Doppler waveform's are  triphasic  In comparison to the study on 02/08/2023, the extensive, occlusive left lower  extremity DVT has partially resolved and appears non-occlusive  Technical findings were faxed to chart       SIGNATURE:  Electronically Signed by: Latasha Jones MD, [address] on [id number] 03:26:09 PM

## 2023-05-19 ENCOUNTER — OFFICE VISIT (OUTPATIENT)
Dept: HEMATOLOGY ONCOLOGY | Facility: CLINIC | Age: 73
End: 2023-05-19

## 2023-05-19 VITALS
BODY MASS INDEX: 29.12 KG/M2 | HEIGHT: 72 IN | HEART RATE: 78 BPM | SYSTOLIC BLOOD PRESSURE: 128 MMHG | DIASTOLIC BLOOD PRESSURE: 64 MMHG | OXYGEN SATURATION: 97 % | RESPIRATION RATE: 16 BRPM | WEIGHT: 215 LBS | TEMPERATURE: 98.2 F

## 2023-05-19 DIAGNOSIS — I82.412 ACUTE DEEP VEIN THROMBOSIS (DVT) OF FEMORAL VEIN OF LEFT LOWER EXTREMITY (HCC): Primary | ICD-10-CM

## 2023-05-19 DIAGNOSIS — Z12.11 ENCOUNTER FOR SCREENING COLONOSCOPY: ICD-10-CM

## 2023-06-13 ENCOUNTER — OFFICE VISIT (OUTPATIENT)
Dept: INTERNAL MEDICINE CLINIC | Facility: CLINIC | Age: 73
End: 2023-06-13
Payer: COMMERCIAL

## 2023-06-13 VITALS
OXYGEN SATURATION: 96 % | RESPIRATION RATE: 16 BRPM | HEIGHT: 72 IN | HEART RATE: 54 BPM | SYSTOLIC BLOOD PRESSURE: 138 MMHG | DIASTOLIC BLOOD PRESSURE: 58 MMHG | BODY MASS INDEX: 28.04 KG/M2 | WEIGHT: 207 LBS

## 2023-06-13 DIAGNOSIS — F32.89 OTHER DEPRESSION: ICD-10-CM

## 2023-06-13 DIAGNOSIS — I10 BENIGN HYPERTENSION: Primary | ICD-10-CM

## 2023-06-13 DIAGNOSIS — Z12.5 SCREENING FOR PROSTATE CANCER: ICD-10-CM

## 2023-06-13 DIAGNOSIS — R73.01 IMPAIRED FASTING GLUCOSE: ICD-10-CM

## 2023-06-13 DIAGNOSIS — I25.10 CORONARY ARTERY DISEASE INVOLVING NATIVE CORONARY ARTERY OF NATIVE HEART WITHOUT ANGINA PECTORIS: ICD-10-CM

## 2023-06-13 DIAGNOSIS — I82.412 ACUTE DEEP VEIN THROMBOSIS (DVT) OF FEMORAL VEIN OF LEFT LOWER EXTREMITY (HCC): ICD-10-CM

## 2023-06-13 DIAGNOSIS — E78.00 PURE HYPERCHOLESTEROLEMIA: ICD-10-CM

## 2023-06-13 DIAGNOSIS — N40.0 BENIGN PROSTATIC HYPERPLASIA WITHOUT LOWER URINARY TRACT SYMPTOMS: ICD-10-CM

## 2023-06-13 PROCEDURE — 99214 OFFICE O/P EST MOD 30 MIN: CPT | Performed by: INTERNAL MEDICINE

## 2023-06-13 RX ORDER — FLUOXETINE 10 MG/1
10 CAPSULE ORAL DAILY
Qty: 30 CAPSULE | Refills: 0 | Status: SHIPPED | OUTPATIENT
Start: 2023-06-13

## 2023-06-13 RX ORDER — TAMSULOSIN HYDROCHLORIDE 0.4 MG/1
0.8 CAPSULE ORAL EVERY MORNING
Qty: 60 CAPSULE | Refills: 5 | Status: SHIPPED | OUTPATIENT
Start: 2023-06-13

## 2023-06-13 NOTE — PROGRESS NOTES
Assessment/Plan:     Chronic problems appear stable  He is going to try weaning off of the fluoxetine  For his BPH issues, he is first going to see if he has any different stopping the Flomax  But the other option is to double the dose  He will get his labs done  Follow-up 1 more time here before he moves down to [place]  Quality Measures:       Return if symptoms worsen or fail to improve  No problem-specific Assessment & Plan notes found for this encounter  Diagnoses and all orders for this visit:    Benign hypertension    Pure hypercholesterolemia    Coronary artery disease involving native coronary artery of native heart without angina pectoris    Acute deep vein thrombosis (DVT) of femoral vein of left lower extremity (HCC)    Impaired fasting glucose    Other depression  -     FLUoxetine (PROzac) 10 mg capsule; Take 1 capsule (10 mg total) by mouth daily    Benign prostatic hyperplasia without lower urinary tract symptoms  -     tamsulosin (FLOMAX) 0 4 mg; Take 2 capsules (0 8 mg total) by mouth every morning    Screening for prostate cancer  -     PSA, Total Screen; Future          Subjective:      Patient ID: Yolis Sabillon is a 81 y o  male  Patient comes in today for routine follow-up  He was supposed to be moving down to [place] but the house sale up here fell through  They are still planning to move  He did not get his labs done  Asking if he could go without his Prozac  He is not sure it is really needed at this point and does not want to go on anything else  He also is not sure if the Flomax is working at all  It is confusing because he is on the water pill for now because of the swelling in his left lower leg from the DVT  But overall that is improving  No other complaints today  No further additions to his history        ALLERGIES:  Allergies   Allergen Reactions   • Amlodipine Swelling   • Lisinopril      Other reaction(s): Cough  Other reaction(s): Cough       CURRENT MEDICATIONS:    Current Outpatient Medications:   •  apixaban (Eliquis) 5 mg, Take 2 tablets twice daily for 7 days, then 1 tablet twice daily  , Disp: 74 tablet, Rfl: 5  •  atenolol (TENORMIN) 50 mg tablet, TAKE 1 TABLET DAILY, Disp: 90 tablet, Rfl: 3  •  atorvastatin (LIPITOR) 40 mg tablet, Take 1 tablet (40 mg total) by mouth daily, Disp: 30 tablet, Rfl: 6  •  clopidogrel (PLAVIX) 75 mg tablet, TAKE 1 TABLET BY MOUTH EVERY DAY, Disp: 90 tablet, Rfl: 1  •  esomeprazole (NexIUM) 20 mg capsule, Take 20 mg by mouth every morning before breakfast, Disp: , Rfl:   •  FLUoxetine (PROzac) 10 mg capsule, Take 1 capsule (10 mg total) by mouth daily, Disp: 30 capsule, Rfl: 0  •  fluticasone (FLONASE) 50 mcg/act nasal spray, 1 spray into each nostril daily PRN, Disp: , Rfl:   •  hydrochlorothiazide (HYDRODIURIL) 12 5 mg tablet, Take 1 tablet (12 5 mg total) by mouth daily, Disp: 30 tablet, Rfl: 1  •  losartan (COZAAR) 25 mg tablet, TAKE 1 TABLET (25 MG TOTAL) BY MOUTH DAILY  , Disp: 90 tablet, Rfl: 1  •  tamsulosin (FLOMAX) 0 4 mg, Take 2 capsules (0 8 mg total) by mouth every morning, Disp: 60 capsule, Rfl: 5    ACTIVE PROBLEM LIST:  Patient Active Problem List   Diagnosis   • Complete tear of left rotator cuff   • Biceps tendinitis   • Benign hypertension   • Allergic rhinitis   • Hyperlipidemia   • Depression   • Impaired fasting glucose   • Vitamin D deficiency   • Bilateral primary osteoarthritis of knee   • Primary osteoarthritis of right knee   • Primary osteoarthritis of left knee   • Chronic venous insufficiency   • Stable angina (HCC)   • Coronary artery disease involving native coronary artery of native heart without angina pectoris   • Status post insertion of drug-eluting stent into left anterior descending (LAD) artery   • Acute deep vein thrombosis (DVT) of femoral vein of left lower extremity (HCC)   • Lung nodule, multiple   • Colon cancer screening       PAST MEDICAL HISTORY:  Past Medical History:   Diagnosis Date   • Arthritis 2012   • Depression 2012   • Hyperlipidemia    • Hypertension        PAST SURGICAL HISTORY:  Past Surgical History:   Procedure Laterality Date   • ABDOMINAL SURGERY     • CARDIAC CATHETERIZATION N/A 9/23/2022    Procedure: Cardiac catheterization;  Surgeon: Jovon Tejeda MD;  Location: [facility]; Service: Cardiology   • CARDIAC CATHETERIZATION N/A 9/23/2022    Procedure: Cardiac Coronary Angiogram;  Surgeon: Jovon Tejeda MD;  Location: [address] [place] [address] CATH LAB; Service: Cardiology   • CARDIAC CATHETERIZATION Left 9/23/2022    Procedure: Cardiac Left Heart Cath;  Surgeon: Jovon Tejeda MD;  [facility]: [facility]; Service: Cardiology   • CARDIAC CATHETERIZATION N/A 9/23/2022    Procedure: Cardiac pci;  Surgeon: Jovon Tejeda MD;  Location: [address] [place] [address] CATH LAB;   Service: Cardiology   • KNEE ARTHROSCOPY     • NASAL SINUS SURGERY     • [facility] SURGICAL ARTHROSCOPY SHOULDER BICEPS TENODESIS Right 1/25/2017    Procedure: ARTHROSCOPIC BICEPS TENODESIS;  Surgeon: Germán Holder MD;  Location: [facility];  Service: Orthopedics   • [facility] SURGICAL ARTHROSCOPY SHOULDER W/ROTATOR CUFF RPR Right 1/25/2017    Procedure: SHOULDER ARTHROSCOPY ROTATOR CUFF REPAIR ;  Surgeon: Germán Holder MD;  [facility]: [facility];  Service: Orthopedics   • [facility] SURGICAL ARTHROSCOPY Dane Manriquez DBRDMT 3+ Right 1/25/2017    Procedure: ARTHROSCOPY SHOULDER;  Surgeon: Germán Holder MD;  Location: [facility];  Service: Orthopedics   • TONSILLECTOMY         FAMILY HISTORY:  Family History   Problem Relation Age of Onset   • Cancer Father         Brain tumor   • Heart disease Father    • Hyperlipidemia Father    • Hypertension Father    • Brain cancer Father    • Migraines Mother    • Cancer Brother         Melanoma       SOCIAL HISTORY:  Social History     Socioeconomic History   • Marital status: /Civil Union     Spouse name: Not on file   • Number of children: Not on file   • Years of education: Not on file   • Highest education level: Not on file   Occupational History   • Occupation: Part time   Tobacco Use   • Smoking status: Former     Packs/day: 0 25     Years: 30 00     Total pack years: 7 50     Types: Cigarettes     Start date: 5     Quit date:      Years since quittin 4   • Smokeless tobacco: Never   Vaping Use   • Vaping Use: Never used   Substance and Sexual Activity   • Alcohol use: Not Currently     Alcohol/week: 0 0 standard drinks of alcohol     Comment: social   • Drug use: No   • Sexual activity: Yes     Partners: Female     Birth control/protection: Condom Male     Comment: Denied high risk sexual behavior   Other Topics Concern   • Not on file   Social History Narrative   • Not on file     Social Determinants of Health     Financial Resource Strain: Not on file   Food Insecurity: Not on file   Transportation Needs: Not on file   Physical Activity: Not on file   Stress: Not on file   Social Connections: Not on file   Intimate Partner Violence: Not on file   Housing Stability: Not on file       Review of Systems   Respiratory: Negative for shortness of breath  Cardiovascular: Negative for chest pain  Gastrointestinal: Negative for abdominal pain  Objective:  Vitals:    23 0839   BP: 138/58   BP Location: Left arm   Patient Position: Sitting   Cuff Size: Adult   Pulse: (!) 54   Resp: 16   SpO2: 96%   Weight: 93 9 kg (207 lb)   Height: 6' (1 829 m)     Body mass index is 28 07 kg/m²  Physical Exam  Vitals and nursing note reviewed  Constitutional:       Appearance: He is well-developed  Cardiovascular:      Rate and Rhythm: Normal rate and regular rhythm  Heart sounds: Normal heart sounds  Pulmonary:      Effort: Pulmonary effort is normal       Breath sounds: Normal breath sounds  Abdominal:      Palpations: Abdomen is soft  Tenderness: There is no abdominal tenderness  Musculoskeletal:      Left lower leg: Edema present  Neurological:      Mental Status: He is alert and oriented to person, place, and time  RESULTS:    In chart    This note was created with voice recognition software  Phonic, grammatical and spelling errors may be present within the note as a result

## 2023-06-15 ENCOUNTER — APPOINTMENT (OUTPATIENT)
Dept: LAB | Facility: HOSPITAL | Age: 73
End: 2023-06-15
Payer: COMMERCIAL

## 2023-06-15 DIAGNOSIS — Z12.5 SCREENING FOR PROSTATE CANCER: ICD-10-CM

## 2023-06-15 DIAGNOSIS — I10 BENIGN HYPERTENSION: ICD-10-CM

## 2023-06-15 DIAGNOSIS — M79.89 LEFT LEG SWELLING: ICD-10-CM

## 2023-06-15 DIAGNOSIS — R73.01 IMPAIRED FASTING GLUCOSE: ICD-10-CM

## 2023-06-15 LAB
ALBUMIN SERPL BCP-MCNC: 4.2 G/DL (ref 3.5–5)
ALP SERPL-CCNC: 60 U/L (ref 34–104)
ALT SERPL W P-5'-P-CCNC: 16 U/L (ref 7–52)
ANION GAP SERPL CALCULATED.3IONS-SCNC: 4 MMOL/L (ref 4–13)
AST SERPL W P-5'-P-CCNC: 14 U/L (ref 13–39)
BASOPHILS # BLD AUTO: 0.08 THOUSANDS/ÂΜL (ref 0–0.1)
BASOPHILS NFR BLD AUTO: 1 % (ref 0–1)
BILIRUB SERPL-MCNC: 1.49 MG/DL (ref 0.2–1)
BUN SERPL-MCNC: 18 MG/DL (ref 5–25)
CALCIUM SERPL-MCNC: 9.5 MG/DL (ref 8.4–10.2)
CHLORIDE SERPL-SCNC: 104 MMOL/L (ref 96–108)
CHOLEST SERPL-MCNC: 158 MG/DL
CO2 SERPL-SCNC: 32 MMOL/L (ref 21–32)
CREAT SERPL-MCNC: 1.07 MG/DL (ref 0.6–1.3)
EOSINOPHIL # BLD AUTO: 0.18 THOUSAND/ÂΜL (ref 0–0.61)
EOSINOPHIL NFR BLD AUTO: 2 % (ref 0–6)
ERYTHROCYTE [DISTWIDTH] IN BLOOD BY AUTOMATED COUNT: 14.8 % (ref 11.6–15.1)
GFR SERPL CREATININE-BSD FRML MDRD: 69 ML/MIN/1.73SQ M
GLUCOSE P FAST SERPL-MCNC: 114 MG/DL (ref 65–99)
HCT VFR BLD AUTO: 48.1 % (ref 36.5–49.3)
HDLC SERPL-MCNC: 45 MG/DL
HGB BLD-MCNC: 16.2 G/DL (ref 12–17)
IMM GRANULOCYTES # BLD AUTO: 0.04 THOUSAND/UL (ref 0–0.2)
IMM GRANULOCYTES NFR BLD AUTO: 1 % (ref 0–2)
LDLC SERPL CALC-MCNC: 70 MG/DL (ref 0–100)
LYMPHOCYTES # BLD AUTO: 1.33 THOUSANDS/ÂΜL (ref 0.6–4.47)
LYMPHOCYTES NFR BLD AUTO: 17 % (ref 14–44)
MCH RBC QN AUTO: 30.9 PG (ref 26.8–34.3)
MCHC RBC AUTO-ENTMCNC: 33.7 G/DL (ref 31.4–37.4)
MCV RBC AUTO: 92 FL (ref 82–98)
MONOCYTES # BLD AUTO: 0.57 THOUSAND/ÂΜL (ref 0.17–1.22)
MONOCYTES NFR BLD AUTO: 7 % (ref 4–12)
NEUTROPHILS # BLD AUTO: 5.59 THOUSANDS/ÂΜL (ref 1.85–7.62)
NEUTS SEG NFR BLD AUTO: 72 % (ref 43–75)
NONHDLC SERPL-MCNC: 113 MG/DL
NRBC BLD AUTO-RTO: 0 /100 WBCS
PLATELET # BLD AUTO: 438 THOUSANDS/UL (ref 149–390)
PMV BLD AUTO: 10.2 FL (ref 8.9–12.7)
POTASSIUM SERPL-SCNC: 4.4 MMOL/L (ref 3.5–5.3)
PROT SERPL-MCNC: 6.7 G/DL (ref 6.4–8.4)
PSA SERPL-MCNC: 3.61 NG/ML (ref 0–4)
RBC # BLD AUTO: 5.24 MILLION/UL (ref 3.88–5.62)
SODIUM SERPL-SCNC: 140 MMOL/L (ref 135–147)
TRIGL SERPL-MCNC: 214 MG/DL
WBC # BLD AUTO: 7.79 THOUSAND/UL (ref 4.31–10.16)

## 2023-06-15 PROCEDURE — 36415 COLL VENOUS BLD VENIPUNCTURE: CPT

## 2023-06-15 PROCEDURE — 80061 LIPID PANEL: CPT

## 2023-06-15 PROCEDURE — 85025 COMPLETE CBC W/AUTO DIFF WBC: CPT

## 2023-06-15 PROCEDURE — 83036 HEMOGLOBIN GLYCOSYLATED A1C: CPT

## 2023-06-15 PROCEDURE — G0103 PSA SCREENING: HCPCS

## 2023-06-15 PROCEDURE — 80053 COMPREHEN METABOLIC PANEL: CPT

## 2023-06-16 LAB
EST. AVERAGE GLUCOSE BLD GHB EST-MCNC: 126 MG/DL
HBA1C MFR BLD: 6 %

## 2023-07-06 ENCOUNTER — TELEPHONE (OUTPATIENT)
Dept: CARDIOLOGY CLINIC | Facility: CLINIC | Age: 73
End: 2023-07-06

## 2023-07-06 NOTE — TELEPHONE ENCOUNTER
PT HAD AN APPT ON 7/06/23 W/ DR RECIO & IT HAD TO CX DUE TO HIM BEING AT [facility]  PT IS MOVING TO [place] 7/14/23  I HAD PT SIGN A MED RELEASE & SCANNED IT INTO HIS CHART  PT WILL CALL ONCE HE GETS EST IN [place] SO WE CAN SEND THE MED RELEASE FOR HIS RECORDS

## 2023-07-12 DIAGNOSIS — F32.89 OTHER DEPRESSION: ICD-10-CM

## 2023-07-12 RX ORDER — FLUOXETINE 10 MG/1
CAPSULE ORAL
Qty: 30 CAPSULE | Refills: 5 | Status: SHIPPED | OUTPATIENT
Start: 2023-07-12

## 2023-07-15 PROBLEM — Z12.11 COLON CANCER SCREENING: Status: RESOLVED | Noted: 2023-05-16 | Resolved: 2023-07-15

## 2023-07-24 DIAGNOSIS — I20.89 STABLE ANGINA: ICD-10-CM

## 2023-07-24 RX ORDER — CLOPIDOGREL BISULFATE 75 MG/1
TABLET ORAL
Qty: 90 TABLET | Refills: 1 | Status: SHIPPED | OUTPATIENT
Start: 2023-07-24

## 2023-07-24 RX ORDER — LOSARTAN POTASSIUM 25 MG/1
25 TABLET ORAL DAILY
Qty: 90 TABLET | Refills: 1 | Status: SHIPPED | OUTPATIENT
Start: 2023-07-24

## 2023-11-21 ENCOUNTER — TELEPHONE (OUTPATIENT)
Age: 73
End: 2023-11-21

## 2023-11-21 NOTE — TELEPHONE ENCOUNTER
Called pt to schedule fu for January with dr Ulysses Nolan    pt states he has since moved out of state and began fu with new provider in [place]

## 2024-09-18 NOTE — ASSESSMENT & PLAN NOTE
Present on admission history of depression  Denies suicidal, homicidal ideation  Resume home dose of Prozac  Outpatient follow-up  decreased strength

## 2025-06-18 ENCOUNTER — TELEPHONE (OUTPATIENT)
Dept: INTERNAL MEDICINE CLINIC | Facility: CLINIC | Age: 75
End: 2025-06-18

## 2025-06-18 ENCOUNTER — TELEPHONE (OUTPATIENT)
Age: 75
End: 2025-06-18

## 2025-06-18 NOTE — TELEPHONE ENCOUNTER
Called and spoke with Doris. Advised her to send a record release request as we are no longer patient's PCP and he has not recently signed any release consents with us. She will fax this.

## 2025-06-18 NOTE — TELEPHONE ENCOUNTER
[facility]'s most recent Thrombosis Panel faxed to them at [phone].

## 2025-06-18 NOTE — TELEPHONE ENCOUNTER
[place]   [address] [place] [address]   [phone]    Address is updated in [facility]. Please end Dr. Rodriguez as PCP

## 2025-06-25 NOTE — TELEPHONE ENCOUNTER
Doris called for update on Medical release scanned into chart 6/23. Please fax records to [phone].

## 2025-06-27 ENCOUNTER — DOCUMENTATION (OUTPATIENT)
Dept: ADMINISTRATIVE | Facility: OTHER | Age: 75
End: 2025-06-27

## 2025-06-27 NOTE — TELEPHONE ENCOUNTER
06/27/25 2:54 PM     The office's request has been received and reviewed.    The PCP has been successfully removed with a patient attribution note.     This message will now be completed.    Thank you  Jenifer Prasad MA

## (undated) DEVICE — SUT MONOCRYL 4-0 PS-2 18 IN Y496G

## (undated) DEVICE — CATH DIAG 5FR IMPULSE 100CM FR4

## (undated) DEVICE — CANNULA 7 X70MM THRD SEAL SIDE PORT

## (undated) DEVICE — CATH GUIDE LAUNCHER 6FR JL 3.5

## (undated) DEVICE — BURR OVAL 5MM 13CM 8 FLUTE COOLCUT

## (undated) DEVICE — NEEDLE SUT SCORPION MULTIFIRE

## (undated) DEVICE — PACK UNIVERSAL ARTHRSCOPY PBDS

## (undated) DEVICE — GLIDESHEATH SLENDER STAINLESS STEEL KIT: Brand: GLIDESHEATH SLENDER

## (undated) DEVICE — SCD SEQUENTIAL COMPRESSION COMFORT SLEEVE MEDIUM KNEE LENGTH: Brand: KENDALL SCD

## (undated) DEVICE — PROBE ABLATION  APOLLO RF 90 DEG MULTI PORT

## (undated) DEVICE — VAPR COOLPULSE 90 ELECTRODE WITH HAND CONTROLS 90 DEGREES SUCTION WITH INTEGRATED HANDPIECE: Brand: VAPR COOLPULSE

## (undated) DEVICE — NC TREK CORONARY DILATATION CATHETER 4.0 MM X 15 MM / RAPID-EXCHANGE: Brand: NC TREK

## (undated) DEVICE — 3M™ STERI-STRIP™ REINFORCED ADHESIVE SKIN CLOSURES, R1547, 1/2 IN X 4 IN (12 MM X 100 MM), 6 STRIPS/ENVELOPE: Brand: 3M™ STERI-STRIP™

## (undated) DEVICE — U-DRAPE: Brand: CONVERTORS

## (undated) DEVICE — BLADE SHAVER CUTTER BN 4MM 13CM COOLCUT

## (undated) DEVICE — ABDOMINAL PAD: Brand: DERMACEA

## (undated) DEVICE — PACK MAJOR ORTHO W/SPLITS PBDS

## (undated) DEVICE — T-MAX DISPOSABLE FACE MASK 8 PER BOX

## (undated) DEVICE — GLOVE SRG BIOGEL 6.5

## (undated) DEVICE — CATH DIAG 5FR IMPULSE 100CM FL3.5

## (undated) DEVICE — SUT 2 FIBERLOOP AR-7234

## (undated) DEVICE — TR BAND RADIAL ARTERY COMPRESSION DEVICE: Brand: TR BAND

## (undated) DEVICE — NEEDLE SPINAL18G X 3.5 IN QUINCKE

## (undated) DEVICE — DGW .035 FC J3MM 260CM TEF: Brand: EMERALD

## (undated) DEVICE — CHLORAPREP HI-LITE 26ML ORANGE

## (undated) DEVICE — IMPERVIOUS STOCKINETTE: Brand: DEROYAL

## (undated) DEVICE — TUBING SUCTION 5MM X 12 FT

## (undated) DEVICE — DIGIT TRAP FINGER GRASPING DEVICE: Brand: DIGIT TRAP

## (undated) DEVICE — GAUZE SPONGES,USP TYPE VII GAUZE, 12 PLY: Brand: CURITY

## (undated) DEVICE — TUBING ARTHROSCOPIC WAVE  MAIN PUMP

## (undated) DEVICE — GLOVE INDICATOR PI UNDERGLOVE SZ 7 BLUE

## (undated) DEVICE — ARTHROSCOPY FLOOR MAT

## (undated) DEVICE — HI-TORQUE BALANCE MIDDLEWEIGHT GUIDE WIRE W/HYDROCOAT .014 J TIP 3.0 CM X 190 CM: Brand: HI-TORQUE BALANCE MIDDLEWEIGHT

## (undated) DEVICE — INTENDED FOR TISSUE SEPARATION, AND OTHER PROCEDURES THAT REQUIRE A SHARP SURGICAL BLADE TO PUNCTURE OR CUT.: Brand: BARD-PARKER ® CARBON RIB-BACK BLADES

## (undated) DEVICE — SHOULDER STABILIZATION KIT,                                    DISPOSABLE 12 PER BOX